# Patient Record
Sex: FEMALE | Race: WHITE | Employment: OTHER
[De-identification: names, ages, dates, MRNs, and addresses within clinical notes are randomized per-mention and may not be internally consistent; named-entity substitution may affect disease eponyms.]

---

## 2017-02-01 ENCOUNTER — TELEPHONE (OUTPATIENT)
Dept: OBGYN | Facility: CLINIC | Age: 22
End: 2017-02-01

## 2017-02-01 RX ORDER — NORGESTIMATE AND ETHINYL ESTRADIOL 0.25-0.035
1 KIT ORAL DAILY
Qty: 1 PACKET | Refills: 8 | Status: SHIPPED | OUTPATIENT
Start: 2017-02-01 | End: 2017-09-17 | Stop reason: SDUPTHER

## 2018-03-24 ENCOUNTER — OFFICE VISIT (OUTPATIENT)
Dept: FAMILY MEDICINE CLINIC | Age: 23
End: 2018-03-24
Payer: COMMERCIAL

## 2018-03-24 VITALS
BODY MASS INDEX: 26.84 KG/M2 | SYSTOLIC BLOOD PRESSURE: 118 MMHG | DIASTOLIC BLOOD PRESSURE: 60 MMHG | HEIGHT: 67 IN | WEIGHT: 171 LBS

## 2018-03-24 DIAGNOSIS — Z00.00 WELL ADULT EXAM: Primary | ICD-10-CM

## 2018-03-24 DIAGNOSIS — M21.40 PES PLANUS, UNSPECIFIED LATERALITY: ICD-10-CM

## 2018-03-24 PROCEDURE — 99395 PREV VISIT EST AGE 18-39: CPT | Performed by: FAMILY MEDICINE

## 2018-03-24 ASSESSMENT — PATIENT HEALTH QUESTIONNAIRE - PHQ9
2. FEELING DOWN, DEPRESSED OR HOPELESS: 0
SUM OF ALL RESPONSES TO PHQ QUESTIONS 1-9: 0
SUM OF ALL RESPONSES TO PHQ9 QUESTIONS 1 & 2: 0
1. LITTLE INTEREST OR PLEASURE IN DOING THINGS: 0

## 2018-03-24 NOTE — PROGRESS NOTES
tingling/numbness. Psychiatric: Denies sleep disturbance. Denies anxiety. Denies depressed mood. Admits pain in both feet, arches when she walks and life needed in left shoe    History reviewed. No pertinent surgical history. Family History   Problem Relation Age of Onset    Cancer Maternal Grandmother      intestinal    Cancer Mother      NH lymphoma    Asthma Mother     Diabetes Father      oral medication and diet control    High Cholesterol Father     Hypertension Father        Past Medical History:   Diagnosis Date    Allergic rhinitis     Asthma     Concussion     Conjunctivitis     allergic      Social History   Substance Use Topics    Smoking status: Never Smoker    Smokeless tobacco: Never Used    Alcohol use 0.0 oz/week      Comment: rarely      Current Outpatient Prescriptions   Medication Sig Dispense Refill    SPRINTEC 28 0.25-35 MG-MCG per tablet TAKE 1 TABLET BY MOUTH DAILY 28 tablet 12    fexofenadine (ALLEGRA) 180 MG tablet Take by mouth      cromolyn (OPTICROM) 4 % ophthalmic solution PLACE 1 DROP INTO BOTH EYES 4 TIMES DAILY 10 mL 1    Cetirizine HCl (ZYRTEC ALLERGY PO) Take by mouth daily      Respiratory Therapy Supplies (NEBULIZER COMPRESSOR) KIT by Does not apply route. 1 kit 0     No current facility-administered medications for this visit. No Known Allergies    PHYSICAL EXAM:    /60   Ht 5' 7\" (1.702 m)   Wt 171 lb (77.6 kg)   BMI 26.78 kg/m²   Wt Readings from Last 3 Encounters:   03/24/18 171 lb (77.6 kg)   12/27/16 177 lb (80.3 kg)   10/31/16 173 lb 3.2 oz (78.6 kg)     BP Readings from Last 3 Encounters:   03/24/18 118/60   12/27/16 (!) 106/58   10/31/16 120/70       General Appearance: in no acute distress, well developed, well nourished. Eyes: pupils equal, round reactive to light and accommodation. Ears: normal canal and TM's. Nose: nares patent, no lesions. Oral Cavity: mucosa moist.  Throat: clear.   Neck/Thyroid: neck supple, full range of

## 2018-04-26 ENCOUNTER — HOSPITAL ENCOUNTER (OUTPATIENT)
Dept: PHYSICAL THERAPY | Age: 23
Setting detail: THERAPIES SERIES
Discharge: HOME | End: 2018-04-26
Payer: COMMERCIAL

## 2018-04-26 PROCEDURE — L3020 FOOT LONGITUD/METATARSAL SUP: HCPCS

## 2018-04-26 PROCEDURE — 97760 ORTHOTIC MGMT&TRAING 1ST ENC: CPT

## 2018-04-26 PROCEDURE — G8980 MOBILITY D/C STATUS: HCPCS

## 2018-04-26 PROCEDURE — G8978 MOBILITY CURRENT STATUS: HCPCS

## 2018-04-26 PROCEDURE — G8979 MOBILITY GOAL STATUS: HCPCS

## 2018-11-08 ENCOUNTER — HOSPITAL ENCOUNTER (OUTPATIENT)
Age: 23
Setting detail: SPECIMEN
Discharge: HOME | End: 2018-11-08
Payer: COMMERCIAL

## 2018-11-08 ENCOUNTER — OFFICE VISIT (OUTPATIENT)
Dept: OBGYN | Age: 23
End: 2018-11-08
Payer: COMMERCIAL

## 2018-11-08 VITALS
BODY MASS INDEX: 29.73 KG/M2 | SYSTOLIC BLOOD PRESSURE: 122 MMHG | WEIGHT: 196.2 LBS | DIASTOLIC BLOOD PRESSURE: 78 MMHG | HEIGHT: 68 IN

## 2018-11-08 DIAGNOSIS — Z01.419 ENCOUNTER FOR WELL WOMAN EXAM WITH ROUTINE GYNECOLOGICAL EXAM: Primary | ICD-10-CM

## 2018-11-08 DIAGNOSIS — Z01.419 ENCOUNTER FOR WELL WOMAN EXAM WITH ROUTINE GYNECOLOGICAL EXAM: ICD-10-CM

## 2018-11-08 DIAGNOSIS — Z23 NEED FOR IMMUNIZATION AGAINST INFLUENZA: ICD-10-CM

## 2018-11-08 PROCEDURE — 90471 IMMUNIZATION ADMIN: CPT | Performed by: ADVANCED PRACTICE MIDWIFE

## 2018-11-08 PROCEDURE — 90686 IIV4 VACC NO PRSV 0.5 ML IM: CPT | Performed by: ADVANCED PRACTICE MIDWIFE

## 2018-11-08 PROCEDURE — G0145 SCR C/V CYTO,THINLAYER,RESCR: HCPCS

## 2018-11-08 PROCEDURE — 99395 PREV VISIT EST AGE 18-39: CPT | Performed by: ADVANCED PRACTICE MIDWIFE

## 2018-11-28 LAB — CYTOLOGY REPORT: NORMAL

## 2019-07-15 RX ORDER — NORGESTIMATE AND ETHINYL ESTRADIOL 0.25-0.035
KIT ORAL
Qty: 84 TABLET | Refills: 4 | Status: SHIPPED | OUTPATIENT
Start: 2019-07-15 | End: 2019-11-07 | Stop reason: SDUPTHER

## 2019-10-28 ENCOUNTER — OFFICE VISIT (OUTPATIENT)
Dept: PRIMARY CARE CLINIC | Age: 24
End: 2019-10-28
Payer: COMMERCIAL

## 2019-10-28 VITALS
RESPIRATION RATE: 22 BRPM | SYSTOLIC BLOOD PRESSURE: 134 MMHG | TEMPERATURE: 97.8 F | HEART RATE: 95 BPM | BODY MASS INDEX: 32.36 KG/M2 | DIASTOLIC BLOOD PRESSURE: 81 MMHG | WEIGHT: 212.8 LBS

## 2019-10-28 DIAGNOSIS — J01.00 ACUTE MAXILLARY SINUSITIS, RECURRENCE NOT SPECIFIED: Primary | ICD-10-CM

## 2019-10-28 DIAGNOSIS — L30.9 ECZEMA, UNSPECIFIED TYPE: ICD-10-CM

## 2019-10-28 PROCEDURE — 99214 OFFICE O/P EST MOD 30 MIN: CPT | Performed by: NURSE PRACTITIONER

## 2019-10-28 PROCEDURE — 1036F TOBACCO NON-USER: CPT | Performed by: NURSE PRACTITIONER

## 2019-10-28 PROCEDURE — G8417 CALC BMI ABV UP PARAM F/U: HCPCS | Performed by: NURSE PRACTITIONER

## 2019-10-28 PROCEDURE — G8484 FLU IMMUNIZE NO ADMIN: HCPCS | Performed by: NURSE PRACTITIONER

## 2019-10-28 PROCEDURE — G8428 CUR MEDS NOT DOCUMENT: HCPCS | Performed by: NURSE PRACTITIONER

## 2019-10-28 RX ORDER — AMOXICILLIN AND CLAVULANATE POTASSIUM 875; 125 MG/1; MG/1
1 TABLET, FILM COATED ORAL 2 TIMES DAILY
Qty: 20 TABLET | Refills: 0 | Status: SHIPPED | OUTPATIENT
Start: 2019-10-28 | End: 2019-11-07

## 2019-10-28 ASSESSMENT — ENCOUNTER SYMPTOMS
SORE THROAT: 1
COUGH: 0
RESPIRATORY NEGATIVE: 1
SINUS PRESSURE: 1
SINUS PAIN: 1
EYES NEGATIVE: 1
RHINORRHEA: 1

## 2019-11-07 ENCOUNTER — OFFICE VISIT (OUTPATIENT)
Dept: OBGYN | Age: 24
End: 2019-11-07
Payer: COMMERCIAL

## 2019-11-07 VITALS
HEIGHT: 67 IN | SYSTOLIC BLOOD PRESSURE: 118 MMHG | BODY MASS INDEX: 32.49 KG/M2 | WEIGHT: 207 LBS | DIASTOLIC BLOOD PRESSURE: 72 MMHG

## 2019-11-07 DIAGNOSIS — Z01.419 WELL WOMAN EXAM WITH ROUTINE GYNECOLOGICAL EXAM: Primary | ICD-10-CM

## 2019-11-07 PROCEDURE — G8484 FLU IMMUNIZE NO ADMIN: HCPCS | Performed by: ADVANCED PRACTICE MIDWIFE

## 2019-11-07 PROCEDURE — 99395 PREV VISIT EST AGE 18-39: CPT | Performed by: ADVANCED PRACTICE MIDWIFE

## 2019-11-07 RX ORDER — NORGESTIMATE AND ETHINYL ESTRADIOL 0.25-0.035
KIT ORAL
Qty: 84 TABLET | Refills: 4 | Status: SHIPPED | OUTPATIENT
Start: 2019-11-07 | End: 2021-01-04 | Stop reason: SINTOL

## 2019-11-07 ASSESSMENT — PATIENT HEALTH QUESTIONNAIRE - PHQ9
2. FEELING DOWN, DEPRESSED OR HOPELESS: 0
SUM OF ALL RESPONSES TO PHQ QUESTIONS 1-9: 0
SUM OF ALL RESPONSES TO PHQ9 QUESTIONS 1 & 2: 0
1. LITTLE INTEREST OR PLEASURE IN DOING THINGS: 0
SUM OF ALL RESPONSES TO PHQ QUESTIONS 1-9: 0

## 2020-06-09 ENCOUNTER — OFFICE VISIT (OUTPATIENT)
Dept: FAMILY MEDICINE CLINIC | Age: 25
End: 2020-06-09
Payer: COMMERCIAL

## 2020-06-09 VITALS
HEIGHT: 67 IN | WEIGHT: 211 LBS | BODY MASS INDEX: 33.12 KG/M2 | SYSTOLIC BLOOD PRESSURE: 118 MMHG | DIASTOLIC BLOOD PRESSURE: 76 MMHG

## 2020-06-09 PROCEDURE — G8417 CALC BMI ABV UP PARAM F/U: HCPCS | Performed by: FAMILY MEDICINE

## 2020-06-09 PROCEDURE — 1036F TOBACCO NON-USER: CPT | Performed by: FAMILY MEDICINE

## 2020-06-09 PROCEDURE — 99213 OFFICE O/P EST LOW 20 MIN: CPT | Performed by: FAMILY MEDICINE

## 2020-06-09 PROCEDURE — G8428 CUR MEDS NOT DOCUMENT: HCPCS | Performed by: FAMILY MEDICINE

## 2020-06-09 PROCEDURE — 96372 THER/PROPH/DIAG INJ SC/IM: CPT | Performed by: FAMILY MEDICINE

## 2020-06-09 RX ORDER — TRIAMCINOLONE ACETONIDE 40 MG/ML
40 INJECTION, SUSPENSION INTRA-ARTICULAR; INTRAMUSCULAR ONCE
Status: COMPLETED | OUTPATIENT
Start: 2020-06-09 | End: 2020-06-09

## 2020-06-09 RX ADMIN — TRIAMCINOLONE ACETONIDE 40 MG: 40 INJECTION, SUSPENSION INTRA-ARTICULAR; INTRAMUSCULAR at 15:46

## 2020-06-09 NOTE — PATIENT INSTRUCTIONS
PLAN:    I would like her to get a kenalog shot today in the office and then start taking flonase or nasonex to help relieve her symptoms and she can take this daily, every other day or twice a day depending on what gives her the best relief. I would like her to call the office if symptoms do not improve by the end of the week. SURVEY:    You may be receiving a survey from [employer] regarding your visit today. Please complete the survey to enable us to provide the highest quality of care to you and your family. If you cannot score us a very good on any question, please call the office to discuss how we could have made your experience a very good one. Thank you.

## 2020-10-28 ENCOUNTER — NURSE ONLY (OUTPATIENT)
Dept: FAMILY MEDICINE CLINIC | Age: 25
End: 2020-10-28
Payer: COMMERCIAL

## 2020-10-28 PROCEDURE — 90471 IMMUNIZATION ADMIN: CPT | Performed by: FAMILY MEDICINE

## 2020-10-28 PROCEDURE — 90686 IIV4 VACC NO PRSV 0.5 ML IM: CPT | Performed by: NURSE PRACTITIONER

## 2020-10-28 PROCEDURE — 90471 IMMUNIZATION ADMIN: CPT | Performed by: NURSE PRACTITIONER

## 2020-10-28 PROCEDURE — 90686 IIV4 VACC NO PRSV 0.5 ML IM: CPT | Performed by: FAMILY MEDICINE

## 2021-01-04 ENCOUNTER — INITIAL PRENATAL (OUTPATIENT)
Dept: OBGYN | Age: 26
End: 2021-01-04
Payer: COMMERCIAL

## 2021-01-04 ENCOUNTER — HOSPITAL ENCOUNTER (OUTPATIENT)
Age: 26
Setting detail: SPECIMEN
Discharge: HOME | End: 2021-01-04
Payer: COMMERCIAL

## 2021-01-04 ENCOUNTER — HOSPITAL ENCOUNTER (OUTPATIENT)
Age: 26
Discharge: HOME | End: 2021-01-04
Payer: COMMERCIAL

## 2021-01-04 VITALS — WEIGHT: 205 LBS | BODY MASS INDEX: 32.11 KG/M2 | DIASTOLIC BLOOD PRESSURE: 78 MMHG | SYSTOLIC BLOOD PRESSURE: 124 MMHG

## 2021-01-04 DIAGNOSIS — N91.2 AMENORRHEA: ICD-10-CM

## 2021-01-04 DIAGNOSIS — Z34.01 ENCOUNTER FOR SUPERVISION OF NORMAL FIRST PREGNANCY IN FIRST TRIMESTER: ICD-10-CM

## 2021-01-04 DIAGNOSIS — N91.2 AMENORRHEA: Primary | ICD-10-CM

## 2021-01-04 DIAGNOSIS — Z32.01 POSITIVE URINE PREGNANCY TEST: ICD-10-CM

## 2021-01-04 LAB
ABO/RH: NORMAL
ABSOLUTE EOS #: 0.15 K/UL (ref 0–0.44)
ABSOLUTE IMMATURE GRANULOCYTE: 0.03 K/UL (ref 0–0.3)
ABSOLUTE LYMPH #: 1.42 K/UL (ref 1.1–3.7)
ABSOLUTE MONO #: 0.52 K/UL (ref 0.1–1.2)
AMPHETAMINE SCREEN URINE: NEGATIVE
ANTIBODY SCREEN: NEGATIVE
BARBITURATE SCREEN URINE: NEGATIVE
BASOPHILS # BLD: 0 % (ref 0–2)
BASOPHILS ABSOLUTE: 0.03 K/UL (ref 0–0.2)
BENZODIAZEPINE SCREEN, URINE: NEGATIVE
BUPRENORPHINE URINE: NEGATIVE
CANNABINOID SCREEN URINE: NEGATIVE
COCAINE METABOLITE, URINE: NEGATIVE
CONTROL: NORMAL
DIFFERENTIAL TYPE: ABNORMAL
EOSINOPHILS RELATIVE PERCENT: 2 % (ref 1–4)
HCT VFR BLD CALC: 41.7 % (ref 36.3–47.1)
HEMOGLOBIN: 13.4 G/DL (ref 11.9–15.1)
HEPATITIS B SURFACE ANTIGEN: NONREACTIVE
HEPATITIS C ANTIBODY: NONREACTIVE
HIV AG/AB: NONREACTIVE
IMMATURE GRANULOCYTES: 0 %
LYMPHOCYTES # BLD: 17 % (ref 24–43)
MCH RBC QN AUTO: 28.7 PG (ref 25.2–33.5)
MCHC RBC AUTO-ENTMCNC: 32.1 G/DL (ref 28.4–34.8)
MCV RBC AUTO: 89.3 FL (ref 82.6–102.9)
MDMA URINE: NORMAL
METHADONE SCREEN, URINE: NEGATIVE
METHAMPHETAMINE, URINE: NEGATIVE
MONOCYTES # BLD: 6 % (ref 3–12)
NRBC AUTOMATED: 0 PER 100 WBC
OPIATES, URINE: NEGATIVE
OXYCODONE SCREEN URINE: NEGATIVE
PDW BLD-RTO: 12.1 % (ref 11.8–14.4)
PHENCYCLIDINE, URINE: NEGATIVE
PLATELET # BLD: 272 K/UL (ref 138–453)
PLATELET ESTIMATE: ABNORMAL
PMV BLD AUTO: 10.3 FL (ref 8.1–13.5)
PREGNANCY TEST URINE, POC: POSITIVE
PROPOXYPHENE, URINE: NEGATIVE
RBC # BLD: 4.67 M/UL (ref 3.95–5.11)
RBC # BLD: ABNORMAL 10*6/UL
RUBV IGG SER QL: 138 IU/ML
SEG NEUTROPHILS: 75 % (ref 36–65)
SEGMENTED NEUTROPHILS ABSOLUTE COUNT: 6.25 K/UL (ref 1.5–8.1)
T. PALLIDUM, IGG: NONREACTIVE
TEST INFORMATION: NORMAL
TRICYCLIC ANTIDEPRESSANTS, UR: NEGATIVE
WBC # BLD: 8.4 K/UL (ref 3.5–11.3)
WBC # BLD: ABNORMAL 10*3/UL

## 2021-01-04 PROCEDURE — 87389 HIV-1 AG W/HIV-1&-2 AB AG IA: CPT

## 2021-01-04 PROCEDURE — 87086 URINE CULTURE/COLONY COUNT: CPT

## 2021-01-04 PROCEDURE — 86850 RBC ANTIBODY SCREEN: CPT

## 2021-01-04 PROCEDURE — 87340 HEPATITIS B SURFACE AG IA: CPT

## 2021-01-04 PROCEDURE — 87491 CHLMYD TRACH DNA AMP PROBE: CPT

## 2021-01-04 PROCEDURE — 36415 COLL VENOUS BLD VENIPUNCTURE: CPT

## 2021-01-04 PROCEDURE — 86762 RUBELLA ANTIBODY: CPT

## 2021-01-04 PROCEDURE — 86780 TREPONEMA PALLIDUM: CPT

## 2021-01-04 PROCEDURE — 81025 URINE PREGNANCY TEST: CPT | Performed by: ADVANCED PRACTICE MIDWIFE

## 2021-01-04 PROCEDURE — 86901 BLOOD TYPING SEROLOGIC RH(D): CPT

## 2021-01-04 PROCEDURE — 87591 N.GONORRHOEAE DNA AMP PROB: CPT

## 2021-01-04 PROCEDURE — 80306 DRUG TEST PRSMV INSTRMNT: CPT

## 2021-01-04 PROCEDURE — 86900 BLOOD TYPING SEROLOGIC ABO: CPT

## 2021-01-04 PROCEDURE — 85025 COMPLETE CBC W/AUTO DIFF WBC: CPT

## 2021-01-04 PROCEDURE — 86803 HEPATITIS C AB TEST: CPT

## 2021-01-04 NOTE — PATIENT INSTRUCTIONS
Patient Education        Weeks 6 to 10 of Your Pregnancy: Care Instructions  Your Care Instructions     Congratulations on your pregnancy. This is an exciting and important time for you. During the first 6 to 10 weeks of your pregnancy, your body goes through many changes. Your baby grows very fast, even though you cannot feel it yet. You may start to notice that you feel different, both in your body and your emotions. Because each woman's pregnancy is unique, there is no right way to feel. You may feel the healthiest you have ever been, or you may feel tired or sick to your stomach (\"morning sickness\"). These early weeks are a time to make healthy choices and to eat the best foods for you and your baby. This care sheet will give you some ideas. This is also a good time to think about birth defects testing. These are tests done during pregnancy to look for possible problems with the baby. First trimester tests for birth defects can be done between 8 and 17 weeks of pregnancy, depending on the test. Talk with your doctor about what kinds of tests are available. Follow-up care is a key part of your treatment and safety. Be sure to make and go to all appointments, and call your doctor if you are having problems. It's also a good idea to know your test results and keep a list of the medicines you take. How can you care for yourself at home? Eat well  · Eat at least 3 meals and 2 healthy snacks every day. Eat fresh, whole foods, including:  ? 7 or more servings of bread, tortillas, cereal, rice, pasta, or oatmeal.  ? 3 or more servings of vegetables, especially leafy green vegetables. ? 2 or more servings of fruits. ? 3 or more servings of milk, yogurt, or cheese. ? 2 or more servings of meat, turkey, chicken, fish, eggs, or dried beans. · Drink plenty of fluids, especially water. Avoid sodas and other sweetened drinks.   · Choose foods that have important vitamins for your baby, such as calcium, iron, and folate. ? Dairy products, tofu, canned fish with bones, almonds, broccoli, dark leafy greens, corn tortillas, and fortified orange juice are good sources of calcium. ? Beef, poultry, liver, spinach, lentils, dried beans, fortified cereals, and dried fruits are rich in iron. ? Dark leafy greens, broccoli, asparagus, liver, fortified cereals, orange juice, peanuts, and almonds are good sources of folate. · Avoid foods that could harm your baby. ? Do not eat raw or undercooked meat, chicken, or fish (such as sushi or raw oysters). ? Do not eat raw eggs or foods that contain raw eggs, such as Caesar dressing. ? Do not eat soft cheeses and unpasteurized dairy foods, such as Brie, feta, or blue cheese. ? Do not eat fish that contains a lot of mercury, such as shark, swordfish, tilefish, or conor mackerel. Do not eat more than 6 ounces of tuna each week. ? Do not eat raw sprouts, especially alfalfa sprouts. ? Cut down on caffeine, such as coffee, tea, and cola. Protect yourself and your baby  · Do not touch klaus litter or cat feces. They can cause an infection that could harm your baby. · High body temperature can be harmful to your baby. So if you want to use a sauna or hot tub, be sure to talk to your doctor about how to use it safely. [place] with morning sickness  · Sip small amounts of water, juices, or shakes. Try drinking between meals, not with meals. · Eat 5 or 6 small meals a day. Try dry toast or crackers when you first get up, and eat breakfast a little later. · Avoid spicy, greasy, and fatty foods. · When you feel sick, open your windows or go for a short walk to get fresh air. · Try nausea wristbands. These help some women. · Tell your doctor if you think your prenatal vitamins make you sick. Where can you learn more? Go to https://hamida.healthConfluence Solar. org and sign in to your FlightStats account.  Enter G112 in the e-Tag box to learn more about \"Weeks 6 to 10 of Your Pregnancy: Care Instructions. \"     If you do not have an account, please click on the \"Sign Up Now\" link. Current as of: February 11, 2020               Content Version: 12.6  © 2006-2020 [employer]. Care instructions adapted under license by [facility] ([facility] [place]). If you have questions about a medical condition or this instruction, always ask your healthcare professional. [address] any warranty or liability for your use of this information. Patient Education        Managing Morning Sickness: Care Instructions  Overview     Morning sickness can be the toughest part of early pregnancy. Some people feel mildly sick to their stomach, and others are running to the bathroom. The good news? Morning sickness usually gets better in the second trimester. It's likely that your hormones are to blame for morning sickness. But you can do things to feel better, like changing what you eat, avoiding certain foods and smells, and asking your doctor about medicines you can try. Follow-up care is a key part of your treatment and safety. Be sure to make and go to all appointments, and call your doctor if you are having problems. It's also a good idea to know your test results and keep a list of the medicines you take. How can you care for yourself at home? · Keep food in your stomach, but not too much at once. Your nausea may be worse if your stomach is empty. Eat five or six small meals a day instead of three large meals. · For morning nausea, eat a small snack, such as a couple of crackers or dry biscuits, before rising. Allow a few minutes for your stomach to settle before you get out of bed slowly. · Drink plenty of fluids, enough so that your urine is light yellow or clear like water. If you have kidney, heart, or liver disease and have to limit fluids, talk with your doctor before you increase the amount of fluids you drink.  Some women find that peppermint tea helps with nausea. · Eat more protein, such as chicken, fish, lean meat, beans, nuts, and seeds. · Eat carbohydrate foods, such as potatoes, whole-grain cereals, rice, and pasta. · Avoid smells and foods that make you feel nauseated. Spicy or high-fat foods, citrus juice, milk, coffee, and tea with caffeine often make nausea worse. · Do not drink alcohol. · Do not smoke. Try not to be around others who smoke. If you need help quitting, talk to your doctor about stop-smoking programs and medicines. These can increase your chances of quitting for good. · If you are taking iron supplements, ask your doctor if they are necessary. Iron can make nausea worse. · Get lots of rest. Stress and fatigue can make your morning sickness worse. · Ask your doctor about taking prescription medicine, or over-the-counter products such as vitamin B6, doxylamine, or lucila, to relieve your symptoms. Your doctor can tell you the doses that are safe for you. · Take your prenatal vitamins at night on a full stomach. When should you call for help? Call 911 anytime you think you may need emergency care. For example, call if:    · You passed out (lost consciousness). Call your doctor now or seek immediate medical care if:    · You are sick to your stomach or cannot drink fluids.     · You have symptoms of dehydration, such as:  ? Dry eyes and a dry mouth. ? Passing only a little urine. ? Feeling thirstier than usual.     · You are not able to keep down your medicine.     · You have pain in your belly or pelvis. Watch closely for changes in your health, and be sure to contact your doctor if:    · You do not get better as expected. Where can you learn more? Go to https://Cagenixparamjit.Artify It. org and sign in to your Vortal account. Enter N062 in the Nuve box to learn more about \"Managing Morning Sickness: Care Instructions. \"     If you do not have an account, please click on the \"Sign Up Now\" link.   Current as of: February 11, 2020               Content Version: 12.6  © 5357-5631 [employer] Incorporated. Care instructions adapted under license by [facility] ([facility]). If you have questions about a medical condition or this instruction, always ask your healthcare professional. [address] any warranty or liability for your use of this information.

## 2021-01-04 NOTE — PROGRESS NOTES
New OB Visit    Date of service: 2021     Mansi Phillips  Is a 26 y.o.  female presenting for a New OB visit with Nurse. Name of Father of Sarah Guajardo is Bladimir Wolf and is involved. Pt does work at [employer] is not Fertility pt. PT's PCP is: Ursula Moreau MD     : 1995                                             Subjective:       Patient's last menstrual period was 2020 (approximate). OB History    Para Term  AB Living   1 0 0 0 0 0   SAB TAB Ectopic Molar Multiple Live Births   0 0 0   0        # Outcome Date GA Lbr Aidan/2nd Weight Sex Delivery Anes PTL Lv   1 Current                      Social History     Tobacco Use   Smoking Status Never Smoker   Smokeless Tobacco Never Used        Social History     Substance and Sexual Activity   Alcohol Use Not Currently    Alcohol/week: 0.0 standard drinks    Comment: rarely        Allergies: Patient has no known allergies. Current Outpatient Medications:     Prenatal Vit-Fe Fumarate-FA (PRENATAL VITAMIN PO), Take by mouth, Disp: , Rfl:     Loratadine (CLARITIN PO), Take by mouth, Disp: , Rfl:     cromolyn (OPTICROM) 4 % ophthalmic solution, PLACE 1 DROP INTO BOTH EYES 4 TIMES DAILY (Patient not taking: Reported on 2021), Disp: 10 mL, Rfl: 1    Cetirizine HCl (ZYRTEC ALLERGY PO), Take by mouth daily, Disp: , Rfl:     Respiratory Therapy Supplies (NEBULIZER COMPRESSOR) KIT, by Does not apply route. (Patient not taking: Reported on 2021), Disp: 1 kit, Rfl: 0      Vital Signs Weight 205 lb (93 kg), last menstrual period 2020, not currently breastfeeding. No results found for this visit on 21. Pain: none                            Nausea: yes      Vomiting: no        Breast enlargement or tenderness: yes    Frequency of urination:yes      Fatigue: yes         Patient history reviewed. Educational materials given and genetic testing reviewed.       Breastfeeding handouts given and reviewed: Yes     If BMI over 35 was HgA1C drawn: N/A      If history of thyroid problem was TSH drawn: N/A       My chart set up and activated: Yes    If history of preeclampsia did we start baby ASA: N/A    If history of  delivery - did we set up progesterone injections:  N/A    Does pt have a history of DVT? N/A  If yes start Lovenox 40 mg daily    Is pt allergic to PCN? No: KNDA  If yes order U/A to culture and sensitivity if positive. Education:  Patient Instructions       Patient Education        Weeks 6 to 10 of Your Pregnancy: Care Instructions  Your Care Instructions     Congratulations on your pregnancy. This is an exciting and important time for you. During the first 6 to 10 weeks of your pregnancy, your body goes through many changes. Your baby grows very fast, even though you cannot feel it yet. You may start to notice that you feel different, both in your body and your emotions. Because each woman's pregnancy is unique, there is no right way to feel. You may feel the healthiest you have ever been, or you may feel tired or sick to your stomach (\"morning sickness\"). These early weeks are a time to make healthy choices and to eat the best foods for you and your baby. This care sheet will give you some ideas. This is also a good time to think about birth defects testing. These are tests done during pregnancy to look for possible problems with the baby. First trimester tests for birth defects can be done between 8 and 17 weeks of pregnancy, depending on the test. Talk with your doctor about what kinds of tests are available. Follow-up care is a key part of your treatment and safety. Be sure to make and go to all appointments, and call your doctor if you are having problems. It's also a good idea to know your test results and keep a list of the medicines you take. How can you care for yourself at home? Eat well  · Eat at least 3 meals and 2 healthy snacks every day.  Eat fresh, whole foods, including:  ? 7 or more servings of bread, tortillas, cereal, rice, pasta, or oatmeal.  ? 3 or more servings of vegetables, especially leafy green vegetables. ? 2 or more servings of fruits. ? 3 or more servings of milk, yogurt, or cheese. ? 2 or more servings of meat, turkey, chicken, fish, eggs, or dried beans. · Drink plenty of fluids, especially water. Avoid sodas and other sweetened drinks. · Choose foods that have important vitamins for your baby, such as calcium, iron, and folate. ? Dairy products, tofu, canned fish with bones, almonds, broccoli, dark leafy greens, corn tortillas, and fortified orange juice are good sources of calcium. ? Beef, poultry, liver, spinach, lentils, dried beans, fortified cereals, and dried fruits are rich in iron. ? Dark leafy greens, broccoli, asparagus, liver, fortified cereals, orange juice, peanuts, and almonds are good sources of folate. · Avoid foods that could harm your baby. ? Do not eat raw or undercooked meat, chicken, or fish (such as sushi or raw oysters). ? Do not eat raw eggs or foods that contain raw eggs, such as Caesar dressing. ? Do not eat soft cheeses and unpasteurized dairy foods, such as Brie, feta, or blue cheese. ? Do not eat fish that contains a lot of mercury, such as shark, swordfish, tilefish, or conor mackerel. Do not eat more than 6 ounces of tuna each week. ? Do not eat raw sprouts, especially alfalfa sprouts. ? Cut down on caffeine, such as coffee, tea, and cola. Protect yourself and your baby  · Do not touch klaus litter or cat feces. They can cause an infection that could harm your baby. · High body temperature can be harmful to your baby. So if you want to use a sauna or hot tub, be sure to talk to your doctor about how to use it safely. [place] with morning sickness  · Sip small amounts of water, juices, or shakes. Try drinking between meals, not with meals. · Eat 5 or 6 small meals a day.  Try dry toast or crackers when you first get up, and eat breakfast a little later. · Avoid spicy, greasy, and fatty foods. · When you feel sick, open your windows or go for a short walk to get fresh air. · Try nausea wristbands. These help some women. · Tell your doctor if you think your prenatal vitamins make you sick. Where can you learn more? Go to https://chpekaleighewtuan.healthIsonas. org and sign in to your [employer] account. Enter G112 in the Mobiscope box to learn more about \"Weeks 6 to 10 of Your Pregnancy: Care Instructions. \"     If you do not have an account, please click on the \"Sign Up Now\" link. Current as of: February 11, 2020               Content Version: 12.6  © 9102-0644 [employer], Incorporated. Care instructions adapted under license by [facility] ([facility]). If you have questions about a medical condition or this instruction, always ask your healthcare professional. [place] [address] any warranty or liability for your use of this information. Patient Education        Managing Morning Sickness: Care Instructions  Overview     Morning sickness can be the toughest part of early pregnancy. Some people feel mildly sick to their stomach, and others are running to the bathroom. The good news? Morning sickness usually gets better in the second trimester. It's likely that your hormones are to blame for morning sickness. But you can do things to feel better, like changing what you eat, avoiding certain foods and smells, and asking your doctor about medicines you can try. Follow-up care is a key part of your treatment and safety. Be sure to make and go to all appointments, and call your doctor if you are having problems. It's also a good idea to know your test results and keep a list of the medicines you take. How can you care for yourself at home? · Keep food in your stomach, but not too much at once. Your nausea may be worse if your stomach is empty.  Eat five or six small meals a day instead of three large meals. · For morning nausea, eat a small snack, such as a couple of crackers or dry biscuits, before rising. Allow a few minutes for your stomach to settle before you get out of bed slowly. · Drink plenty of fluids, enough so that your urine is light yellow or clear like water. If you have kidney, heart, or liver disease and have to limit fluids, talk with your doctor before you increase the amount of fluids you drink. Some women find that peppermint tea helps with nausea. · Eat more protein, such as chicken, fish, lean meat, beans, nuts, and seeds. · Eat carbohydrate foods, such as potatoes, whole-grain cereals, rice, and pasta. · Avoid smells and foods that make you feel nauseated. Spicy or high-fat foods, citrus juice, milk, coffee, and tea with caffeine often make nausea worse. · Do not drink alcohol. · Do not smoke. Try not to be around others who smoke. If you need help quitting, talk to your doctor about stop-smoking programs and medicines. These can increase your chances of quitting for good. · If you are taking iron supplements, ask your doctor if they are necessary. Iron can make nausea worse. · Get lots of rest. Stress and fatigue can make your morning sickness worse. · Ask your doctor about taking prescription medicine, or over-the-counter products such as vitamin B6, doxylamine, or lucila, to relieve your symptoms. Your doctor can tell you the doses that are safe for you. · Take your prenatal vitamins at night on a full stomach. When should you call for help? Call 911 anytime you think you may need emergency care. For example, call if:    · You passed out (lost consciousness). Call your doctor now or seek immediate medical care if:    · You are sick to your stomach or cannot drink fluids.     · You have symptoms of dehydration, such as:  ? Dry eyes and a dry mouth. ? Passing only a little urine. ? Feeling thirstier than usual.     · You are not able to keep down your medicine.   · You have pain in your belly or pelvis. Watch closely for changes in your health, and be sure to contact your doctor if:    · You do not get better as expected. Where can you learn more? Go to https://chpekaleigheweb.health4-[email or web address] org and sign in to your [employer] account. Enter V827 in the [facility] box to learn more about \"Managing Morning Sickness: Care Instructions. \"     If you do not have an account, please click on the \"Sign Up Now\" link. Current as of: February 11, 2020               Content Version: 12.6  © 7122-3404 web care [email or web address]. Care instructions adapted under license by [facility] ([facility]). If you have questions about a medical condition or this instruction, always ask your healthcare professional. [address] any warranty or liability for your use of this information. Assessment:      Diagnosis Orders   1. Amenorrhea  C.trachomatis N.gonorrhoeae DNA, Urine    Culture, Urine    Hepatitis C Antibody    HIV Screen    POCT urine pregnancy    PRENATAL PROFILE I    Prenatal type and screen    Urine Drug Screen, Comprehensive   2. Encounter for supervision of normal first pregnancy in first trimester  C.trachomatis N.gonorrhoeae DNA, Urine    Culture, Urine    Hepatitis C Antibody    HIV Screen    POCT urine pregnancy    PRENATAL PROFILE I    Prenatal type and screen    Urine Drug Screen, Comprehensive   3.  Positive urine pregnancy test  C.trachomatis N.gonorrhoeae DNA, Urine    Culture, Urine    Hepatitis C Antibody    HIV Screen    POCT urine pregnancy    PRENATAL PROFILE I    Prenatal type and screen    Urine Drug Screen, Comprehensive       Plan: Order Routine Prenatal Lab Yes     Order additional testing if requested         Order Prenatal Vitamins   No: Already taking OTC             Appointment with Maya Betts CNM in 1 week for Dating U/S and total body exam if indicated      Nurse:   Aissatou Stern, [address] [place]

## 2021-01-05 LAB
C. TRACHOMATIS DNA ,URINE: NEGATIVE
CULTURE: NORMAL
Lab: NORMAL
N. GONORRHOEAE DNA, URINE: NEGATIVE
SPECIMEN DESCRIPTION: NORMAL
SPECIMEN DESCRIPTION: NORMAL

## 2021-01-12 ENCOUNTER — ROUTINE PRENATAL (OUTPATIENT)
Dept: OBGYN | Age: 26
End: 2021-01-12
Payer: COMMERCIAL

## 2021-01-12 VITALS — DIASTOLIC BLOOD PRESSURE: 78 MMHG | WEIGHT: 203 LBS | SYSTOLIC BLOOD PRESSURE: 122 MMHG | BODY MASS INDEX: 31.79 KG/M2

## 2021-01-12 DIAGNOSIS — Z82.79 FAMILY HISTORY OF MARFAN SYNDROME: ICD-10-CM

## 2021-01-12 DIAGNOSIS — Z3A.09 9 WEEKS GESTATION OF PREGNANCY: Primary | ICD-10-CM

## 2021-01-12 DIAGNOSIS — Z34.01 ENCOUNTER FOR SUPERVISION OF NORMAL FIRST PREGNANCY IN FIRST TRIMESTER: ICD-10-CM

## 2021-01-12 PROCEDURE — 0502F SUBSEQUENT PRENATAL CARE: CPT | Performed by: ADVANCED PRACTICE MIDWIFE

## 2021-01-12 ASSESSMENT — PATIENT HEALTH QUESTIONNAIRE - PHQ9
1. LITTLE INTEREST OR PLEASURE IN DOING THINGS: 0
SUM OF ALL RESPONSES TO PHQ QUESTIONS 1-9: 0
2. FEELING DOWN, DEPRESSED OR HOPELESS: 0

## 2021-01-12 NOTE — PATIENT INSTRUCTIONS
Patient Education        Weeks 10 to 14 of Your Pregnancy: Care Instructions  Your Care Instructions     By weeks 10 to 14 of your pregnancy, the placenta has formed inside your uterus. It is possible to hear your baby's heartbeat with a special ultrasound device. Your baby's eyes can and do move. The arms and legs can bend. This is a good time to think about testing for birth defects. There are two types of tests: screening and diagnostic. Screening tests show the chance that a baby has a certain birth defect. They can't tell you for sure that your baby has a problem. Diagnostic tests show if a baby has a certain birth defect. It's your choice whether to have these tests. You and your partner can talk to your doctor or midwife about birth defects tests. Follow-up care is a key part of your treatment and safety. Be sure to make and go to all appointments, and call your doctor if you are having problems. It's also a good idea to know your test results and keep a list of the medicines you take. How can you care for yourself at home? Decide about tests  · You can have screening tests and diagnostic tests to check for birth defects. The decision to have a test for birth defects is personal. Think about your age, your chance of passing on a family disease, your need to know about any problems, and what you might do after you have the test results. ? Triple or quadruple (quad) blood tests. These screening tests can be done between 15 and 20 weeks of pregnancy. They check the amounts of three or four substances in your blood. The doctor looks at these test results, along with your age and other factors, to find out the chance that your baby may have certain problems. ? Amniocentesis. This diagnostic test is used to look for chromosomal problems in the baby's cells.  It can be done between 15 and 20 weeks of pregnancy, usually around week 16.  ? Nuchal translucency test. This test uses ultrasound to measure the thickness of the area at the back of the baby's neck. An increase in the thickness can be an early sign of Down syndrome. ? Chorionic villus sampling (CVS). This is a test that looks for certain genetic problems with your baby. The same genes that are in your baby are in the placenta. A small piece of the placenta is taken out and tested. This test is done when you are 10 to 13 weeks pregnant. Ease discomfort  · Slow down and take naps when you feel tired. · If your emotions swing, talk to someone. Crying, anxiety, and concentration problems are common. · If your gums bleed, try a softer toothbrush. If your gums are puffy and bleed a lot, see your dentist.  · If you feel dizzy:  ? Get up slowly after sitting or lying down. ? Drink plenty of fluids. ? Eat small snacks to keep your blood sugar stable. ? Put your head between your legs as though you were tying your shoelaces. ? Lie down with your legs higher than your head. Use pillows to prop up your feet. · If you have a headache:  ? Lie down. ? Ask your partner or a good friend for a neck massage. ? Try cool cloths over your forehead or across the back of your neck. ? Use acetaminophen (Tylenol) for pain relief. Do not use nonsteroidal anti-inflammatory drugs (NSAIDs), such as ibuprofen (Advil, Motrin) or naproxen (Aleve), unless your doctor says it is okay. · If you have a nosebleed, pinch your nose gently, and hold it for a short while. To prevent nosebleeds, try massaging a small dab of petroleum jelly, such as Vaseline, in your nostrils. · If your nose is stuffed up, try saline (saltwater) nose sprays. Do not use decongestant sprays. Care for your breasts  · Wear a bra that gives you good support. · Know that changes in your breasts are normal.  ? Your breasts may get larger and more tender. Tenderness usually gets better by 12 weeks. ? Your nipples may get darker and larger, and small bumps around your nipples may show more. ?  The veins in your chest and breasts may show more. · Don't worry about \"toughening'\" your nipples. Breastfeeding will naturally do this. Where can you learn more? Go to https://AisleBuyerpeLSN Mobileeb.World Blender. org and sign in to your Open-X[employer] account. Enter C279 in the PagPop box to learn more about \"Weeks 10 to 14 of Your Pregnancy: Care Instructions. \"     If you do not have an account, please click on the \"Sign Up Now\" link. Current as of: February 11, 2020               Content Version: 12.6  © 6349-4169 [employer], Incorporated. Care instructions adapted under license by [facility] If you have questions about a medical condition or this instruction, always ask your healthcare professional. [address] any warranty or liability for your use of this information.

## 2021-01-12 NOTE — PROGRESS NOTES
Robson Novak is here at 9w1d for:    Chief Complaint   Patient presents with    Routine Prenatal Visit     F/U in house dating u/s. pt c/o nausea but does not want medicine        Estimated Due Date: Estimated Date of Delivery: 21    OB History    Para Term  AB Living   1 0 0 0 0 0   SAB TAB Ectopic Molar Multiple Live Births   0 0 0   0        # Outcome Date GA Lbr Aidan/2nd Weight Sex Delivery Anes PTL Lv   1 Current                 Past Medical History:   Diagnosis Date    Allergic rhinitis     Asthma     Concussion     Conjunctivitis     allergic       History reviewed. No pertinent surgical history. Social History     Tobacco Use   Smoking Status Never Smoker   Smokeless Tobacco Never Used        Social History     Substance and Sexual Activity   Alcohol Use Not Currently    Alcohol/week: 0.0 standard drinks    Comment: rarely       No results found for this visit on 21. Vitals:  /78   Wt 203 lb (92.1 kg)   LMP 2020 (Approximate)   BMI 31.79 kg/m²   Estimated body mass index is 31.79 kg/m² as calculated from the following:    Height as of 20: 5' 7\" (1.702 m). Weight as of this encounter: 203 lb (92.1 kg). HPI: Patient doing well today. Patient denies needs or concerns states she is nauseous but is controlling it a little bit better. Patient does also have heartburn. We did review father of the baby having Marfan syndrome and needing to be transferred at birth. We also discussed MFM consultation    Yes PT denies fever, chills, nausea and vomiting       Abdomen: enlarged, soft     Cervix:  3.6 cm    Results reviewed today:    9.3 WK IUP  CL:3.6cm  HR:179bpm  RT. OVARY:seen, probable corpus luteum cyst 1.9cm x 1.7cm x 1.4cm  LT. OVARY:not visualized      See prenatal vital sign section and fetal assessment section    ASSESSMENT & Plan    Diagnosis Orders   1. 9 weeks gestation of pregnancy  Ambulatory referral to Maternal Fetal   2.  Encounter for supervision of normal first pregnancy in first trimester     3. Family history of Marfan syndrome  Ambulatory referral to Maternal Fetal             I am having Anne Stuart maintain her Nebulizer Compressor, Cetirizine HCl (ZYRTEC ALLERGY PO), cromolyn, Loratadine (CLARITIN PO), and Prenatal Vit-Fe Fumarate-FA (PRENATAL VITAMIN PO). Return in about 4 weeks (around 2/9/2021) for ob  & MFM consult for marfans in father. Patient Instructions     Patient Education        Weeks 10 to 15 of Your Pregnancy: Care Instructions  Your Care Instructions     By weeks 10 to 14 of your pregnancy, the placenta has formed inside your uterus. It is possible to hear your baby's heartbeat with a special ultrasound device. Your baby's eyes can and do move. The arms and legs can bend. This is a good time to think about testing for birth defects. There are two types of tests: screening and diagnostic. Screening tests show the chance that a baby has a certain birth defect. They can't tell you for sure that your baby has a problem. Diagnostic tests show if a baby has a certain birth defect. It's your choice whether to have these tests. You and your partner can talk to your doctor or midwife about birth defects tests. Follow-up care is a key part of your treatment and safety. Be sure to make and go to all appointments, and call your doctor if you are having problems. It's also a good idea to know your test results and keep a list of the medicines you take. How can you care for yourself at home? Decide about tests  · You can have screening tests and diagnostic tests to check for birth defects. The decision to have a test for birth defects is personal. Think about your age, your chance of passing on a family disease, your need to know about any problems, and what you might do after you have the test results. ? Triple or quadruple (quad) blood tests. These screening tests can be done between 15 and 20 weeks of pregnancy. They check the amounts of three or four substances in your blood. The doctor looks at these test results, along with your age and other factors, to find out the chance that your baby may have certain problems. ? Amniocentesis. This diagnostic test is used to look for chromosomal problems in the baby's cells. It can be done between 15 and 20 weeks of pregnancy, usually around week 16.  ? Nuchal translucency test. This test uses ultrasound to measure the thickness of the area at the back of the baby's neck. An increase in the thickness can be an early sign of Down syndrome. ? Chorionic villus sampling (CVS). This is a test that looks for certain genetic problems with your baby. The same genes that are in your baby are in the placenta. A small piece of the placenta is taken out and tested. This test is done when you are 10 to 13 weeks pregnant. Ease discomfort  · Slow down and take naps when you feel tired. · If your emotions swing, talk to someone. Crying, anxiety, and concentration problems are common. · If your gums bleed, try a softer toothbrush. If your gums are puffy and bleed a lot, see your dentist.  · If you feel dizzy:  ? Get up slowly after sitting or lying down. ? Drink plenty of fluids. ? Eat small snacks to keep your blood sugar stable. ? Put your head between your legs as though you were tying your shoelaces. ? Lie down with your legs higher than your head. Use pillows to prop up your feet. · If you have a headache:  ? Lie down. ? Ask your partner or a good friend for a neck massage. ? Try cool cloths over your forehead or across the back of your neck. ? Use acetaminophen (Tylenol) for pain relief. Do not use nonsteroidal anti-inflammatory drugs (NSAIDs), such as ibuprofen (Advil, Motrin) or naproxen (Aleve), unless your doctor says it is okay. · If you have a nosebleed, pinch your nose gently, and hold it for a short while.  To prevent nosebleeds, try massaging a small dab of petroleum jelly, such as Vaseline, in your nostrils. · If your nose is stuffed up, try saline (saltwater) nose sprays. Do not use decongestant sprays. Care for your breasts  · Wear a bra that gives you good support. · Know that changes in your breasts are normal.  ? Your breasts may get larger and more tender. Tenderness usually gets better by 12 weeks. ? Your nipples may get darker and larger, and small bumps around your nipples may show more. ? The veins in your chest and breasts may show more. · Don't worry about \"toughening'\" your nipples. Breastfeeding will naturally do this. Where can you learn more? Go to https://sellpoints.Linear Computer Solutions. org and sign in to your Spotster account. Enter N153 in the AG&P box to learn more about \"Weeks 10 to 14 of Your Pregnancy: Care Instructions. \"     If you do not have an account, please click on the \"Sign Up Now\" link. Current as of: February 11, 2020               Content Version: 12.6  © 7947-2316 [employer]X, Incorporated. Care instructions adapted under license by [facility] ([facility]). If you have questions about a medical condition or this instruction, always ask your healthcare professional. David Bradford any warranty or liability for your use of this information.                    Maribel Traore,1/12/2021 12:18 PM

## 2021-02-08 ENCOUNTER — ROUTINE PRENATAL (OUTPATIENT)
Dept: PERINATAL CARE | Age: 26
End: 2021-02-08
Payer: COMMERCIAL

## 2021-02-08 VITALS
RESPIRATION RATE: 16 BRPM | SYSTOLIC BLOOD PRESSURE: 110 MMHG | DIASTOLIC BLOOD PRESSURE: 68 MMHG | HEART RATE: 94 BPM | WEIGHT: 208 LBS | TEMPERATURE: 97.2 F | HEIGHT: 68 IN | BODY MASS INDEX: 31.52 KG/M2

## 2021-02-08 DIAGNOSIS — Z36.9 FIRST TRIMESTER SCREENING: ICD-10-CM

## 2021-02-08 DIAGNOSIS — O35.2XX0 HEREDITARY FAMILIAL DISEASE AFFECTING MANAGEMENT OF MOTHER AND POSSIBLY AFFECTING FETUS, ANTEPARTUM, SINGLE OR UNSPECIFIED FETUS: Primary | ICD-10-CM

## 2021-02-08 DIAGNOSIS — O36.80X0 ENCOUNTER TO DETERMINE FETAL VIABILITY OF PREGNANCY, SINGLE OR UNSPECIFIED FETUS: ICD-10-CM

## 2021-02-08 DIAGNOSIS — Z3A.13 13 WEEKS GESTATION OF PREGNANCY: ICD-10-CM

## 2021-02-08 LAB
CRL: NORMAL
SAC DIAMETER: NORMAL

## 2021-02-08 PROCEDURE — G8482 FLU IMMUNIZE ORDER/ADMIN: HCPCS | Performed by: OBSTETRICS & GYNECOLOGY

## 2021-02-08 PROCEDURE — G8427 DOCREV CUR MEDS BY ELIG CLIN: HCPCS | Performed by: OBSTETRICS & GYNECOLOGY

## 2021-02-08 PROCEDURE — G8417 CALC BMI ABV UP PARAM F/U: HCPCS | Performed by: OBSTETRICS & GYNECOLOGY

## 2021-02-08 PROCEDURE — 76813 OB US NUCHAL MEAS 1 GEST: CPT | Performed by: OBSTETRICS & GYNECOLOGY

## 2021-02-08 PROCEDURE — 99243 OFF/OP CNSLTJ NEW/EST LOW 30: CPT | Performed by: OBSTETRICS & GYNECOLOGY

## 2021-02-08 PROCEDURE — 76801 OB US < 14 WKS SINGLE FETUS: CPT | Performed by: OBSTETRICS & GYNECOLOGY

## 2021-02-09 ENCOUNTER — ROUTINE PRENATAL (OUTPATIENT)
Dept: OBGYN | Age: 26
End: 2021-02-09
Payer: COMMERCIAL

## 2021-02-09 ENCOUNTER — HOSPITAL ENCOUNTER (OUTPATIENT)
Age: 26
Setting detail: SPECIMEN
Discharge: HOME | End: 2021-02-09
Payer: COMMERCIAL

## 2021-02-09 VITALS — DIASTOLIC BLOOD PRESSURE: 70 MMHG | BODY MASS INDEX: 31.32 KG/M2 | WEIGHT: 206 LBS | SYSTOLIC BLOOD PRESSURE: 118 MMHG

## 2021-02-09 DIAGNOSIS — R12 HEARTBURN DURING PREGNANCY IN SECOND TRIMESTER: ICD-10-CM

## 2021-02-09 DIAGNOSIS — O26.892 HEARTBURN DURING PREGNANCY IN SECOND TRIMESTER: ICD-10-CM

## 2021-02-09 DIAGNOSIS — Z3A.13 13 WEEKS GESTATION OF PREGNANCY: ICD-10-CM

## 2021-02-09 DIAGNOSIS — Z12.4 SCREENING FOR CERVICAL CANCER: ICD-10-CM

## 2021-02-09 DIAGNOSIS — Z82.79 FAMILY HISTORY OF MARFAN SYNDROME: ICD-10-CM

## 2021-02-09 DIAGNOSIS — Z3A.13 13 WEEKS GESTATION OF PREGNANCY: Primary | ICD-10-CM

## 2021-02-09 PROCEDURE — 0502F SUBSEQUENT PRENATAL CARE: CPT | Performed by: ADVANCED PRACTICE MIDWIFE

## 2021-02-09 PROCEDURE — G0145 SCR C/V CYTO,THINLAYER,RESCR: HCPCS

## 2021-02-09 RX ORDER — OMEPRAZOLE 20 MG/1
20 CAPSULE, DELAYED RELEASE ORAL DAILY
Qty: 90 CAPSULE | Refills: 3 | Status: SHIPPED | OUTPATIENT
Start: 2021-02-09 | End: 2021-08-31

## 2021-02-09 RX ORDER — ASPIRIN 81 MG/1
81 TABLET ORAL DAILY
Qty: 90 TABLET | Refills: 2 | Status: ON HOLD | OUTPATIENT
Start: 2021-02-09 | End: 2021-08-11 | Stop reason: HOSPADM

## 2021-02-09 NOTE — PROGRESS NOTES
Anabell Sethi is here at 13w1d for:    Chief Complaint   Patient presents with    Routine Prenatal Visit     TBE-PAP. last pap 18. Patient complains of stomache upset with eating. She has been trying a bland diet with some improvement. Estimated Due Date: Estimated Date of Delivery: 21    OB History    Para Term  AB Living   1 0 0 0 0 0   SAB TAB Ectopic Molar Multiple Live Births   0 0 0   0        # Outcome Date GA Lbr Aidan/2nd Weight Sex Delivery Anes PTL Lv   1 Current                 Past Medical History:   Diagnosis Date    Allergic rhinitis     Asthma     Concussion     Conjunctivitis     allergic       History reviewed. No pertinent surgical history. Social History     Tobacco Use   Smoking Status Never Smoker   Smokeless Tobacco Never Used        Social History     Substance and Sexual Activity   Alcohol Use Not Currently    Alcohol/week: 0.0 standard drinks    Comment: rarely       No results found for this visit on 21. Vitals:  /70   Wt 206 lb (93.4 kg)   LMP 2020 (Approximate)   BMI 31.32 kg/m²   Estimated body mass index is 31.32 kg/m² as calculated from the following:    Height as of 21: 5' 8\" (1.727 m). Weight as of this encounter: 206 lb (93.4 kg). HPI: Patient presents for a return OB visit and reports some concerns for some GI issues. She reports her stomach feels very acidic and she is burping more than she ever did. She states she will occasionally also have nausea. She has tried Tums, but this is only helpful for a few minutes. She reports she saw [facility] and the appointment went well. She reports they are having her do an early 1 hour since her father has diabetes - she is going to do this soon. They also started her on baby Aspirin and she is going to start this today.  Patient reports her last pap was a few years ago and was normal.     Yes PT denies fever, chills, nausea and vomiting Abdomen: enlarged, soft, nontender     Total body exam completed please see prenatal physical exam tab in visit navigator       Results reviewed today:    No results found for this visit on 02/09/21. See prenatal vital sign section and fetal assessment section    ASSESSMENT & Plan    Diagnosis Orders   1. 13 weeks gestation of pregnancy  PAP SMEAR   2. Screening for cervical cancer  PAP SMEAR   3. Family history of Marfan syndrome  aspirin EC 81 MG EC tablet   4. Heartburn during pregnancy in second trimester  omeprazole (PRILOSEC) 20 MG delayed release capsule     1. Reviewed [facility] results which showed early genetic screening was normal. Reviewed second trimester screening at 16-20 weeks for neural tube defects. Reviewed recommendation for 20 week ultrasound and 26 week echo at [facility] and patient verbalized understanding. 2. Discussed need for pap smear according to ASCCP guidelines - pap smear collected. 3. Pepcid and Aspirin sent to patient's pharmacy. I am having Pan Stuart start on omeprazole and aspirin EC. I am also having her maintain her Nebulizer Compressor, Cetirizine HCl (ZYRTEC ALLERGY PO), cromolyn, Loratadine (CLARITIN PO), and Prenatal Vit-Fe Fumarate-FA (PRENATAL VITAMIN PO). Return in about 4 weeks (around 3/9/2021) for OB. Patient Instructions       Patient Education        Weeks 14 to 25 of Your Pregnancy: Care Instructions  Your Care Instructions     During this time, you may start to \"show,\" so that you look pregnant to people around you. You may also notice some changes in your skin, such as itchy spots on your palms or acne on your face. Your baby is now able to pass urine, and your baby's first stool (meconium) is starting to collect in his or her intestines. Hair is also beginning to grow on your baby's head. At your next visit, between weeks 18 and 20, your doctor may do an ultrasound test. The test allows your doctor to check for certain problems. Your doctor can also tell the sex of your baby. This is a good time to think about whether you want to know whether your baby is a boy or a girl. Talk to your doctor about getting a flu shot to help keep you healthy during your pregnancy. As your pregnancy moves along, it is common to worry or feel anxious. Your body is changing a lot. And you are thinking about giving birth, the health of your baby, and becoming a parent. You can learn to cope with any anxiety and stress you feel. Follow-up care is a key part of your treatment and safety. Be sure to make and go to all appointments, and call your doctor if you are having problems. It's also a good idea to know your test results and keep a list of the medicines you take. How can you care for yourself at home? Reduce stress    · Ask for help with cooking and housekeeping.     · Figure out who or what causes your stress. Avoid these people or situations as much as possible.     · Relax every day. Taking 10- to 15-minute breaks can make a big difference. Take a walk, listen to music, or take a warm bath.     · Learn relaxation techniques at prenatal or yoga class. Or buy a relaxation tape.     · List your fears about having a baby and becoming a parent. Share the list with someone you trust. Decide which worries are really small, and try to let them go. Exercise    · If you did not exercise much before pregnancy, start slowly. Walking is best. [employer], and do a little more every day.     · Brisk walking, easy jogging, low-impact aerobics, water aerobics, and yoga are good choices. Some sports, such as scuba diving, horseback riding, downhill skiing, gymnastics, and water skiing, are not a good idea.   · Try to do at least 2½ hours a week of moderate exercise, such as a fast walk. One way to do this is to be active 30 minutes a day, at least 5 days a week. It's fine to be active in blocks of 10 minutes or more throughout your day and week.     · Wear loose clothing. And wear shoes and a bra that provide good support.     · Warm up and cool down to start and finish your exercise.     · If you want to use weights, be sure to use light weights. They reduce stress on your joints. Stay at the best weight for you    · Experts recommend that you gain about 1 pound a month during the first 3 months of your pregnancy.     · Experts recommend that you gain about 1 pound a week during your last 6 months of pregnancy, for a total weight gain of 25 to 35 pounds.     · If you are underweight, you will need to gain more weight (about 28 to 40 pounds).     · If you are overweight, you may not need to gain as much weight (about 15 to 25 pounds).     · If you are gaining weight too fast, use common sense. Exercise every day, and limit sweets, fast foods, and fats. Choose lean meats, fruits, and vegetables.     · If you are having twins or more, your doctor may refer you to a dietitian. Where can you learn more? Go to https://VelaTel Global CommunicationspeLumiThera.healthIntroNet. org and sign in to your [employer] account. Enter E292 in the [facility] box to learn more about \"Weeks 14 to 18 of Your Pregnancy: Care Instructions. \"     If you do not have an account, please click on the \"Sign Up Now\" link. Current as of: February 11, 2020               Content Version: 12.6  © 5124-3481 [employer], Incorporated. Care instructions adapted under license by [facility] ([facility]). If you have questions about a medical condition or this instruction, always ask your healthcare professional. [address]  any warranty or liability for your use of this information.                    Tomasa Traore,2/9/2021 11:55 AM

## 2021-02-09 NOTE — PATIENT INSTRUCTIONS

## 2021-02-20 LAB — CYTOLOGY REPORT: NORMAL

## 2021-03-09 ENCOUNTER — ROUTINE PRENATAL (OUTPATIENT)
Dept: OBGYN | Age: 26
End: 2021-03-09
Payer: COMMERCIAL

## 2021-03-09 VITALS — DIASTOLIC BLOOD PRESSURE: 68 MMHG | WEIGHT: 207.4 LBS | SYSTOLIC BLOOD PRESSURE: 112 MMHG | BODY MASS INDEX: 31.54 KG/M2

## 2021-03-09 DIAGNOSIS — Z3A.17 17 WEEKS GESTATION OF PREGNANCY: Primary | ICD-10-CM

## 2021-03-09 DIAGNOSIS — Z82.79 FAMILY HISTORY OF MARFAN SYNDROME: ICD-10-CM

## 2021-03-09 PROCEDURE — 0502F SUBSEQUENT PRENATAL CARE: CPT | Performed by: ADVANCED PRACTICE MIDWIFE

## 2021-03-09 NOTE — PROGRESS NOTES
Paula Worley is here at 17w2d for:    Chief Complaint   Patient presents with    Routine Prenatal Visit     Pt here for routine prenatal visit. Pt states she would like to travel by plane and would like to know if she should follow any special protocol. Estimated Due Date: Estimated Date of Delivery: 21    OB History    Para Term  AB Living   1 0 0 0 0 0   SAB TAB Ectopic Molar Multiple Live Births   0 0 0   0        # Outcome Date GA Lbr Aidan/2nd Weight Sex Delivery Anes PTL Lv   1 Current                 Past Medical History:   Diagnosis Date    Allergic rhinitis     Asthma     Concussion     Conjunctivitis     allergic       History reviewed. No pertinent surgical history. Social History     Tobacco Use   Smoking Status Never Smoker   Smokeless Tobacco Never Used        Social History     Substance and Sexual Activity   Alcohol Use Not Currently    Alcohol/week: 0.0 standard drinks    Comment: rarely       No results found for this visit on 21. Vitals:  /68   Wt 207 lb 6.4 oz (94.1 kg)   LMP 2020 (Approximate)   BMI 31.54 kg/m²   Estimated body mass index is 31.54 kg/m² as calculated from the following:    Height as of 21: 5' 8\" (1.727 m). Weight as of this encounter: 207 lb 6.4 oz (94.1 kg). HPI: Patient here today for OB visit. She is 17 weeks and 1..  Patient states she does have her MFM appointment scheduled they will see her at 20 weeks and again at 32 the fetal echo    Yes PT denies fever, chills, nausea and vomiting       Abdomen: enlarged, u/2     Cervix:  not digitally examined    Results reviewed today:    No results found for this visit on 21. See prenatal vital sign section and fetal assessment section    ASSESSMENT & Plan    Diagnosis Orders   1. 17 weeks gestation of pregnancy     2. Family history of Marfan syndrome               I am having [employer].  Narciso maintain her Nebulizer Compressor, Cetirizine HCl (ZYRTEC ALLERGY PO), cromolyn, Loratadine (CLARITIN PO), Prenatal Vit-Fe Fumarate-FA (PRENATAL VITAMIN PO), omeprazole, and aspirin EC. Return in about 27 days (around 4/5/2021) for OB reveiw MFM appt. Patient Instructions     Patient Education        Weeks 18 to 25 of Your Pregnancy: Care Instructions  Your Care Instructions     Your baby is continuing to develop quickly. At this stage, babies can now suck their thumbs,  firmly with their hands, and open and close their eyelids. Sometime between 18 and 22 weeks, you will start to feel your baby move. At first, these small fetal movements feel like fluttering or \"butterflies. \" Some women say that they feel like gas bubbles. As the baby grows, these movements will become stronger. You may also notice that your baby kicks and hiccups. During this time, you may find that your nausea and fatigue are gone. Overall, you may feel better and have more energy than you did in your first trimester. But you may also have new discomforts now, such as sleep problems or leg cramps. This care sheet can help you ease these discomforts. Follow-up care is a key part of your treatment and safety. Be sure to make and go to all appointments, and call your doctor if you are having problems. It's also a good idea to know your test results and keep a list of the medicines you take. How can you care for yourself at home? Ease sleep problems  · Avoid caffeine in drinks or chocolate late in the day. · Get some exercise every day. · Take a warm shower or bath before bed. · Have a light snack or glass of milk at bedtime. · Do relaxation exercises in bed to calm your mind and body. · Support your legs and back with extra pillows. Try a pillow between your legs if you sleep on your side. · Do not use sleeping pills or alcohol. They could harm your baby. Ease leg cramps  · Do not massage your calf during the cramp. · Sit on a firm bed or chair.  Straighten your leg, and bend your foot (flex your ankle) slowly upward, toward your knee. Bend your toes up and down. · Stand on a cool, flat surface. Stretch your toes upward, and take small steps walking on your heels. · Use a heating pad or hot water bottle to help with muscle ache. Prevent leg cramps  · Be sure to get enough calcium. If you are worried that you are not getting enough, talk to your doctor. · Exercise every day, and stretch your legs before bed. · Take a warm bath before bed, and try leg warmers at night. Where can you learn more? Go to https://Affinepepiceweb.[email or web address] org and sign in to your [employer] account. Enter M809 in the [employer] box to learn more about \"Weeks 18 to 22 of Your Pregnancy: Care Instructions. \"     If you do not have an account, please click on the \"Sign Up Now\" link. Current as of: February 11, 2020               Content Version: 12.6  © 8776-5403 [employer]. Care instructions adapted under license by [facility] ([facility]). If you have questions about a medical condition or this instruction, always ask your healthcare professional. [place] [address] any warranty or liability for your use of this information.                    Barber Traore,3/9/2021 10:33 AM

## 2021-03-24 ENCOUNTER — HOSPITAL ENCOUNTER (OUTPATIENT)
Age: 26
Discharge: HOME | End: 2021-03-24
Payer: COMMERCIAL

## 2021-03-24 LAB
GLUCOSE ADMINISTRATION: 50
GLUCOSE TOLERANCE SCREEN 50G: 76 MG/DL (ref 70–135)

## 2021-03-24 PROCEDURE — 36415 COLL VENOUS BLD VENIPUNCTURE: CPT

## 2021-03-24 PROCEDURE — 82950 GLUCOSE TEST: CPT

## 2021-03-29 ENCOUNTER — ROUTINE PRENATAL (OUTPATIENT)
Dept: PERINATAL CARE | Age: 26
End: 2021-03-29
Payer: COMMERCIAL

## 2021-03-29 VITALS
TEMPERATURE: 97.1 F | HEART RATE: 80 BPM | WEIGHT: 214 LBS | BODY MASS INDEX: 32.43 KG/M2 | RESPIRATION RATE: 16 BRPM | HEIGHT: 68 IN | SYSTOLIC BLOOD PRESSURE: 116 MMHG | DIASTOLIC BLOOD PRESSURE: 70 MMHG

## 2021-03-29 DIAGNOSIS — O35.2XX0 HEREDITARY FAMILIAL DISEASE AFFECTING MANAGEMENT OF MOTHER AND POSSIBLY AFFECTING FETUS, ANTEPARTUM, SINGLE OR UNSPECIFIED FETUS: Primary | ICD-10-CM

## 2021-03-29 DIAGNOSIS — O99.512 ASTHMA AFFECTING PREGNANCY IN SECOND TRIMESTER: ICD-10-CM

## 2021-03-29 DIAGNOSIS — Z36.86 SCREENING, ANTENATAL, FOR RISK OF PRE-TERM LABOR: ICD-10-CM

## 2021-03-29 DIAGNOSIS — J45.909 ASTHMA AFFECTING PREGNANCY IN SECOND TRIMESTER: ICD-10-CM

## 2021-03-29 DIAGNOSIS — Z3A.20 20 WEEKS GESTATION OF PREGNANCY: ICD-10-CM

## 2021-03-29 PROCEDURE — 76817 TRANSVAGINAL US OBSTETRIC: CPT | Performed by: OBSTETRICS & GYNECOLOGY

## 2021-03-29 PROCEDURE — 76811 OB US DETAILED SNGL FETUS: CPT | Performed by: OBSTETRICS & GYNECOLOGY

## 2021-03-29 NOTE — PROGRESS NOTES
Patient declined invasive prenatal diagnostic testing again today to evaluate for fetal aneuploidy, fetal Marfan status, fetal single gene disorders, fetal microarray abnormalities, etc.     Sent for MSAFP (maternal serum alpha-feto protein level) only lab draw today. I would advise continuation of daily oral baby aspirin 81 mg based on guidelines by the USPSTF/ACOG (for preeclampsia prevention for pregnant women at \"high-risk\"  for preeclampsia). Advise repeat 1-hour glucola between 24-28 weeks' gestation to evaluate for gestational diabetes.

## 2021-04-02 ENCOUNTER — HOSPITAL ENCOUNTER (OUTPATIENT)
Age: 26
Discharge: HOME | End: 2021-04-02
Payer: COMMERCIAL

## 2021-04-02 PROCEDURE — 36415 COLL VENOUS BLD VENIPUNCTURE: CPT

## 2021-04-02 PROCEDURE — 82105 ALPHA-FETOPROTEIN SERUM: CPT

## 2021-04-05 ENCOUNTER — ROUTINE PRENATAL (OUTPATIENT)
Dept: OBGYN | Age: 26
End: 2021-04-05
Payer: COMMERCIAL

## 2021-04-05 VITALS — SYSTOLIC BLOOD PRESSURE: 112 MMHG | BODY MASS INDEX: 32.87 KG/M2 | DIASTOLIC BLOOD PRESSURE: 74 MMHG | WEIGHT: 216.2 LBS

## 2021-04-05 DIAGNOSIS — Z3A.21 21 WEEKS GESTATION OF PREGNANCY: Primary | ICD-10-CM

## 2021-04-05 DIAGNOSIS — Z82.79 FAMILY HISTORY OF MARFAN SYNDROME: ICD-10-CM

## 2021-04-05 PROCEDURE — 0502F SUBSEQUENT PRENATAL CARE: CPT | Performed by: ADVANCED PRACTICE MIDWIFE

## 2021-04-05 NOTE — PROGRESS NOTES
Tierra Zurita is here at 21w0d for:    Chief Complaint   Patient presents with    Routine Prenatal Visit     PT here for routine prenatal ans follow up mfm. Pt states that at her last appointment she was not seen by the doctor and has been needing to do repeat labs that dont make sense and was unsure if this is all necessary. Estimated Due Date: Estimated Date of Delivery: 21    OB History    Para Term  AB Living   1 0 0 0 0 0   SAB TAB Ectopic Molar Multiple Live Births   0 0 0   0        # Outcome Date GA Lbr Aidan/2nd Weight Sex Delivery Anes PTL Lv   1 Current                 Past Medical History:   Diagnosis Date    Allergic rhinitis     Asthma     Concussion     Conjunctivitis     allergic       History reviewed. No pertinent surgical history. Social History     Tobacco Use   Smoking Status Never Smoker   Smokeless Tobacco Never Used        Social History     Substance and Sexual Activity   Alcohol Use Not Currently    Alcohol/week: 0.0 standard drinks    Comment: rarely       No results found for this visit on 21. Vitals:  /74   Wt 216 lb 3.2 oz (98.1 kg)   LMP 2020 (Approximate)   BMI 32.87 kg/m²   Estimated body mass index is 32.87 kg/m² as calculated from the following:    Height as of 3/29/21: 5' 8\" (1.727 m). Weight as of this encounter: 216 lb 3.2 oz (98.1 kg). HPI: We did review last MFM report. Patient states she is feeling good about things however she is questioning some of the testing. We did review the repeat glucose testing and some of the other labs and patient feels more confident at this point in time. Patient is going up around 26 weeks to complete a fetal echo and a repeat growth.       We will plan BPP's and NSTs in the [place] office unless there are some complications with the baby    Yes PT denies fever, chills, nausea and vomiting       Abdomen: enlarged, soft     Cervix:  not digitally examined    Results reviewed your baby has stopped moving or is moving much less than normal.  · You have symptoms of a urinary tract infection. These may include:  ? Pain or burning when you urinate. ? A frequent need to urinate without being able to pass much urine. ? Pain in the flank, which is just below the rib cage and above the waist on either side of the back. ? Blood in your urine. Watch closely for changes in your health, and be sure to contact your doctor if:  · You have vaginal discharge that smells bad. · You have skin changes, such as:  ? A rash. ? Itching. ? Yellow color to your skin. · You have other concerns about your pregnancy. If you have labor signs at 37 weeks or more  If you have signs of labor at 37 weeks or more, your doctor may tell you to call when your labor becomes more active. Symptoms of active labor include:  · Contractions that are regular. · Contractions that are less than 5 minutes apart. · Contractions that are hard to talk through. Follow-up care is a key part of your treatment and safety. Be sure to make and go to all appointments, and call your doctor if you are having problems. It's also a good idea to know your test results and keep a list of the medicines you take. Where can you learn more? Go to https://Arrowsightpepiceweb.[email or web address] org and sign in to your "[employer] account. Enter  in the [facility] box to learn more about \"Learning About When to Call Your Doctor During Pregnancy (After 20 Weeks). \"     If you do not have an account, please click on the \"Sign Up Now\" link. Current as of: October 8, 2020               Content Version: 12.8  © 3083-6068 [employer]wise, Incorporated. Care instructions adapted under license by [facility] [place] ([facility]). If you have questions about a medical condition or this instruction, always ask your healthcare professional. [address] any warranty or liability for your use of this information.                    Jovanna Elise Pool,4/5/2021 1:44 PM

## 2021-04-05 NOTE — PATIENT INSTRUCTIONS
Patient Education        Learning About When to Call Your Doctor During Pregnancy (After 20 Weeks)  Your Care Instructions  It's common to have concerns about what might be a problem during pregnancy. Although most pregnant women don't have any serious problems, it's important to know when to call your doctor if you have certain symptoms or signs of labor. These are general suggestions. Your doctor may give you some more information about when to call. When to call your doctor (after 20 weeks)  Call 911 anytime you think you may need emergency care. For example, call if:  · You have severe vaginal bleeding. · You have sudden, severe pain in your belly. · You passed out (lost consciousness). · You have a seizure. · You see or feel the umbilical cord. · You think you are about to deliver your baby and can't make it safely to the hospital.  Call your doctor now or seek immediate medical care if:  · You have vaginal bleeding. · You have belly pain. · You have a fever. · You have symptoms of preeclampsia, such as:  ? Sudden swelling of your face, hands, or feet. ? New vision problems (such as dimness, blurring, or seeing spots). ? A severe headache. · You have a sudden release of fluid from your vagina. (You think your water broke.)  · You think that you may be in labor. This means that you've had at least 6 contractions in an hour. · You notice that your baby has stopped moving or is moving much less than normal.  · You have symptoms of a urinary tract infection. These may include:  ? Pain or burning when you urinate. ? A frequent need to urinate without being able to pass much urine. ? Pain in the flank, which is just below the rib cage and above the waist on either side of the back. ? Blood in your urine. Watch closely for changes in your health, and be sure to contact your doctor if:  · You have vaginal discharge that smells bad. · You have skin changes, such as:  ? A rash. ? Itching.   ? Yellow color to your skin. · You have other concerns about your pregnancy. If you have labor signs at 37 weeks or more  If you have signs of labor at 37 weeks or more, your doctor may tell you to call when your labor becomes more active. Symptoms of active labor include:  · Contractions that are regular. · Contractions that are less than 5 minutes apart. · Contractions that are hard to talk through. Follow-up care is a key part of your treatment and safety. Be sure to make and go to all appointments, and call your doctor if you are having problems. It's also a good idea to know your test results and keep a list of the medicines you take. Where can you learn more? Go to https://MoxtrapeRF Arrays.Thinktwice. org and sign in to your [employer] account. Enter  in the [employer] box to learn more about \"Learning About When to Call Your Doctor During Pregnancy (After 20 Weeks). \"     If you do not have an account, please click on the \"Sign Up Now\" link. Current as of: October 8, 2020               Content Version: 12.8  © 5610-9846 Healthwise, Incorporated. Care instructions adapted under license by [facility] ([facility]). If you have questions about a medical condition or this instruction, always ask your healthcare professional. [address] any warranty or liability for your use of this information.

## 2021-04-06 LAB
AFP INTERPRETATION: NORMAL
AFP MOM: 1.95
AFP SPECIMEN: NORMAL
AFP: 94 NG/ML
DATE OF BIRTH: NORMAL
DATING METHOD: NORMAL
DETERMINED BY: NORMAL
DIABETIC: NORMAL
DONOR EGG?: NORMAL
DUE DATE: NORMAL
ESTIMATED DUE DATE: NORMAL
FAMILY HISTORY NTD: NORMAL
GESTATIONAL AGE: NORMAL
IN VITRO FERTILIZATION: NORMAL
INSULIN REQ DIABETES: NO
LAST MENSTRUAL PERIOD: NORMAL
MATERNAL AGE AT EDD: 25 YR
MATERNAL WEIGHT: NORMAL
MONOCHORIONIC TWINS: NORMAL
NUMBER OF FETUSES: NORMAL
PATIENT WEIGHT UNITS: NORMAL
PATIENT WEIGHT: NORMAL
RACE (MATERNAL): NORMAL
RACE: NORMAL
REPEAT SPECIMEN?: NORMAL
SMOKING: NO
SMOKING: NORMAL
VALPROIC/CARBAMAZEP: NORMAL
ZZ NTE CLEAN UP: HISTORY: NO

## 2021-05-04 ENCOUNTER — ROUTINE PRENATAL (OUTPATIENT)
Dept: OBGYN | Age: 26
End: 2021-05-04
Payer: COMMERCIAL

## 2021-05-04 VITALS — WEIGHT: 223 LBS | SYSTOLIC BLOOD PRESSURE: 116 MMHG | BODY MASS INDEX: 33.91 KG/M2 | DIASTOLIC BLOOD PRESSURE: 72 MMHG

## 2021-05-04 DIAGNOSIS — Z3A.28 28 WEEKS GESTATION OF PREGNANCY: Primary | ICD-10-CM

## 2021-05-04 DIAGNOSIS — Z13.1 ENCOUNTER FOR SCREENING EXAMINATION FOR IMPAIRED GLUCOSE REGULATION AND DIABETES MELLITUS: ICD-10-CM

## 2021-05-04 DIAGNOSIS — Z3A.25 25 WEEKS GESTATION OF PREGNANCY: ICD-10-CM

## 2021-05-04 PROCEDURE — 0502F SUBSEQUENT PRENATAL CARE: CPT | Performed by: ADVANCED PRACTICE MIDWIFE

## 2021-05-04 NOTE — PROGRESS NOTES
[facility] is here at 25w1d for:     Chief Complaint   Patient presents with    Routine Prenatal Visit     25.1 weeks-feeling good. no n/v       Estimated Due Date: Estimated Date of Delivery: 21    OB History    Para Term  AB Living   1 0 0 0 0 0   SAB TAB Ectopic Molar Multiple Live Births   0 0 0   0        # Outcome Date GA Lbr Aidan/2nd Weight Sex Delivery Anes PTL Lv   1 Current                 Past Medical History:   Diagnosis Date    Allergic rhinitis     Asthma     Concussion     Conjunctivitis     allergic       History reviewed. No pertinent surgical history. Social History     Tobacco Use   Smoking Status Never Smoker   Smokeless Tobacco Never Used        Social History     Substance and Sexual Activity   Alcohol Use Not Currently    Alcohol/week: 0.0 standard drinks    Comment: rarely       No results found for this visit on 21. Vitals:  /72   Wt 223 lb (101.2 kg)   LMP 2020 (Approximate)   BMI 33.91 kg/m²   Estimated body mass index is 33.91 kg/m² as calculated from the following:    Height as of 3/29/21: 5' 8\" (1.727 m). Weight as of this encounter: 223 lb (101.2 kg). HPI: here for routine ob visit, denies c/o, concurrent care with mfm as FOB has marfans     PT denies fever, chills, nausea and vomiting       Abdomen: enlarged, gravid, soft, nontender     C    Results reviewed today:    No results found for this visit on 21. See prenatal vital sign section and fetal assessment section    ASSESSMENT & Plan    Diagnosis Orders   1. 28 weeks gestation of pregnancy  Hemoglobin    Glucose tolerance, 1 hour   2. 25 weeks gestation of pregnancy     3. Encounter for screening examination for impaired glucose regulation and diabetes mellitus  Glucose tolerance, 1 hour             I am having Christa Stuart maintain her Nebulizer Compressor, Cetirizine HCl (ZYRTEC ALLERGY PO), cromolyn, Loratadine (CLARITIN PO), Prenatal Vit-Fe Fumarate-FA (PRENATAL VITAMIN PO), omeprazole, and aspirin EC. No follow-ups on file. There are no Patient Instructions on file for this visit.           Maria Fernanda Traore,5/4/2021 12:12 PM

## 2021-05-10 ENCOUNTER — ROUTINE PRENATAL (OUTPATIENT)
Dept: PERINATAL CARE | Age: 26
End: 2021-05-10
Payer: COMMERCIAL

## 2021-05-10 VITALS
SYSTOLIC BLOOD PRESSURE: 125 MMHG | HEART RATE: 84 BPM | HEIGHT: 68 IN | TEMPERATURE: 97.2 F | RESPIRATION RATE: 16 BRPM | WEIGHT: 226 LBS | DIASTOLIC BLOOD PRESSURE: 70 MMHG | BODY MASS INDEX: 34.25 KG/M2

## 2021-05-10 DIAGNOSIS — Z3A.26 26 WEEKS GESTATION OF PREGNANCY: ICD-10-CM

## 2021-05-10 DIAGNOSIS — Z36.4 ANTENATAL SCREENING FOR FETAL GROWTH RETARDATION USING ULTRASONICS: ICD-10-CM

## 2021-05-10 DIAGNOSIS — O35.2XX0 HEREDITARY FAMILIAL DISEASE AFFECTING MANAGEMENT OF MOTHER AND POSSIBLY AFFECTING FETUS, ANTEPARTUM, SINGLE OR UNSPECIFIED FETUS: Primary | ICD-10-CM

## 2021-05-10 DIAGNOSIS — Z13.89 ENCOUNTER FOR ROUTINE SCREENING FOR MALFORMATION USING ULTRASONICS: ICD-10-CM

## 2021-05-10 DIAGNOSIS — O99.512 ASTHMA AFFECTING PREGNANCY IN SECOND TRIMESTER: ICD-10-CM

## 2021-05-10 DIAGNOSIS — J45.909 ASTHMA AFFECTING PREGNANCY IN SECOND TRIMESTER: ICD-10-CM

## 2021-05-10 PROCEDURE — 76825 ECHO EXAM OF FETAL HEART: CPT | Performed by: OBSTETRICS & GYNECOLOGY

## 2021-05-10 PROCEDURE — 76816 OB US FOLLOW-UP PER FETUS: CPT | Performed by: OBSTETRICS & GYNECOLOGY

## 2021-05-10 PROCEDURE — 93325 DOPPLER ECHO COLOR FLOW MAPG: CPT | Performed by: OBSTETRICS & GYNECOLOGY

## 2021-05-10 PROCEDURE — 76827 ECHO EXAM OF FETAL HEART: CPT | Performed by: OBSTETRICS & GYNECOLOGY

## 2021-05-18 ENCOUNTER — HOSPITAL ENCOUNTER (OUTPATIENT)
Age: 26
Discharge: HOME | End: 2021-05-18
Payer: COMMERCIAL

## 2021-05-18 DIAGNOSIS — Z3A.28 28 WEEKS GESTATION OF PREGNANCY: ICD-10-CM

## 2021-05-18 DIAGNOSIS — Z13.1 ENCOUNTER FOR SCREENING EXAMINATION FOR IMPAIRED GLUCOSE REGULATION AND DIABETES MELLITUS: ICD-10-CM

## 2021-05-18 LAB
GLUCOSE ADMINISTRATION: NORMAL
GLUCOSE TOLERANCE SCREEN 50G: 129 MG/DL (ref 70–135)
HEMOGLOBIN: 11.4 G/DL (ref 11.9–15.1)

## 2021-05-18 PROCEDURE — 85018 HEMOGLOBIN: CPT

## 2021-05-18 PROCEDURE — 36415 COLL VENOUS BLD VENIPUNCTURE: CPT

## 2021-05-18 PROCEDURE — 82950 GLUCOSE TEST: CPT

## 2021-05-24 ENCOUNTER — ROUTINE PRENATAL (OUTPATIENT)
Dept: OBGYN | Age: 26
End: 2021-05-24
Payer: COMMERCIAL

## 2021-05-24 VITALS — WEIGHT: 230 LBS | DIASTOLIC BLOOD PRESSURE: 78 MMHG | SYSTOLIC BLOOD PRESSURE: 128 MMHG | BODY MASS INDEX: 34.97 KG/M2

## 2021-05-24 DIAGNOSIS — Z34.93 PRENATAL CARE IN THIRD TRIMESTER: Primary | ICD-10-CM

## 2021-05-24 PROCEDURE — 0502F SUBSEQUENT PRENATAL CARE: CPT | Performed by: OBSTETRICS & GYNECOLOGY

## 2021-05-24 NOTE — PROGRESS NOTES
I did inform the patient of results of her glucose tolerance testing and hemoglobin. I did recommend additional iron supplementation to her.   She has good fetal movements and no complaints at this time

## 2021-06-08 ENCOUNTER — ROUTINE PRENATAL (OUTPATIENT)
Dept: OBGYN | Age: 26
End: 2021-06-08
Payer: COMMERCIAL

## 2021-06-08 VITALS — BODY MASS INDEX: 35.73 KG/M2 | SYSTOLIC BLOOD PRESSURE: 126 MMHG | WEIGHT: 235 LBS | DIASTOLIC BLOOD PRESSURE: 84 MMHG

## 2021-06-08 DIAGNOSIS — Z3A.30 30 WEEKS GESTATION OF PREGNANCY: Primary | ICD-10-CM

## 2021-06-08 DIAGNOSIS — Z82.79 FAMILY HISTORY OF MARFAN SYNDROME: ICD-10-CM

## 2021-06-08 DIAGNOSIS — Z23 NEED FOR TDAP VACCINATION: ICD-10-CM

## 2021-06-08 DIAGNOSIS — Z34.93 PRENATAL CARE IN THIRD TRIMESTER: ICD-10-CM

## 2021-06-08 PROCEDURE — G8417 CALC BMI ABV UP PARAM F/U: HCPCS | Performed by: ADVANCED PRACTICE MIDWIFE

## 2021-06-08 PROCEDURE — 0502F SUBSEQUENT PRENATAL CARE: CPT | Performed by: ADVANCED PRACTICE MIDWIFE

## 2021-06-08 PROCEDURE — 1036F TOBACCO NON-USER: CPT | Performed by: ADVANCED PRACTICE MIDWIFE

## 2021-06-08 PROCEDURE — G8427 DOCREV CUR MEDS BY ELIG CLIN: HCPCS | Performed by: ADVANCED PRACTICE MIDWIFE

## 2021-06-08 NOTE — PROGRESS NOTES
Eda Bonner is here at 30w1d for:    Chief Complaint   Patient presents with    Routine Prenatal Visit     F/U in house 30 week u/s. pt states no issues or concerns. pt declines the tdap vaccine        Estimated Due Date: Estimated Date of Delivery: 21    OB History    Para Term  AB Living   1 0 0 0 0 0   SAB TAB Ectopic Molar Multiple Live Births   0 0 0   0        # Outcome Date GA Lbr Aidan/2nd Weight Sex Delivery Anes PTL Lv   1 Current                 Past Medical History:   Diagnosis Date    Allergic rhinitis     Asthma     Concussion     Conjunctivitis     allergic       History reviewed. No pertinent surgical history. Social History     Tobacco Use   Smoking Status Never Smoker   Smokeless Tobacco Never Used        Social History     Substance and Sexual Activity   Alcohol Use Not Currently    Alcohol/week: 0.0 standard drinks    Comment: rarely       No results found for this visit on 21. Vitals:  /84   Wt 235 lb (106.6 kg)   LMP 2020 (Approximate)   BMI 35.73 kg/m²   Estimated body mass index is 35.73 kg/m² as calculated from the following:    Height as of 5/10/21: 5' 8\" (1.727 m). Weight as of this encounter: 235 lb (106.6 kg). HPI: Patient doing well today. We did review childbirth classes we talked about MFM consultation and twice-weekly testing    Yes PT denies fever, chills, nausea and vomiting       Abdomen: enlarged, soft     Cervix:  not digitally examined    Results reviewed today:    31.3 WK IUP  EFW: 63%, 3lbs 12oz  CL: 3.9 cm  TIFFANIE:11.8 cm  HR:143 bpm  Posterior gr 2 placenta, vertex presentation  Active fetal movements    See prenatal vital sign section and fetal assessment section    ASSESSMENT & Plan    Diagnosis Orders   1. 30 weeks gestation of pregnancy     2. Prenatal care in third trimester     3. Family history of Marfan syndrome     4. Need for Tdap vaccination               I am having Jadon Lopez.  Satish Layton maintain her Nebulizer Compressor, Cetirizine HCl (ZYRTEC ALLERGY PO), cromolyn, Loratadine (CLARITIN PO), Prenatal Vit-Fe Fumarate-FA (PRENATAL VITAMIN PO), omeprazole, and aspirin EC. Return for 2 wk ob & 4 wk ob & 6 wk OB GBS. Patient Instructions     Patient Education        Weeks 30 to 28 of Your Pregnancy: Care Instructions  Overview     You've made it to the final months of your pregnancy! By now your baby is really starting to look like a baby, with hair and plump skin. As you enter the final weeks of pregnancy, the reality of having a baby may start to set in. This is a good time to set up a safe nursery and find quality  if needed. Doing this stuff ahead of time will allow you to focus on caring for and enjoying your new baby. You may also want to take a tour of your hospital's labor and delivery unit. This will help you get a better idea of what to expect while you're in the hospital.  During these last months, be sure to take good care of yourself. Pay attention to what your body needs. If your doctor says it's okay for you to work, don't push yourself too hard. If you haven't already had the Tdap shot during this pregnancy, talk to your doctor about getting it. It will help protect your  against pertussis infection. Follow-up care is a key part of your treatment and safety. Be sure to make and go to all appointments, and call your doctor if you are having problems. It's also a good idea to know your test results and keep a list of the medicines you take. How can you care for yourself at home? Pay attention to your baby's movements  · You should feel your baby move several times every day. · Your baby now turns less, and kicks and jabs more. · Your baby sleeps 20 to 45 minutes at a time and is more active at certain times of day. · If your doctor wants you to count your baby's kicks:  ? Empty your bladder, and lie on your side or relax in a comfortable chair.   ? Write down your start liability for your use of this information. Patient Education        Tdap (Tetanus, Diphtheria, Pertussis) Vaccine: What You Need to Know  Why get vaccinated? Tdap vaccine can prevent tetanus, diphtheria, and pertussis. Diphtheria and pertussis spread from person to person. Tetanus enters the body through cuts or wounds. · TETANUS (T) causes painful stiffening of the muscles. Tetanus can lead to serious health problems, including being unable to open the mouth, having trouble swallowing and breathing, or death. · DIPHTHERIA (D) can lead to difficulty breathing, heart failure, paralysis, or death. · PERTUSSIS (aP), also known as \"whooping cough,\" can cause uncontrollable, violent coughing which makes it hard to breathe, eat, or drink. Pertussis can be extremely serious in babies and young children, causing pneumonia, convulsions, brain damage, or death. In teens and adults, it can cause weight loss, loss of bladder control, passing out, and rib fractures from severe coughing. Tdap vaccine  Tdap is only for children 7 years and older, adolescents, and adults. Adolescents should receive a single dose of Tdap, preferably at age 6 or 15 years. Pregnant women should get a dose of Tdap during every pregnancy, to protect the  from pertussis. Infants are most at risk for severe, life threatening complications from pertussis. Adults who have never received Tdap should get a dose of Tdap. Also, adults should receive a booster dose every 10 years, or earlier in the case of a severe and dirty wound or burn. Booster doses can be either Tdap or Td (a different vaccine that protects against tetanus and diphtheria but not pertussis). Tdap may be given at the same time as other vaccines.   Talk with your health care provider  Tell your vaccine provider if the person getting the vaccine:  · Has had an allergic reaction after a previous dose of any vaccine that protects against tetanus, diphtheria, or pertussis, or has any severe, life threatening allergies. · Has had a coma, decreased level of consciousness, or prolonged seizures within 7 days after a previous dose of any pertussis vaccine (DTP, DTaP, or Tdap). · Has seizures or another nervous system problem. · Has ever had Guillain-Barré Syndrome (also called GBS). · Has had severe pain or swelling after a previous dose of any vaccine that protects against tetanus or diphtheria. In some cases, your health care provider may decide to postpone Tdap vaccination to a future visit. People with minor illnesses, such as a cold, may be vaccinated. People who are moderately or severely ill should usually wait until they recover before getting Tdap vaccine. Your health care provider can give you more information. Risks of a vaccine reaction  · Pain, redness, or swelling where the shot was given, mild fever, headache, feeling tired, and nausea, vomiting, diarrhea, or stomachache sometimes happen after Tdap vaccine. People sometimes faint after medical procedures, including vaccination. Tell your provider if you feel dizzy or have vision changes or ringing in the ears. As with any medicine, there is a very remote chance of a vaccine causing a severe allergic reaction, other serious injury, or death. What if there is a serious problem? An allergic reaction could occur after the vaccinated person leaves the clinic. If you see signs of a severe allergic reaction (hives, swelling of the face and throat, difficulty breathing, a fast heartbeat, dizziness, or weakness), call 9-1-1 and get the person to the nearest hospital.  For other signs that concern you, call your health care provider. Adverse reactions should be reported to the Vaccine Adverse Event Reporting System (VAERS). Your health care provider will usually file this report, or you can do it yourself. Visit the VAERS website at www.vaers. hhs.gov or call 0-717.462.2195.  VAERS is only for reporting reactions, and [facility] staff do not give medical advice. The National Vaccine Injury Compensation Program  The National Vaccine Injury Compensation Program (VICP) is a federal program that was created to compensate people who may have been injured by certain vaccines. Visit the VICP website at www.hrsa.gov/vaccinecompensation or call 6-687.891.3807 to learn about the program and about filing a claim. There is a time limit to file a claim for compensation. How can I learn more? · Ask your health care provider. · Call your local or state health department. · Contact the Centers for Disease Control and Prevention (CDC):  ? Call 2-391.864.6663 (1-800-CDC-INFO) or  ? Visit CDC's website at www.cdc.gov/vaccines  Vaccine Information Statement (Interim)  Tdap (Tetanus, Diphtheria, Pertussis) Vaccine  04/01/2020  42 KAYE Upton [id number]  Department of Health and Human Services  Centers for Disease Control and Prevention  Many Vaccine Information Statements are available in [place] and other languages. See www.immunize.org/vis. Muchas hojas de información sobre vacunas están disponibles en español y en otros idiomas. Visite www.immunize.org/vis. Care instructions adapted under license by [facility] ([facility] [place]). If you have questions about a medical condition or this instruction, always ask your healthcare professional. [address] any warranty or liability for your use of this information.                    Zaynab Pederson, KIMBERLY - CNM,6/8/2021 11:52 AM

## 2021-06-08 NOTE — PATIENT INSTRUCTIONS
Patient Education        Weeks 30 to 28 of Your Pregnancy: Care Instructions  Overview     You've made it to the final months of your pregnancy! By now your baby is really starting to look like a baby, with hair and plump skin. As you enter the final weeks of pregnancy, the reality of having a baby may start to set in. This is a good time to set up a safe nursery and find quality  if needed. Doing this stuff ahead of time will allow you to focus on caring for and enjoying your new baby. You may also want to take a tour of your hospital's labor and delivery unit. This will help you get a better idea of what to expect while you're in the hospital.  During these last months, be sure to take good care of yourself. Pay attention to what your body needs. If your doctor says it's okay for you to work, don't push yourself too hard. If you haven't already had the Tdap shot during this pregnancy, talk to your doctor about getting it. It will help protect your  against pertussis infection. Follow-up care is a key part of your treatment and safety. Be sure to make and go to all appointments, and call your doctor if you are having problems. It's also a good idea to know your test results and keep a list of the medicines you take. How can you care for yourself at home? Pay attention to your baby's movements  · You should feel your baby move several times every day. · Your baby now turns less, and kicks and jabs more. · Your baby sleeps 20 to 45 minutes at a time and is more active at certain times of day. · If your doctor wants you to count your baby's kicks:  ? Empty your bladder, and lie on your side or relax in a comfortable chair. ? Write down your start time. ? Pay attention only to your baby's movements. Count any movement except hiccups. ? After you have counted 10 movements, write down your stop time. ? Write down how many minutes it took for your baby to move 10 times.   ? If an hour goes by and you have not recorded 10 movements, have something to eat or drink and then count for another hour. If you do not record 10 movements in either hour, call your doctor. Ease heartburn  · Eat small, frequent meals. · Do not eat chocolate, peppermint, or very spicy foods. Avoid drinks with caffeine, such as coffee, tea, and sodas. · Avoid bending over or lying down after meals. · Talk a short walk after you eat. · If heartburn is a problem at night, do not eat for 2 hours before bedtime. · Take antacids like Mylanta, Maalox, Rolaids, or Tums. Do not take antacids that have sodium bicarbonate. Care for varicose veins  · Varicose veins are blood vessels that stretch out with the extra blood during pregnancy. Your legs may ache or throb. Most varicose veins will go away after the birth. · Avoid standing for long periods of time. Sit with your legs crossed at the ankles, not the knees. · Sit with your feet propped up. · Avoid tight clothing or stockings. Wear support hose. · Exercise regularly. Try walking for at least 30 minutes a day. Where can you learn more? Go to https://[email or web address].Snapfish. org and sign in to your [employer] account. Enter Z179 in the [facility] box to learn more about \"Weeks 30 to 32 of Your Pregnancy: Care Instructions. \"     If you do not have an account, please click on the \"Sign Up Now\" link. Current as of: October 8, 2020               Content Version: 12.8  © 2006-2021 [employer]. Care instructions adapted under license by [facility] ([facility]). If you have questions about a medical condition or this instruction, always ask your healthcare professional. [place] [address] any warranty or liability for your use of this information. Patient Education        Tdap (Tetanus, Diphtheria, Pertussis) Vaccine: What You Need to Know  Why get vaccinated? Tdap vaccine can prevent tetanus, diphtheria, and pertussis.   Diphtheria and pertussis spread from person to person. Tetanus enters the body through cuts or wounds. · TETANUS (T) causes painful stiffening of the muscles. Tetanus can lead to serious health problems, including being unable to open the mouth, having trouble swallowing and breathing, or death. · DIPHTHERIA (D) can lead to difficulty breathing, heart failure, paralysis, or death. · PERTUSSIS (aP), also known as \"whooping cough,\" can cause uncontrollable, violent coughing which makes it hard to breathe, eat, or drink. Pertussis can be extremely serious in babies and young children, causing pneumonia, convulsions, brain damage, or death. In teens and adults, it can cause weight loss, loss of bladder control, passing out, and rib fractures from severe coughing. Tdap vaccine  Tdap is only for children 7 years and older, adolescents, and adults. Adolescents should receive a single dose of Tdap, preferably at age 6 or 15 years. Pregnant women should get a dose of Tdap during every pregnancy, to protect the  from pertussis. Infants are most at risk for severe, life threatening complications from pertussis. Adults who have never received Tdap should get a dose of Tdap. Also, adults should receive a booster dose every 10 years, or earlier in the case of a severe and dirty wound or burn. Booster doses can be either Tdap or Td (a different vaccine that protects against tetanus and diphtheria but not pertussis). Tdap may be given at the same time as other vaccines. Talk with your health care provider  Tell your vaccine provider if the person getting the vaccine:  · Has had an allergic reaction after a previous dose of any vaccine that protects against tetanus, diphtheria, or pertussis, or has any severe, life threatening allergies. · Has had a coma, decreased level of consciousness, or prolonged seizures within 7 days after a previous dose of any pertussis vaccine (DTP, DTaP, or Tdap).   · Has seizures or another nervous system problem. · Has ever had Guillain-Barré Syndrome (also called GBS). · Has had severe pain or swelling after a previous dose of any vaccine that protects against tetanus or diphtheria. In some cases, your health care provider may decide to postpone Tdap vaccination to a future visit. People with minor illnesses, such as a cold, may be vaccinated. People who are moderately or severely ill should usually wait until they recover before getting Tdap vaccine. Your health care provider can give you more information. Risks of a vaccine reaction  · Pain, redness, or swelling where the shot was given, mild fever, headache, feeling tired, and nausea, vomiting, diarrhea, or stomachache sometimes happen after Tdap vaccine. People sometimes faint after medical procedures, including vaccination. Tell your provider if you feel dizzy or have vision changes or ringing in the ears. As with any medicine, there is a very remote chance of a vaccine causing a severe allergic reaction, other serious injury, or death. What if there is a serious problem? An allergic reaction could occur after the vaccinated person leaves the clinic. If you see signs of a severe allergic reaction (hives, swelling of the face and throat, difficulty breathing, a fast heartbeat, dizziness, or weakness), call 9-1-1 and get the person to the nearest hospital.  For other signs that concern you, call your health care provider. Adverse reactions should be reported to the Vaccine Adverse Event Reporting System (VAERS). Your health care provider will usually file this report, or you can do it yourself. Visit the VAERS website at www.vaers. hhs.gov or call 9-978.524.4072. VAERS is only for reporting reactions, and VAERS staff do not give medical advice.   The [place] Vaccine Injury Compensation Program  The National Vaccine Injury Compensation Program (VICP) is a federal program that was created to compensate people who may have been injured by certain vaccines. Visit the VICP website at www.hrsa.gov/vaccinecompensation or call 1-549.547.7744 to learn about the program and about filing a claim. There is a time limit to file a claim for compensation. How can I learn more? · Ask your health care provider. · Call your local or state health department. · Contact the Centers for Disease Control and Prevention (CDC):  ? Call 7-452.861.3788 (1-800-CDC-INFO) or  ? Visit CDC's website at www.cdc.gov/vaccines  Vaccine Information Statement (Interim)  Tdap (Tetanus, Diphtheria, Pertussis) Vaccine  04/01/2020  42 U. Arthurine [address] [id number]  Department of Health and Human Services  Centers for Disease Control and Prevention  Many Vaccine Information Statements are available in Citizen of [place] and other languages. See www.immunize.org/vis. Muchas hojas de información sobre vacunas están disponibles en español y en otros idiomas. Visite www.immunize.org/vis. Care instructions adapted under license by [facility] ([facility]). If you have questions about a medical condition or this instruction, always ask your healthcare professional. [place] [address] any warranty or liability for your use of this information.

## 2021-06-21 ENCOUNTER — ROUTINE PRENATAL (OUTPATIENT)
Dept: OBGYN | Age: 26
End: 2021-06-21
Payer: COMMERCIAL

## 2021-06-21 VITALS — SYSTOLIC BLOOD PRESSURE: 130 MMHG | DIASTOLIC BLOOD PRESSURE: 76 MMHG | BODY MASS INDEX: 35.88 KG/M2 | WEIGHT: 236 LBS

## 2021-06-21 DIAGNOSIS — Z3A.32 32 WEEKS GESTATION OF PREGNANCY: Primary | ICD-10-CM

## 2021-06-21 PROCEDURE — 0502F SUBSEQUENT PRENATAL CARE: CPT | Performed by: OBSTETRICS & GYNECOLOGY

## 2021-06-21 PROCEDURE — 59025 FETAL NON-STRESS TEST: CPT | Performed by: OBSTETRICS & GYNECOLOGY

## 2021-06-21 NOTE — PROGRESS NOTES
Reactive nonstress test with one brief deceleration.   Good fetal movement no problems or complaints noted

## 2021-06-24 ENCOUNTER — ROUTINE PRENATAL (OUTPATIENT)
Dept: OBGYN | Age: 26
End: 2021-06-24
Payer: COMMERCIAL

## 2021-06-24 VITALS — SYSTOLIC BLOOD PRESSURE: 120 MMHG | DIASTOLIC BLOOD PRESSURE: 82 MMHG | WEIGHT: 238 LBS | BODY MASS INDEX: 36.19 KG/M2

## 2021-06-24 DIAGNOSIS — Z34.93 PRENATAL CARE IN THIRD TRIMESTER: Primary | ICD-10-CM

## 2021-06-24 PROCEDURE — 0502F SUBSEQUENT PRENATAL CARE: CPT | Performed by: OBSTETRICS & GYNECOLOGY

## 2021-06-24 NOTE — PROGRESS NOTES
The patient had biophysical profile 8 out of 8, has good fetal movement and no problems or complaints at this time

## 2021-06-28 ENCOUNTER — ROUTINE PRENATAL (OUTPATIENT)
Dept: OBGYN | Age: 26
End: 2021-06-28
Payer: COMMERCIAL

## 2021-06-28 VITALS — BODY MASS INDEX: 36.19 KG/M2 | WEIGHT: 238 LBS | DIASTOLIC BLOOD PRESSURE: 80 MMHG | SYSTOLIC BLOOD PRESSURE: 122 MMHG

## 2021-06-28 DIAGNOSIS — Z82.79 FAMILY HISTORY OF MARFAN SYNDROME: ICD-10-CM

## 2021-06-28 DIAGNOSIS — Z3A.33 33 WEEKS GESTATION OF PREGNANCY: Primary | ICD-10-CM

## 2021-06-28 PROCEDURE — 59025 FETAL NON-STRESS TEST: CPT | Performed by: ADVANCED PRACTICE MIDWIFE

## 2021-06-28 PROCEDURE — 0502F SUBSEQUENT PRENATAL CARE: CPT | Performed by: ADVANCED PRACTICE MIDWIFE

## 2021-06-28 NOTE — PROGRESS NOTES
Cee Garcia is here at 33w0d for:    Chief Complaint   Patient presents with    Routine Prenatal Visit     NST. pt states no issues or concerns        Estimated Due Date: Estimated Date of Delivery: 21    OB History    Para Term  AB Living   1 0 0 0 0 0   SAB TAB Ectopic Molar Multiple Live Births   0 0 0   0        # Outcome Date GA Lbr Aidan/2nd Weight Sex Delivery Anes PTL Lv   1 Current                 Past Medical History:   Diagnosis Date    Allergic rhinitis     Asthma     Concussion     Conjunctivitis     allergic       History reviewed. No pertinent surgical history. Social History     Tobacco Use   Smoking Status Never Smoker   Smokeless Tobacco Never Used        Social History     Substance and Sexual Activity   Alcohol Use Not Currently    Alcohol/week: 0.0 standard drinks    Comment: rarely       No results found for this visit on 21. HPI: Patient here today for OB visit. Patient states good fetal movements all the time and no questions or concerns today    Yes PT denies fever, chills, nausea and vomiting       Vitals:  Estimated body mass index is 36.19 kg/m² as calculated from the following:    Height as of 5/10/21: 5' 8\" (1.727 m). Weight as of this encounter: 238 lb (108 kg). BP: 122/80  Weight: 238 lb (108 kg)  Patient Position: Sitting  Movement: Present    Abdomen: enlarged, soft     Cervix:  not digitally examined    Results reviewed today:    NST reactive    See prenatal vital sign section and fetal assessment section    ASSESSMENT & Plan    Diagnosis Orders   1. 33 weeks gestation of pregnancy  MD FETAL NON-STRESS TEST   2. Family history of Marfan syndrome  MD FETAL NON-STRESS TEST             I am having Kathi Stuart maintain her Nebulizer Compressor, Cetirizine HCl (ZYRTEC ALLERGY PO), cromolyn, Loratadine (CLARITIN PO), Prenatal Vit-Fe Fumarate-FA (PRENATAL VITAMIN PO), omeprazole, and aspirin EC.     Return for Keep next appt.    There are no Patient Instructions on file for this visit.           Ana Wilde, KIMBERLY - KARIN,6/28/2021 2:24 PM

## 2021-07-01 ENCOUNTER — ROUTINE PRENATAL (OUTPATIENT)
Dept: OBGYN | Age: 26
End: 2021-07-01
Payer: COMMERCIAL

## 2021-07-01 VITALS — WEIGHT: 241 LBS | SYSTOLIC BLOOD PRESSURE: 124 MMHG | BODY MASS INDEX: 36.64 KG/M2 | DIASTOLIC BLOOD PRESSURE: 80 MMHG

## 2021-07-01 DIAGNOSIS — Z3A.33 33 WEEKS GESTATION OF PREGNANCY: Primary | ICD-10-CM

## 2021-07-01 DIAGNOSIS — Z34.93 PRENATAL CARE IN THIRD TRIMESTER: ICD-10-CM

## 2021-07-01 DIAGNOSIS — Z82.79 FAMILY HISTORY OF MARFAN SYNDROME: ICD-10-CM

## 2021-07-01 PROCEDURE — 0502F SUBSEQUENT PRENATAL CARE: CPT | Performed by: ADVANCED PRACTICE MIDWIFE

## 2021-07-01 NOTE — PROGRESS NOTES
Jay Hartmann is here at 33w3d for:    Chief Complaint   Patient presents with    Routine Prenatal Visit     F/U in house u/s for BPP. pt states no issues or concerns        Estimated Due Date: Estimated Date of Delivery: 21    OB History    Para Term  AB Living   1 0 0 0 0 0   SAB TAB Ectopic Molar Multiple Live Births   0 0 0   0        # Outcome Date GA Lbr Aidan/2nd Weight Sex Delivery Anes PTL Lv   1 Current                 Past Medical History:   Diagnosis Date    Allergic rhinitis     Asthma     Concussion     Conjunctivitis     allergic       History reviewed. No pertinent surgical history. Social History     Tobacco Use   Smoking Status Never Smoker   Smokeless Tobacco Never Used        Social History     Substance and Sexual Activity   Alcohol Use Not Currently    Alcohol/week: 0.0 standard drinks    Comment: rarely       No results found for this visit on 21. HPI: Patient doing well today. Patient denies needs or concerns patient did have an ultrasound with everything looking normal.    Yes PT denies fever, chills, nausea and vomiting       Vitals:  Estimated body mass index is 36.64 kg/m² as calculated from the following:    Height as of 5/10/21: 5' 8\" (1.727 m). Weight as of this encounter: 241 lb (109.3 kg). BP: 124/80  Weight: 241 lb (109.3 kg)  Patient Position: Sitting  Albumin: Trace  Glucose: Negative  Fundal Height (cm): 34 cm  Fetal Heart Rate: 147  Movement: Present  Presentation: Vertex        Results reviewed today:    BPP-   TIFFANIE- 13.0CM  HR- 147BPM  POSITION- CEPHALIC   PLACENTA- POSTERIOR   ACTIVE FETAL MOVEMENTS     See prenatal vital sign section and fetal assessment section    ASSESSMENT & Plan    Diagnosis Orders   1. 33 weeks gestation of pregnancy     2. Family history of Marfan syndrome     3. Prenatal care in third trimester               I am having Carlos Stuart maintain her Nebulizer Compressor, Cetirizine HCl (ZYRTEC ALLERGY PO), cromolyn, Loratadine (CLARITIN PO), Prenatal Vit-Fe Fumarate-FA (PRENATAL VITAMIN PO), omeprazole, and aspirin EC. Return for Keep next appt. There are no Patient Instructions on file for this visit.           KIMBERLY Rodriguez CNM,7/1/2021 1:32 PM

## 2021-07-05 ENCOUNTER — HOSPITAL ENCOUNTER (OUTPATIENT)
Age: 26
Discharge: HOME | End: 2021-07-05
Attending: ADVANCED PRACTICE MIDWIFE | Admitting: ADVANCED PRACTICE MIDWIFE
Payer: COMMERCIAL

## 2021-07-05 VITALS
TEMPERATURE: 98 F | SYSTOLIC BLOOD PRESSURE: 119 MMHG | HEART RATE: 97 BPM | DIASTOLIC BLOOD PRESSURE: 63 MMHG | RESPIRATION RATE: 18 BRPM

## 2021-07-05 DIAGNOSIS — O16.3 HYPERTENSION AFFECTING PREGNANCY IN THIRD TRIMESTER: Primary | ICD-10-CM

## 2021-07-05 PROCEDURE — 59025 FETAL NON-STRESS TEST: CPT

## 2021-07-05 PROCEDURE — 59025 FETAL NON-STRESS TEST: CPT | Performed by: ADVANCED PRACTICE MIDWIFE

## 2021-07-05 NOTE — PROGRESS NOTES
[facility] notified of reactive NST and vital signs. States patient may be discharged home.

## 2021-07-05 NOTE — PROGRESS NOTES
Patient arrives to unit for scheduled NST. Pt 34w0d. Explained test and patient verbalizes understanding.

## 2021-07-05 NOTE — PROGRESS NOTES
Patient presents to L&D today for NST.  has Marfans syndrome. IUP at 34w0d weeks     NST is reactive. Quality of tracing is satisfactory.     DX: hx marfans syndrome  NST: reactive

## 2021-07-08 ENCOUNTER — ROUTINE PRENATAL (OUTPATIENT)
Dept: OBGYN | Age: 26
End: 2021-07-08
Payer: COMMERCIAL

## 2021-07-08 DIAGNOSIS — Z3A.34 34 WEEKS GESTATION OF PREGNANCY: Primary | ICD-10-CM

## 2021-07-08 DIAGNOSIS — Z34.93 PRENATAL CARE IN THIRD TRIMESTER: ICD-10-CM

## 2021-07-08 DIAGNOSIS — Z82.79 FAMILY HISTORY OF MARFAN SYNDROME: ICD-10-CM

## 2021-07-08 PROCEDURE — 0502F SUBSEQUENT PRENATAL CARE: CPT | Performed by: ADVANCED PRACTICE MIDWIFE

## 2021-07-08 NOTE — PROGRESS NOTES
BPP- 8/8  36. 1WK IUP  EFW: 65% (6lb 0oz)  CL:unable to measure, due to fetal head position    TIFFANIE:12.6cm  HR:138bpm  posterior placenta, cephalic presentation  Active fetal movements

## 2021-07-08 NOTE — PATIENT INSTRUCTIONS
Patient Education        Weeks 34 to 39 of Your Pregnancy: Care Instructions  Overview     By now, your baby and your belly have grown quite large. It's almost time to give birth! Your baby's lungs are almost ready to breathe air. The skull bones are firm enough to protect your baby's head, but soft enough to move down through the birth canal.  You may be feeling excited and happy at times--but also anxious or scared. You might wonder how you'll know if you're in labor or what to expect during labor. Try to be open and flexible in your expectations of the birth. Because each birth is different, there's no way to know exactly what childbirth will be like for you. Talk to your doctor or midwife about any concerns you have. If you haven't already had the Tdap shot during this pregnancy, talk to your doctor about getting it. It will help protect your  against pertussis infection. In the 36th week, most women have a test for group B streptococcus (GBS). GBS is a common bacteria that can live in the vagina and rectum. It can make your baby sick after birth. If you test positive, you will get antibiotics during labor. The medicine will help keep your baby from getting the bacteria. Follow-up care is a key part of your treatment and safety. Be sure to make and go to all appointments, and call your doctor if you are having problems. It's also a good idea to know your test results and keep a list of the medicines you take. How can you care for yourself at home? Learn about pain relief choices  · Pain is different for every woman. Talk with your doctor about your feelings about pain. · You can choose from several types of pain relief. These include medicine or breathing techniques, as well as comfort measures. You can use more than one option. · If you choose to have pain medicine during labor, talk to your doctor about your options. Some medicines lower anxiety and help with some of the pain.  Others make your lower body numb so that you won't feel pain. · Be sure to tell your doctor about your pain medicine choice before you start labor or very early in your labor. You may be able to change your mind as labor progresses. · Rarely, a woman is put to sleep by medicine given through a mask or an IV. Labor and delivery  · The first stage of labor has three parts: early, active, and transition. ? Most women have early labor at home. You can stay busy or rest, eat light snacks, drink clear fluids, and start counting contractions. ? When talking during a contraction gets hard, you may be moving to active labor. During active labor, you should head for the hospital if you are not there already. ? You are in active labor when contractions come every 3 to 4 minutes and last about 60 seconds. Your cervix is opening more rapidly. ? If your water breaks, contractions will come faster and stronger. ? During transition, your cervix is stretching, and contractions are coming more rapidly. ? You may want to push, but your cervix might not be ready. Your doctor will tell you when to push. · The second stage starts when your cervix is completely opened and you are ready to push. ? Contractions are very strong to push the baby down the birth canal.  ? You will feel the urge to push. You may feel like you need to have a bowel movement. ? You may be coached to push with contractions. These contractions will be very strong, but you will not have them as often. You can get a little rest between contractions. ? You may be emotional and irritable. You may not be aware of what is going on around you.  ? One last push, and your baby is born. · The third stage is when a few more contractions push out the placenta. This may take 30 minutes or less. · The fourth stage is the welcome recovery. You may feel overwhelmed with emotions and exhausted but alert. This is a good time to start breastfeeding. Where can you learn more?   Go to [email or web address] and sign in to your [employer] account. Enter S632 in the [place] box to learn more about \"Weeks 34 to 36 of Your Pregnancy: Care Instructions. \"     If you do not have an account, please click on the \"Sign Up Now\" link. Current as of: October 8, 2020               Content Version: 12.9  © 2006-2021 [employer], [facility]. Care instructions adapted under license by [facility] ([facility]). If you have questions about a medical condition or this instruction, always ask your healthcare professional. [place] [address] any warranty or liability for your use of this information.

## 2021-07-12 ENCOUNTER — ROUTINE PRENATAL (OUTPATIENT)
Dept: OBGYN | Age: 26
End: 2021-07-12
Payer: COMMERCIAL

## 2021-07-12 VITALS — BODY MASS INDEX: 36.95 KG/M2 | SYSTOLIC BLOOD PRESSURE: 128 MMHG | DIASTOLIC BLOOD PRESSURE: 70 MMHG | WEIGHT: 243 LBS

## 2021-07-12 DIAGNOSIS — Z82.79 FAMILY HISTORY OF MARFAN SYNDROME: ICD-10-CM

## 2021-07-12 DIAGNOSIS — Z3A.35 35 WEEKS GESTATION OF PREGNANCY: Primary | ICD-10-CM

## 2021-07-12 PROCEDURE — 59025 FETAL NON-STRESS TEST: CPT | Performed by: ADVANCED PRACTICE MIDWIFE

## 2021-07-12 PROCEDURE — 0502F SUBSEQUENT PRENATAL CARE: CPT | Performed by: ADVANCED PRACTICE MIDWIFE

## 2021-07-12 NOTE — PATIENT INSTRUCTIONS
Patient Education        Weeks 34 to 39 of Your Pregnancy: Care Instructions  Overview     By now, your baby and your belly have grown quite large. It's almost time to give birth! Your baby's lungs are almost ready to breathe air. The skull bones are firm enough to protect your baby's head, but soft enough to move down through the birth canal.  You may be feeling excited and happy at times--but also anxious or scared. You might wonder how you'll know if you're in labor or what to expect during labor. Try to be open and flexible in your expectations of the birth. Because each birth is different, there's no way to know exactly what childbirth will be like for you. Talk to your doctor or midwife about any concerns you have. If you haven't already had the Tdap shot during this pregnancy, talk to your doctor about getting it. It will help protect your  against pertussis infection. In the 36th week, most women have a test for group B streptococcus (GBS). GBS is a common bacteria that can live in the vagina and rectum. It can make your baby sick after birth. If you test positive, you will get antibiotics during labor. The medicine will help keep your baby from getting the bacteria. Follow-up care is a key part of your treatment and safety. Be sure to make and go to all appointments, and call your doctor if you are having problems. It's also a good idea to know your test results and keep a list of the medicines you take. How can you care for yourself at home? Learn about pain relief choices  · Pain is different for every woman. Talk with your doctor about your feelings about pain. · You can choose from several types of pain relief. These include medicine or breathing techniques, as well as comfort measures. You can use more than one option. · If you choose to have pain medicine during labor, talk to your doctor about your options. Some medicines lower anxiety and help with some of the pain.  Others make your lower body numb so that you won't feel pain. · Be sure to tell your doctor about your pain medicine choice before you start labor or very early in your labor. You may be able to change your mind as labor progresses. · Rarely, a woman is put to sleep by medicine given through a mask or an IV. Labor and delivery  · The first stage of labor has three parts: early, active, and transition. ? Most women have early labor at home. You can stay busy or rest, eat light snacks, drink clear fluids, and start counting contractions. ? When talking during a contraction gets hard, you may be moving to active labor. During active labor, you should head for the hospital if you are not there already. ? You are in active labor when contractions come every 3 to 4 minutes and last about 60 seconds. Your cervix is opening more rapidly. ? If your water breaks, contractions will come faster and stronger. ? During transition, your cervix is stretching, and contractions are coming more rapidly. ? You may want to push, but your cervix might not be ready. Your doctor will tell you when to push. · The second stage starts when your cervix is completely opened and you are ready to push. ? Contractions are very strong to push the baby down the birth canal.  ? You will feel the urge to push. You may feel like you need to have a bowel movement. ? You may be coached to push with contractions. These contractions will be very strong, but you will not have them as often. You can get a little rest between contractions. ? You may be emotional and irritable. You may not be aware of what is going on around you.  ? One last push, and your baby is born. · The third stage is when a few more contractions push out the placenta. This may take 30 minutes or less. · The fourth stage is the welcome recovery. You may feel overwhelmed with emotions and exhausted but alert. This is a good time to start breastfeeding. Where can you learn more?   Go to [email or web address] and sign in to your [employer] account. Enter S121 in the [facility] box to learn more about \"Weeks 34 to 36 of Your Pregnancy: Care Instructions. \"     If you do not have an account, please click on the \"Sign Up Now\" link. Current as of: October 8, 2020               Content Version: 12.9  © 2006-2021 [employer]. Care instructions adapted under license by [facility] ([facility]). If you have questions about a medical condition or this instruction, always ask your healthcare professional. [place] [address] any warranty or liability for your use of this information.

## 2021-07-12 NOTE — PROGRESS NOTES
Instructions  Overview     By now, your baby and your belly have grown quite large. It's almost time to give birth! Your baby's lungs are almost ready to breathe air. The skull bones are firm enough to protect your baby's head, but soft enough to move down through the birth canal.  You may be feeling excited and happy at times--but also anxious or scared. You might wonder how you'll know if you're in labor or what to expect during labor. Try to be open and flexible in your expectations of the birth. Because each birth is different, there's no way to know exactly what childbirth will be like for you. Talk to your doctor or midwife about any concerns you have. If you haven't already had the Tdap shot during this pregnancy, talk to your doctor about getting it. It will help protect your  against pertussis infection. In the 36th week, most women have a test for group B streptococcus (GBS). GBS is a common bacteria that can live in the vagina and rectum. It can make your baby sick after birth. If you test positive, you will get antibiotics during labor. The medicine will help keep your baby from getting the bacteria. Follow-up care is a key part of your treatment and safety. Be sure to make and go to all appointments, and call your doctor if you are having problems. It's also a good idea to know your test results and keep a list of the medicines you take. How can you care for yourself at home? Learn about pain relief choices  · Pain is different for every woman. Talk with your doctor about your feelings about pain. · You can choose from several types of pain relief. These include medicine or breathing techniques, as well as comfort measures. You can use more than one option. · If you choose to have pain medicine during labor, talk to your doctor about your options. Some medicines lower anxiety and help with some of the pain. Others make your lower body numb so that you won't feel pain.   · Be sure to tell your doctor about your pain medicine choice before you start labor or very early in your labor. You may be able to change your mind as labor progresses. · Rarely, a woman is put to sleep by medicine given through a mask or an IV. Labor and delivery  · The first stage of labor has three parts: early, active, and transition. ? Most women have early labor at home. You can stay busy or rest, eat light snacks, drink clear fluids, and start counting contractions. ? When talking during a contraction gets hard, you may be moving to active labor. During active labor, you should head for the hospital if you are not there already. ? You are in active labor when contractions come every 3 to 4 minutes and last about 60 seconds. Your cervix is opening more rapidly. ? If your water breaks, contractions will come faster and stronger. ? During transition, your cervix is stretching, and contractions are coming more rapidly. ? You may want to push, but your cervix might not be ready. Your doctor will tell you when to push. · The second stage starts when your cervix is completely opened and you are ready to push. ? Contractions are very strong to push the baby down the birth canal.  ? You will feel the urge to push. You may feel like you need to have a bowel movement. ? You may be coached to push with contractions. These contractions will be very strong, but you will not have them as often. You can get a little rest between contractions. ? You may be emotional and irritable. You may not be aware of what is going on around you.  ? One last push, and your baby is born. · The third stage is when a few more contractions push out the placenta. This may take 30 minutes or less. · The fourth stage is the welcome recovery. You may feel overwhelmed with emotions and exhausted but alert. This is a good time to start breastfeeding. Where can you learn more? Go to [email or web address] and sign in to your MyChart account. Enter Z499 in the [facility] box to learn more about \"Weeks 34 to 36 of Your Pregnancy: Care Instructions. \"     If you do not have an account, please click on the \"Sign Up Now\" link. Current as of: October 8, 2020               Content Version: 12.9  © 2609-7008 [employer]. Care instructions adapted under license by [facility] ([facility]). If you have questions about a medical condition or this instruction, always ask your healthcare professional. [address] any warranty or liability for your use of this information.                    Zoë Spear, KIMBERLY - KARIN,7/12/2021 1:29 PM

## 2021-07-15 ENCOUNTER — ROUTINE PRENATAL (OUTPATIENT)
Dept: OBGYN | Age: 26
End: 2021-07-15
Payer: COMMERCIAL

## 2021-07-15 VITALS — SYSTOLIC BLOOD PRESSURE: 122 MMHG | DIASTOLIC BLOOD PRESSURE: 68 MMHG | BODY MASS INDEX: 37.25 KG/M2 | WEIGHT: 245 LBS

## 2021-07-15 DIAGNOSIS — Z82.79 FAMILY HISTORY OF MARFAN SYNDROME: ICD-10-CM

## 2021-07-15 DIAGNOSIS — Z3A.35 35 WEEKS GESTATION OF PREGNANCY: Primary | ICD-10-CM

## 2021-07-15 PROCEDURE — 0502F SUBSEQUENT PRENATAL CARE: CPT | Performed by: ADVANCED PRACTICE MIDWIFE

## 2021-07-15 NOTE — PROGRESS NOTES
Jay Hartmann is here at 35w3d for:    Chief Complaint   Patient presents with    Routine Prenatal Visit     Patient is being seen after BPP was performed. Feeling well, no complaints. Estimated Due Date: Estimated Date of Delivery: 21    OB History    Para Term  AB Living   1 0 0 0 0 0   SAB TAB Ectopic Molar Multiple Live Births   0 0 0   0        # Outcome Date GA Lbr Aidan/2nd Weight Sex Delivery Anes PTL Lv   1 Current                 Past Medical History:   Diagnosis Date    Allergic rhinitis     Asthma     Concussion     Conjunctivitis     allergic       History reviewed. No pertinent surgical history. Social History     Tobacco Use   Smoking Status Never Smoker   Smokeless Tobacco Never Used        Social History     Substance and Sexual Activity   Alcohol Use Not Currently    Alcohol/week: 0.0 standard drinks    Comment: rarely       No results found for this visit on 07/15/21. HPI: Patient here today for OB visit follow-up biophysical profile. We did review induction plans and processes depending on cervix    Yes PT denies fever, chills, nausea and vomiting       Vitals:  Estimated body mass index is 37.25 kg/m² as calculated from the following:    Height as of 5/10/21: 5' 8\" (1.727 m). Weight as of this encounter: 245 lb (111.1 kg). BP: 122/68  Weight: 245 lb (111.1 kg)  Patient Position: Sitting  Albumin: Negative  Glucose: Negative  Fetal Heart Rate: 128  Movement: Present      Results reviewed today:  BPP-  TIFFANIE- 16.1CM  HR- 128BPM  POSITION- CEPHALIC  PLACENTA- POSTERIOR   ACTIVE FETAL MOVEMENTS     See prenatal vital sign section and fetal assessment section    ASSESSMENT & Plan    Diagnosis Orders   1. 35 weeks gestation of pregnancy     2. Family history of Marfan syndrome               I am having Sandra Stuart maintain her Nebulizer Compressor, Cetirizine HCl (ZYRTEC ALLERGY PO), cromolyn, Loratadine (CLARITIN PO), Prenatal Vit-Fe Fumarate-FA (PRENATAL VITAMIN PO), omeprazole, and aspirin EC. No follow-ups on file. There are no Patient Instructions on file for this visit.           KIMBERLY Delgado CNM,7/15/2021 1:53 PM

## 2021-07-19 ENCOUNTER — ROUTINE PRENATAL (OUTPATIENT)
Dept: OBGYN | Age: 26
End: 2021-07-19
Payer: COMMERCIAL

## 2021-07-19 ENCOUNTER — HOSPITAL ENCOUNTER (OUTPATIENT)
Age: 26
Setting detail: SPECIMEN
Discharge: HOME | End: 2021-07-19
Payer: COMMERCIAL

## 2021-07-19 VITALS — SYSTOLIC BLOOD PRESSURE: 126 MMHG | WEIGHT: 246 LBS | DIASTOLIC BLOOD PRESSURE: 74 MMHG | BODY MASS INDEX: 37.4 KG/M2

## 2021-07-19 DIAGNOSIS — Z3A.36 36 WEEKS GESTATION OF PREGNANCY: Primary | ICD-10-CM

## 2021-07-19 DIAGNOSIS — Z3A.36 36 WEEKS GESTATION OF PREGNANCY: ICD-10-CM

## 2021-07-19 DIAGNOSIS — Z82.79 FAMILY HISTORY OF MARFAN SYNDROME: ICD-10-CM

## 2021-07-19 PROCEDURE — 59025 FETAL NON-STRESS TEST: CPT | Performed by: ADVANCED PRACTICE MIDWIFE

## 2021-07-19 PROCEDURE — 87081 CULTURE SCREEN ONLY: CPT

## 2021-07-19 PROCEDURE — 0502F SUBSEQUENT PRENATAL CARE: CPT | Performed by: ADVANCED PRACTICE MIDWIFE

## 2021-07-19 NOTE — PROGRESS NOTES
Fumarate-FA (PRENATAL VITAMIN PO), omeprazole, and aspirin EC. Return for Keep next appt. There are no Patient Instructions on file for this visit.           KIMBERLY Raymundo CNM,7/19/2021 2:10 PM

## 2021-07-22 ENCOUNTER — ROUTINE PRENATAL (OUTPATIENT)
Dept: OBGYN | Age: 26
End: 2021-07-22
Payer: COMMERCIAL

## 2021-07-22 VITALS — SYSTOLIC BLOOD PRESSURE: 130 MMHG | BODY MASS INDEX: 37.25 KG/M2 | DIASTOLIC BLOOD PRESSURE: 82 MMHG | WEIGHT: 245 LBS

## 2021-07-22 DIAGNOSIS — Z82.79 FAMILY HISTORY OF MARFAN SYNDROME: Primary | ICD-10-CM

## 2021-07-22 DIAGNOSIS — Z3A.36 36 WEEKS GESTATION OF PREGNANCY: ICD-10-CM

## 2021-07-22 LAB
CULTURE: NORMAL
Lab: NORMAL
SPECIMEN DESCRIPTION: NORMAL

## 2021-07-22 PROCEDURE — 0502F SUBSEQUENT PRENATAL CARE: CPT | Performed by: ADVANCED PRACTICE MIDWIFE

## 2021-07-22 NOTE — PROGRESS NOTES
Bertis Goodpasture is here at 36w3d for:    Chief Complaint   Patient presents with    Routine Prenatal Visit     Follow up BPP. Estimated Due Date: Estimated Date of Delivery: 21    OB History    Para Term  AB Living   1 0 0 0 0 0   SAB TAB Ectopic Molar Multiple Live Births   0 0 0   0        # Outcome Date GA Lbr Aidan/2nd Weight Sex Delivery Anes PTL Lv   1 Current                 Past Medical History:   Diagnosis Date    Allergic rhinitis     Asthma     Concussion     Conjunctivitis     allergic       No past surgical history on file. Social History     Tobacco Use   Smoking Status Never Smoker   Smokeless Tobacco Never Used        Social History     Substance and Sexual Activity   Alcohol Use Not Currently    Alcohol/week: 0.0 standard drinks    Comment: rarely       No results found for this visit on 21. HPI: here for routine ob visit and fetal surveillance, denies c/o, ROXANNA had heart stents \"had some stress this week\"   PT denies fever, chills, nausea and vomiting       Vitals:  Estimated body mass index is 37.25 kg/m² as calculated from the following:    Height as of 5/10/21: 5' 8\" (1.727 m). Weight as of this encounter: 245 lb (111.1 kg). BP: 130/82  Weight: 245 lb (111.1 kg)  Patient Position: Sitting  Albumin: Negative  Glucose: Negative  Movement: Present  Presentation: Vertex    Abdomen: enlarged, gravid,soft, nontender         Results reviewed today:  Sono:  BPP- 8/   TIFFANIE- 10.6CM   HR- 165BPM   POSITION- CEPHALIC   PLACENTA- POSTERIOR   ACTIVE FETAL MOVEMENTS        No results found for this visit on 21. See prenatal vital sign section and fetal assessment section    ASSESSMENT & Plan    Diagnosis Orders   1. Family history of Marfan syndrome     2. 36 weeks gestation of pregnancy               I am having Harman Stuart maintain her Nebulizer Compressor, Cetirizine HCl (ZYRTEC ALLERGY PO), cromolyn, Loratadine (CLARITIN PO), Prenatal Vit-Fe Fumarate-FA (PRENATAL VITAMIN PO), omeprazole, and aspirin EC. Return keep twice weekly testing. There are no Patient Instructions on file for this visit.           KIMBERLY Aguilar CNM,7/22/2021 2:22 PM

## 2021-07-26 ENCOUNTER — ROUTINE PRENATAL (OUTPATIENT)
Dept: OBGYN | Age: 26
End: 2021-07-26
Payer: COMMERCIAL

## 2021-07-26 VITALS — BODY MASS INDEX: 37.4 KG/M2 | SYSTOLIC BLOOD PRESSURE: 118 MMHG | WEIGHT: 246 LBS | DIASTOLIC BLOOD PRESSURE: 76 MMHG

## 2021-07-26 DIAGNOSIS — Z82.79 FAMILY HISTORY OF MARFAN SYNDROME: Primary | ICD-10-CM

## 2021-07-26 DIAGNOSIS — Z34.93 PRENATAL CARE IN THIRD TRIMESTER: ICD-10-CM

## 2021-07-26 PROCEDURE — 0502F SUBSEQUENT PRENATAL CARE: CPT | Performed by: OBSTETRICS & GYNECOLOGY

## 2021-07-26 PROCEDURE — 59025 FETAL NON-STRESS TEST: CPT | Performed by: OBSTETRICS & GYNECOLOGY

## 2021-07-26 NOTE — PROGRESS NOTES
The patient has good fetal movement no regular contractions.   No other problems or complaints reactive nonstress test

## 2021-07-29 ENCOUNTER — ROUTINE PRENATAL (OUTPATIENT)
Dept: OBGYN | Age: 26
End: 2021-07-29
Payer: COMMERCIAL

## 2021-07-29 VITALS — DIASTOLIC BLOOD PRESSURE: 78 MMHG | WEIGHT: 249 LBS | BODY MASS INDEX: 37.86 KG/M2 | SYSTOLIC BLOOD PRESSURE: 118 MMHG

## 2021-07-29 DIAGNOSIS — Z34.93 PRENATAL CARE IN THIRD TRIMESTER: Primary | ICD-10-CM

## 2021-07-29 PROCEDURE — 0502F SUBSEQUENT PRENATAL CARE: CPT | Performed by: OBSTETRICS & GYNECOLOGY

## 2021-08-02 ENCOUNTER — ROUTINE PRENATAL (OUTPATIENT)
Dept: OBGYN | Age: 26
End: 2021-08-02
Payer: COMMERCIAL

## 2021-08-02 VITALS — WEIGHT: 247 LBS | BODY MASS INDEX: 37.56 KG/M2 | DIASTOLIC BLOOD PRESSURE: 84 MMHG | SYSTOLIC BLOOD PRESSURE: 128 MMHG

## 2021-08-02 DIAGNOSIS — Z3A.38 38 WEEKS GESTATION OF PREGNANCY: ICD-10-CM

## 2021-08-02 DIAGNOSIS — Z82.79 FAMILY HISTORY OF MARFAN SYNDROME: Primary | ICD-10-CM

## 2021-08-02 DIAGNOSIS — Z34.93 PRENATAL CARE IN THIRD TRIMESTER: ICD-10-CM

## 2021-08-02 PROCEDURE — 0502F SUBSEQUENT PRENATAL CARE: CPT | Performed by: ADVANCED PRACTICE MIDWIFE

## 2021-08-02 PROCEDURE — 59025 FETAL NON-STRESS TEST: CPT | Performed by: ADVANCED PRACTICE MIDWIFE

## 2021-08-02 NOTE — PROGRESS NOTES
Donovan Sanches is here at 38w0d for:    Chief Complaint   Patient presents with    Routine Prenatal Visit     Patient is being seen for NST monitoring. She is feeling well. Estimated Due Date: Estimated Date of Delivery: 21    OB History    Para Term  AB Living   1 0 0 0 0 0   SAB TAB Ectopic Molar Multiple Live Births   0 0 0   0        # Outcome Date GA Lbr Aidan/2nd Weight Sex Delivery Anes PTL Lv   1 Current                 Past Medical History:   Diagnosis Date    Allergic rhinitis     Asthma     Concussion     Conjunctivitis     allergic       History reviewed. No pertinent surgical history. Social History     Tobacco Use   Smoking Status Never Smoker   Smokeless Tobacco Never Used        Social History     Substance and Sexual Activity   Alcohol Use Not Currently    Alcohol/week: 0.0 standard drinks    Comment: rarely       No results found for this visit on 21. HPI: Patient doing well today. NST reactive. Patient denies needs or concerns at this time    Yes PT denies fever, chills, nausea and vomiting       Vitals:  Estimated body mass index is 37.56 kg/m² as calculated from the following:    Height as of 5/10/21: 5' 8\" (1.727 m). Weight as of this encounter: 247 lb (112 kg). BP: 128/84  Weight: 247 lb (112 kg)  Patient Position: Sitting  Albumin: Negative  Glucose: Negative  Fundal Height (cm): 39 cm  Fetal Heart Rate: 150  Movement: Present  Presentation: Vertex  Dilation (cm): 1  Effacement (%): 60  Station: -1        Results reviewed today:    NST reactive    See prenatal vital sign section and fetal assessment section    ASSESSMENT & Plan    Diagnosis Orders   1. Family history of Marfan syndrome  UT FETAL NON-STRESS TEST   2. Prenatal care in third trimester  UT FETAL NON-STRESS TEST   3. 38 weeks gestation of pregnancy  UT FETAL NON-STRESS TEST       Induction consent reviewed and signed      I am having Stefano Roberto maintain her Nebulizer

## 2021-08-05 ENCOUNTER — ROUTINE PRENATAL (OUTPATIENT)
Dept: OBGYN | Age: 26
End: 2021-08-05
Payer: COMMERCIAL

## 2021-08-05 VITALS — DIASTOLIC BLOOD PRESSURE: 82 MMHG | WEIGHT: 248 LBS | BODY MASS INDEX: 37.71 KG/M2 | SYSTOLIC BLOOD PRESSURE: 136 MMHG

## 2021-08-05 DIAGNOSIS — Z82.79 FAMILY HISTORY OF MARFAN SYNDROME: ICD-10-CM

## 2021-08-05 DIAGNOSIS — Z34.93 PRENATAL CARE IN THIRD TRIMESTER: Primary | ICD-10-CM

## 2021-08-05 DIAGNOSIS — Z3A.38 38 WEEKS GESTATION OF PREGNANCY: ICD-10-CM

## 2021-08-05 PROCEDURE — 0502F SUBSEQUENT PRENATAL CARE: CPT | Performed by: ADVANCED PRACTICE MIDWIFE

## 2021-08-05 NOTE — PROGRESS NOTES
Matias Sinclair is here at 36w4d for:    Chief Complaint   Patient presents with    Routine Prenatal Visit     F/U in house u/s for BPP, SVE. pt states no issues or concerns        Estimated Due Date: Estimated Date of Delivery: 21    OB History    Para Term  AB Living   1 0 0 0 0 0   SAB TAB Ectopic Molar Multiple Live Births   0 0 0   0        # Outcome Date GA Lbr Aidan/2nd Weight Sex Delivery Anes PTL Lv   1 Current                 Past Medical History:   Diagnosis Date    Allergic rhinitis     Asthma     Concussion     Conjunctivitis     allergic       History reviewed. No pertinent surgical history. Social History     Tobacco Use   Smoking Status Never Smoker   Smokeless Tobacco Never Used        Social History     Substance and Sexual Activity   Alcohol Use Not Currently    Alcohol/week: 0.0 standard drinks    Comment: rarely       No results found for this visit on 21. HPI: Patient doing well today we did review induction consent and verbalized understanding    Yes PT denies fever, chills, nausea and vomiting       Vitals:  Estimated body mass index is 37.71 kg/m² as calculated from the following:    Height as of 5/10/21: 5' 8\" (1.727 m). Weight as of this encounter: 248 lb (112.5 kg). BP: 136/82  Weight: 248 lb (112.5 kg)  Patient Position: Sitting  Albumin: Negative  Glucose: Negative  Fundal Height (cm): 40 cm  Fetal Heart Rate: 142  Movement: Present  Presentation: Vertex  Dilation (cm): 2  Effacement (%): 60  Station: -1        Results reviewed today:    No results found for this visit on 21. See prenatal vital sign section and fetal assessment section    ASSESSMENT & Plan    Diagnosis Orders   1. Prenatal care in third trimester     2. Family history of Marfan syndrome     3. 38 weeks gestation of pregnancy               I am having Candelaria Stuart maintain her Nebulizer Compressor, Cetirizine HCl (ZYRTEC ALLERGY PO), cromolyn, Loratadine past surgeries. This could increase the chance of a tear in the uterus. ? There is a problem with the placenta. ? The mother has an infection, such as genital herpes, that could be spread to the baby. ? The mother is having twins or more. ? The baby weighs 9 to 10 pounds or more. · Because of the risks of , planned C-sections generally should be done only for medical reasons. And a planned  should be done at 39 weeks or later unless there is a medical reason to do it sooner. Know what to expect after delivery, and plan for the first few weeks at home  · You, your baby, and your partner or  will get identification bands. Only people with matching bands can  the baby from the nursery. · You will learn how to feed, diaper, and bathe your baby. And you will learn how to care for the umbilical cord stump. If your baby will be circumcised, you will also learn how to care for that. · Ask people to wait to visit you until you are at home. And ask them to wash their hands before they touch your baby. · Make sure you have another adult in your home for at least 2 or 3 days after the birth. · During the first 2 weeks, limit when friends and family can visit. · Do not allow visitors who have colds or infections. Make sure all visitors are up to date with their vaccinations. Never let anyone smoke around your baby. · Try to nap when the baby naps. Be aware of postpartum depression  · \"Baby blues\" are common for the first 1 to 2 weeks after birth. You may cry or feel sad or irritable for no reason. · For some women, these feelings last longer and are more intense. This is called postpartum depression. · If your symptoms last for more than a few weeks or you feel very depressed, ask your doctor for help. · Postpartum depression can be treated. Support groups and counseling can help. Sometimes medicine can also help. Where can you learn more?   Go to

## 2021-08-05 NOTE — PATIENT INSTRUCTIONS
Patient Education        Week 45 of Your Pregnancy: Care Instructions  Your Care Instructions     Believe it or not, your baby is almost here. You may have ideas about your baby's personality because of how much he or she moves. Or you may have noticed how he or she responds to sounds, warmth, cold, and light. You may even know what kind of music your baby likes. By now, you have a better idea of what to expect during delivery. You may have talked about your birth preferences with your doctor. But even if you want a vaginal birth, it is a good idea to learn about  births.  birth means that your baby is born through a cut (incision) in your lower belly. It is sometimes the best choice for the health of the baby and the mother. Follow-up care is a key part of your treatment and safety. Be sure to make and go to all appointments, and call your doctor if you are having problems. It's also a good idea to know your test results and keep a list of the medicines you take. How can you care for yourself at home? Learn about  birth  · Most C-sections are unplanned. They are done because of problems that occur during labor. These problems might include:  ? Labor that slows or stops. ? High blood pressure or other problems for the mother. ? Signs of distress in the baby. These signs may include a very fast or slow heart rate. · Although most mothers and babies do well after , it is major surgery. It has more risks than a vaginal delivery. · In some cases, a planned  may be safer than a vaginal delivery. This may be the case if:  ? The mother has a health problem, such as a heart condition. ? The baby isn't in a head-down position for delivery. This is called a breech position. ? The uterus has scars from past surgeries. This could increase the chance of a tear in the uterus. ? There is a problem with the placenta. ?  The mother has an infection, such as genital herpes, that could be spread to the baby. ? The mother is having twins or more. ? The baby weighs 9 to 10 pounds or more. · Because of the risks of , planned C-sections generally should be done only for medical reasons. And a planned  should be done at 39 weeks or later unless there is a medical reason to do it sooner. Know what to expect after delivery, and plan for the first few weeks at home  · You, your baby, and your partner or  will get identification bands. Only people with matching bands can  the baby from the nursery. · You will learn how to feed, diaper, and bathe your baby. And you will learn how to care for the umbilical cord stump. If your baby will be circumcised, you will also learn how to care for that. · Ask people to wait to visit you until you are at home. And ask them to wash their hands before they touch your baby. · Make sure you have another adult in your home for at least 2 or 3 days after the birth. · During the first 2 weeks, limit when friends and family can visit. · Do not allow visitors who have colds or infections. Make sure all visitors are up to date with their vaccinations. Never let anyone smoke around your baby. · Try to nap when the baby naps. Be aware of postpartum depression  · \"Baby blues\" are common for the first 1 to 2 weeks after birth. You may cry or feel sad or irritable for no reason. · For some women, these feelings last longer and are more intense. This is called postpartum depression. · If your symptoms last for more than a few weeks or you feel very depressed, ask your doctor for help. · Postpartum depression can be treated. Support groups and counseling can help. Sometimes medicine can also help. Where can you learn more? Go to https://hamida.[employer]. org and sign in to your "[employer] The Simple[employer] Fast and Elegant Tablet [employer] App" account. Enter B044 in the [employer] box to learn more about \"Week 38 of Your Pregnancy: Care Instructions. \"     If you do not have an account, please click on the \"Sign Up Now\" link. Current as of: October 8, 2020               Content Version: 12.9  © 2006-2021 [employer]wise, Inc[employer]. Care instructions adapted under license by [facility] ([facility]). If you have questions about a medical condition or this instruction, always ask your healthcare professional. [address] any warranty or liability for your use of this information.

## 2021-08-09 ENCOUNTER — ANESTHESIA (OUTPATIENT)
Dept: LABOR AND DELIVERY | Age: 26
End: 2021-08-09
Payer: COMMERCIAL

## 2021-08-09 ENCOUNTER — APPOINTMENT (OUTPATIENT)
Dept: LABOR AND DELIVERY | Age: 26
End: 2021-08-09
Payer: COMMERCIAL

## 2021-08-09 ENCOUNTER — HOSPITAL ENCOUNTER (INPATIENT)
Age: 26
LOS: 2 days | Discharge: HOME | End: 2021-08-11
Attending: ADVANCED PRACTICE MIDWIFE | Admitting: ADVANCED PRACTICE MIDWIFE
Payer: COMMERCIAL

## 2021-08-09 ENCOUNTER — ANESTHESIA EVENT (OUTPATIENT)
Dept: LABOR AND DELIVERY | Age: 26
End: 2021-08-09
Payer: COMMERCIAL

## 2021-08-09 LAB
ABSOLUTE EOS #: 0.18 K/UL (ref 0–0.44)
ABSOLUTE IMMATURE GRANULOCYTE: 0.13 K/UL (ref 0–0.3)
ABSOLUTE LYMPH #: 2.08 K/UL (ref 1.1–3.7)
ABSOLUTE MONO #: 0.94 K/UL (ref 0.1–1.2)
AMPHETAMINE SCREEN URINE: NEGATIVE
BARBITURATE SCREEN URINE: NEGATIVE
BASOPHILS # BLD: 0 % (ref 0–2)
BASOPHILS ABSOLUTE: 0.04 K/UL (ref 0–0.2)
BENZODIAZEPINE SCREEN, URINE: NEGATIVE
BUPRENORPHINE URINE: NEGATIVE
CANNABINOID SCREEN URINE: NEGATIVE
COCAINE METABOLITE, URINE: NEGATIVE
DIFFERENTIAL TYPE: ABNORMAL
EOSINOPHILS RELATIVE PERCENT: 1 % (ref 1–4)
HCT VFR BLD CALC: 36.1 % (ref 36.3–47.1)
HEMOGLOBIN: 11.5 G/DL (ref 11.9–15.1)
IMMATURE GRANULOCYTES: 1 %
LYMPHOCYTES # BLD: 15 % (ref 24–43)
MCH RBC QN AUTO: 27 PG (ref 25.2–33.5)
MCHC RBC AUTO-ENTMCNC: 31.9 G/DL (ref 28.4–34.8)
MCV RBC AUTO: 84.7 FL (ref 82.6–102.9)
MDMA URINE: NORMAL
METHADONE SCREEN, URINE: NEGATIVE
METHAMPHETAMINE, URINE: NEGATIVE
MONOCYTES # BLD: 7 % (ref 3–12)
NRBC AUTOMATED: 0 PER 100 WBC
OPIATES, URINE: NEGATIVE
OXYCODONE SCREEN URINE: NEGATIVE
PDW BLD-RTO: 13.4 % (ref 11.8–14.4)
PHENCYCLIDINE, URINE: NEGATIVE
PLATELET # BLD: 239 K/UL (ref 138–453)
PLATELET ESTIMATE: ABNORMAL
PMV BLD AUTO: 11.5 FL (ref 8.1–13.5)
PROPOXYPHENE, URINE: NEGATIVE
RBC # BLD: 4.26 M/UL (ref 3.95–5.11)
RBC # BLD: ABNORMAL 10*6/UL
SEG NEUTROPHILS: 76 % (ref 36–65)
SEGMENTED NEUTROPHILS ABSOLUTE COUNT: 10.29 K/UL (ref 1.5–8.1)
TEST INFORMATION: NORMAL
TRICYCLIC ANTIDEPRESSANTS, UR: NEGATIVE
WBC # BLD: 13.7 K/UL (ref 3.5–11.3)
WBC # BLD: ABNORMAL 10*3/UL

## 2021-08-09 PROCEDURE — 88307 TISSUE EXAM BY PATHOLOGIST: CPT

## 2021-08-09 PROCEDURE — 6360000002 HC RX W HCPCS: Performed by: NURSE ANESTHETIST, CERTIFIED REGISTERED

## 2021-08-09 PROCEDURE — 2580000003 HC RX 258: Performed by: ADVANCED PRACTICE MIDWIFE

## 2021-08-09 PROCEDURE — 0KQM0ZZ REPAIR PERINEUM MUSCLE, OPEN APPROACH: ICD-10-PCS | Performed by: OBSTETRICS & GYNECOLOGY

## 2021-08-09 PROCEDURE — 59400 OBSTETRICAL CARE: CPT | Performed by: ADVANCED PRACTICE MIDWIFE

## 2021-08-09 PROCEDURE — 6370000000 HC RX 637 (ALT 250 FOR IP): Performed by: ADVANCED PRACTICE MIDWIFE

## 2021-08-09 PROCEDURE — 6360000002 HC RX W HCPCS: Performed by: ADVANCED PRACTICE MIDWIFE

## 2021-08-09 PROCEDURE — 80306 DRUG TEST PRSMV INSTRMNT: CPT

## 2021-08-09 PROCEDURE — 3E033VJ INTRODUCTION OF OTHER HORMONE INTO PERIPHERAL VEIN, PERCUTANEOUS APPROACH: ICD-10-PCS | Performed by: OBSTETRICS & GYNECOLOGY

## 2021-08-09 PROCEDURE — 2500000003 HC RX 250 WO HCPCS: Performed by: NURSE ANESTHETIST, CERTIFIED REGISTERED

## 2021-08-09 PROCEDURE — 85025 COMPLETE CBC W/AUTO DIFF WBC: CPT

## 2021-08-09 PROCEDURE — 1220000000 HC SEMI PRIVATE OB R&B

## 2021-08-09 PROCEDURE — 7200000001 HC VAGINAL DELIVERY

## 2021-08-09 PROCEDURE — 36415 COLL VENOUS BLD VENIPUNCTURE: CPT

## 2021-08-09 PROCEDURE — 3700000025 EPIDURAL BLOCK: Performed by: NURSE ANESTHETIST, CERTIFIED REGISTERED

## 2021-08-09 PROCEDURE — 10907ZC DRAINAGE OF AMNIOTIC FLUID, THERAPEUTIC FROM PRODUCTS OF CONCEPTION, VIA NATURAL OR ARTIFICIAL OPENING: ICD-10-PCS | Performed by: OBSTETRICS & GYNECOLOGY

## 2021-08-09 RX ORDER — LIDOCAINE HYDROCHLORIDE 10 MG/ML
30 INJECTION, SOLUTION EPIDURAL; INFILTRATION; INTRACAUDAL; PERINEURAL PRN
Status: DISCONTINUED | OUTPATIENT
Start: 2021-08-09 | End: 2021-08-09

## 2021-08-09 RX ORDER — ACETAMINOPHEN 325 MG/1
650 TABLET ORAL EVERY 4 HOURS PRN
Status: DISCONTINUED | OUTPATIENT
Start: 2021-08-09 | End: 2021-08-09

## 2021-08-09 RX ORDER — ROPIVACAINE HYDROCHLORIDE 2 MG/ML
INJECTION, SOLUTION EPIDURAL; INFILTRATION; PERINEURAL CONTINUOUS PRN
Status: DISCONTINUED | OUTPATIENT
Start: 2021-08-09 | End: 2021-08-09 | Stop reason: SDUPTHER

## 2021-08-09 RX ORDER — SODIUM CHLORIDE 9 MG/ML
25 INJECTION, SOLUTION INTRAVENOUS PRN
Status: DISCONTINUED | OUTPATIENT
Start: 2021-08-09 | End: 2021-08-09

## 2021-08-09 RX ORDER — NALBUPHINE HCL 10 MG/ML
10 AMPUL (ML) INJECTION
Status: DISCONTINUED | OUTPATIENT
Start: 2021-08-09 | End: 2021-08-09

## 2021-08-09 RX ORDER — SEVOFLURANE 250 ML/250ML
1 LIQUID RESPIRATORY (INHALATION) CONTINUOUS PRN
Status: DISCONTINUED | OUTPATIENT
Start: 2021-08-09 | End: 2021-08-09

## 2021-08-09 RX ORDER — SODIUM CHLORIDE 9 MG/ML
25 INJECTION, SOLUTION INTRAVENOUS PRN
Status: DISCONTINUED | OUTPATIENT
Start: 2021-08-09 | End: 2021-08-11 | Stop reason: HOSPADM

## 2021-08-09 RX ORDER — METHYLERGONOVINE MALEATE 0.2 MG/ML
200 INJECTION INTRAVENOUS PRN
Status: DISCONTINUED | OUTPATIENT
Start: 2021-08-09 | End: 2021-08-09

## 2021-08-09 RX ORDER — SODIUM CHLORIDE, SODIUM LACTATE, POTASSIUM CHLORIDE, CALCIUM CHLORIDE 600; 310; 30; 20 MG/100ML; MG/100ML; MG/100ML; MG/100ML
500 INJECTION, SOLUTION INTRAVENOUS PRN
Status: DISCONTINUED | OUTPATIENT
Start: 2021-08-09 | End: 2021-08-09

## 2021-08-09 RX ORDER — ACETAMINOPHEN 325 MG/1
650 TABLET ORAL EVERY 4 HOURS PRN
Status: DISCONTINUED | OUTPATIENT
Start: 2021-08-09 | End: 2021-08-11 | Stop reason: HOSPADM

## 2021-08-09 RX ORDER — ROPIVACAINE HYDROCHLORIDE 2 MG/ML
12 INJECTION, SOLUTION EPIDURAL; INFILTRATION; PERINEURAL CONTINUOUS
Status: DISCONTINUED | OUTPATIENT
Start: 2021-08-09 | End: 2021-08-09

## 2021-08-09 RX ORDER — SODIUM CHLORIDE 0.9 % (FLUSH) 0.9 %
10 SYRINGE (ML) INJECTION EVERY 12 HOURS SCHEDULED
Status: DISCONTINUED | OUTPATIENT
Start: 2021-08-09 | End: 2021-08-11 | Stop reason: HOSPADM

## 2021-08-09 RX ORDER — LIDOCAINE HYDROCHLORIDE 20 MG/ML
INJECTION, SOLUTION EPIDURAL; INFILTRATION; INTRACAUDAL; PERINEURAL PRN
Status: DISCONTINUED | OUTPATIENT
Start: 2021-08-09 | End: 2021-08-09 | Stop reason: SDUPTHER

## 2021-08-09 RX ORDER — MODIFIED LANOLIN
OINTMENT (GRAM) TOPICAL PRN
Status: DISCONTINUED | OUTPATIENT
Start: 2021-08-09 | End: 2021-08-11 | Stop reason: HOSPADM

## 2021-08-09 RX ORDER — SODIUM CHLORIDE, SODIUM LACTATE, POTASSIUM CHLORIDE, CALCIUM CHLORIDE 600; 310; 30; 20 MG/100ML; MG/100ML; MG/100ML; MG/100ML
300 INJECTION, SOLUTION INTRAVENOUS ONCE
Status: DISCONTINUED | OUTPATIENT
Start: 2021-08-09 | End: 2021-08-09

## 2021-08-09 RX ORDER — CARBOPROST TROMETHAMINE 250 UG/ML
250 INJECTION, SOLUTION INTRAMUSCULAR PRN
Status: DISCONTINUED | OUTPATIENT
Start: 2021-08-09 | End: 2021-08-09

## 2021-08-09 RX ORDER — SODIUM CHLORIDE, SODIUM LACTATE, POTASSIUM CHLORIDE, CALCIUM CHLORIDE 600; 310; 30; 20 MG/100ML; MG/100ML; MG/100ML; MG/100ML
INJECTION, SOLUTION INTRAVENOUS CONTINUOUS
Status: DISCONTINUED | OUTPATIENT
Start: 2021-08-09 | End: 2021-08-09

## 2021-08-09 RX ORDER — SODIUM CHLORIDE 0.9 % (FLUSH) 0.9 %
5-40 SYRINGE (ML) INJECTION PRN
Status: DISCONTINUED | OUTPATIENT
Start: 2021-08-09 | End: 2021-08-09

## 2021-08-09 RX ORDER — MISOPROSTOL 100 UG/1
900 TABLET ORAL PRN
Status: DISCONTINUED | OUTPATIENT
Start: 2021-08-09 | End: 2021-08-09

## 2021-08-09 RX ORDER — IBUPROFEN 800 MG/1
800 TABLET ORAL EVERY 8 HOURS
Status: DISCONTINUED | OUTPATIENT
Start: 2021-08-09 | End: 2021-08-11 | Stop reason: HOSPADM

## 2021-08-09 RX ORDER — ONDANSETRON 2 MG/ML
4 INJECTION INTRAMUSCULAR; INTRAVENOUS EVERY 6 HOURS PRN
Status: DISCONTINUED | OUTPATIENT
Start: 2021-08-09 | End: 2021-08-09

## 2021-08-09 RX ORDER — SODIUM CHLORIDE 0.9 % (FLUSH) 0.9 %
5-40 SYRINGE (ML) INJECTION EVERY 12 HOURS SCHEDULED
Status: DISCONTINUED | OUTPATIENT
Start: 2021-08-09 | End: 2021-08-09

## 2021-08-09 RX ORDER — SODIUM CHLORIDE 0.9 % (FLUSH) 0.9 %
10 SYRINGE (ML) INJECTION PRN
Status: DISCONTINUED | OUTPATIENT
Start: 2021-08-09 | End: 2021-08-11 | Stop reason: HOSPADM

## 2021-08-09 RX ORDER — SODIUM CHLORIDE, SODIUM LACTATE, POTASSIUM CHLORIDE, CALCIUM CHLORIDE 600; 310; 30; 20 MG/100ML; MG/100ML; MG/100ML; MG/100ML
1000 INJECTION, SOLUTION INTRAVENOUS PRN
Status: DISCONTINUED | OUTPATIENT
Start: 2021-08-09 | End: 2021-08-09

## 2021-08-09 RX ORDER — EPHEDRINE SULFATE/0.9% NACL/PF 50 MG/5 ML
5 SYRINGE (ML) INTRAVENOUS EVERY 5 MIN PRN
Status: DISCONTINUED | OUTPATIENT
Start: 2021-08-09 | End: 2021-08-09

## 2021-08-09 RX ORDER — DOCUSATE SODIUM 100 MG/1
100 CAPSULE, LIQUID FILLED ORAL 2 TIMES DAILY PRN
Status: DISCONTINUED | OUTPATIENT
Start: 2021-08-09 | End: 2021-08-11 | Stop reason: HOSPADM

## 2021-08-09 RX ADMIN — SODIUM CHLORIDE, POTASSIUM CHLORIDE, SODIUM LACTATE AND CALCIUM CHLORIDE 300 ML: 600; 310; 30; 20 INJECTION, SOLUTION INTRAVENOUS at 19:06

## 2021-08-09 RX ADMIN — NALBUPHINE HYDROCHLORIDE 10 MG: 10 INJECTION, SOLUTION INTRAMUSCULAR; INTRAVENOUS; SUBCUTANEOUS at 13:26

## 2021-08-09 RX ADMIN — ROPIVACAINE HYDROCHLORIDE 12 ML/HR: 2 INJECTION, SOLUTION EPIDURAL; INFILTRATION at 15:55

## 2021-08-09 RX ADMIN — LIDOCAINE HYDROCHLORIDE 100 MG: 20 INJECTION, SOLUTION EPIDURAL; INFILTRATION; INTRACAUDAL; PERINEURAL at 15:53

## 2021-08-09 RX ADMIN — SODIUM CHLORIDE, POTASSIUM CHLORIDE, SODIUM LACTATE AND CALCIUM CHLORIDE: 600; 310; 30; 20 INJECTION, SOLUTION INTRAVENOUS at 13:29

## 2021-08-09 RX ADMIN — Medication 40 EACH: at 22:44

## 2021-08-09 RX ADMIN — Medication 1 MILLI-UNITS/MIN: at 06:57

## 2021-08-09 RX ADMIN — IBUPROFEN 800 MG: 800 TABLET, FILM COATED ORAL at 23:13

## 2021-08-09 RX ADMIN — ONDANSETRON 4 MG: 2 INJECTION INTRAMUSCULAR; INTRAVENOUS at 13:13

## 2021-08-09 RX ADMIN — SODIUM CHLORIDE, POTASSIUM CHLORIDE, SODIUM LACTATE AND CALCIUM CHLORIDE: 600; 310; 30; 20 INJECTION, SOLUTION INTRAVENOUS at 06:56

## 2021-08-09 RX ADMIN — BENZOCAINE AND LEVOMENTHOL: 200; 5 SPRAY TOPICAL at 22:44

## 2021-08-09 ASSESSMENT — PAIN SCALES - GENERAL
PAINLEVEL_OUTOF10: 5
PAINLEVEL_OUTOF10: 0

## 2021-08-09 NOTE — PROGRESS NOTES
Department of Obstetrics and Gynecology   Progress Note      SUBJECTIVE:  Pt resting with epidural but has the shakes    OBJECTIVE:      Vitals:    08/09/21 1647 08/09/21 1652 08/09/21 1655 08/09/21 1657   BP:   (!) 106/51    Pulse:   76    Resp:   16    Temp:       SpO2: 100% 100%  100%       Fetal heart rate:       Baseline Heart Rate:  140        Accelerations:  present       Long Term Variability:  moderate       Decelerations:  early         Contraction frequency: 2-3 minutes    Membranes:  Leaking clear fluid    Cervix:         Dilation:  4 cm         Effacement:  90         Station:  -1         Position:  mid             ASSESSMENT & PLAN:    continue care  Place nichole catheter  Continue frequent position changes

## 2021-08-09 NOTE — PROGRESS NOTES
Timeout for epidural with this RN and Eliseo Anthony CRNA at bedside. FOB remains in room for epidural with mask on, sits on chair directly in front of patient, supportive to pt.

## 2021-08-09 NOTE — ANESTHESIA PROCEDURE NOTES
Epidural Block    Patient location during procedure: OB  Start time: 8/9/2021 3:40 PM  End time: 8/9/2021 3:55 PM  Reason for block: labor epidural  Staffing  Performed: resident/CRNA   Resident/CRNA: KIMBERLY Riggs CRNA  Preanesthetic Checklist  Completed: patient identified, IV checked, site marked, risks and benefits discussed, monitors and equipment checked, pre-op evaluation, timeout performed, anesthesia consent given, oxygen available and patient being monitored  Epidural  Patient position: sitting  Prep: ChloraPrep and site prepped and draped  Patient monitoring: continuous pulse ox and frequent blood pressure checks  Approach: midline  Location: lumbar (1-5) (L4-L5)  Injection technique: LYNETTE saline  Guidance: anatomy guided.   Provider prep: mask and sterile gloves  Needle  Needle type: Tuohy   Needle gauge: 17 G  Needle length: 3.5 in  Needle insertion depth: 8 cm  Catheter type: side hole  Catheter size: 19 G  Catheter at skin depth: 14 cm  Test dose: negative  Kit: Alanis  Lot number: [id number]  Expiration date: 3/1/2022  Assessment  Sensory level: T4  Hemodynamics: stable  Attempts: 1

## 2021-08-09 NOTE — PROGRESS NOTES
Antoni CNM returns RN's call. RN updates her on SVE, pt rates contractions \"3\" at present, is breathing through some of the contractions now. RN updates CNM that pt plans to get an epidural and also that pt stated she does not want to do the IV pain meds but patient does not feel she needs something for pain yet.

## 2021-08-09 NOTE — PLAN OF CARE
Problem: Discharge Planning:  Goal: Discharged to appropriate level of care  Description: Discharged to appropriate level of care  Outcome: Ongoing     Problem: Constipation:  Goal: Bowel elimination is within specified parameters  Description: Bowel elimination is within specified parameters  Outcome: Ongoing     Problem: Fluid Volume - Imbalance:  Goal: Absence of imbalanced fluid volume signs and symptoms  Description: Absence of imbalanced fluid volume signs and symptoms  Outcome: Ongoing  Goal: Absence of postpartum hemorrhage signs and symptoms  Description: Absence of postpartum hemorrhage signs and symptoms  Outcome: Ongoing     Problem: Infection - Risk of, Puerperal Infection:  Goal: Will show no infection signs and symptoms  Description: Will show no infection signs and symptoms  Outcome: Ongoing     Problem: Mood - Altered:  Goal: Mood stable  Description: Mood stable  Outcome: Ongoing     Problem: Pain - Acute:  Goal: Pain level will decrease  Description: Pain level will decrease  Outcome: Ongoing     Problem: Anxiety:  Goal: Level of anxiety will decrease  Description: Level of anxiety will decrease  8/9/2021 1954 by Manon Severin, RN  Outcome: Completed  8/9/2021 0749 by Mariah Huston RN  Outcome: Ongoing     Problem: Breathing Pattern - Ineffective:  Goal: Able to breathe comfortably  Description: Able to breathe comfortably  8/9/2021 1954 by Manon Severin, RN  Outcome: Completed  8/9/2021 0749 by Mariah Huston RN  Outcome: Ongoing     Problem: Fluid Volume - Imbalance:  Goal: Absence of imbalanced fluid volume signs and symptoms  Description: Absence of imbalanced fluid volume signs and symptoms  8/9/2021 1954 by Manon Severin, RN  Outcome: Completed  8/9/2021 0749 by Mariah Huston RN  Outcome: Ongoing  Goal: Absence of intrapartum hemorrhage signs and symptoms  Description: Absence of intrapartum hemorrhage signs and symptoms  8/9/2021 1954 by Manon Severin, RN  Outcome: Completed  2021 0749 by Ramon Webb RN  Outcome: Ongoing     Problem: Infection - Intrapartum Infection:  Goal: Will show no infection signs and symptoms  Description: Will show no infection signs and symptoms  2021 by Ace Reddy RN  Outcome: Completed  2021 by Ramon Webb RN  Outcome: Ongoing     Problem: Labor Process - Prolonged:  Goal: Labor progression, first stage, within specified pattern  Description: Labor progression, first stage, within specified pattern  2021 by Ace Reddy RN  Outcome: Completed  2021 by Ramon Webb RN  Outcome: Ongoing  Goal: Labor progession, second stage, within specified pattern  Description: Labor progession, second stage, within specified pattern  2021 by Ace Reddy RN  Outcome: Completed  2021 by Ramon Webb RN  Outcome: Ongoing  Goal: Uterine contractions within specified parameters  Description: Uterine contractions within specified parameters  2021 by Ace Reddy RN  Outcome: Completed  2021 by Ramon Webb RN  Outcome: Ongoing     Problem:  Screening:  Goal: Ability to make informed decisions regarding treatment has improved  Description: Ability to make informed decisions regarding treatment has improved  2021 by Ace Reddy RN  Outcome: Completed  2021 by Ramon Webb RN  Outcome: Ongoing     Problem: Pain - Acute:  Goal: Pain level will decrease  Description: Pain level will decrease  2021 by Ace Reddy RN  Outcome: Completed  2021 by Ramon Webb RN  Outcome: Ongoing  Goal: Able to cope with pain  Description: Able to cope with pain  2021 by Ace Reddy RN  Outcome: Completed  2021 by Ramon Webb RN  Outcome: Ongoing     Problem: Tissue Perfusion - Uteroplacental, Altered:  Description: [TRUNCATED] For intrapartum patients with recurrent variable decelerations of the fetal heart rate, consider transcervical amnioinfusion. For patients in labor, avoid prophylactic use of continuous maternal oxygen supplementation to prevent nonreassu . .. Goal: Absence of abnormal fetal heart rate pattern  Description: Absence of abnormal fetal heart rate pattern  8/9/2021 1954 by Cleoparta Brown RN  Outcome: Completed  8/9/2021 0749 by Omayra Rock RN  Outcome: Ongoing     Problem: Urinary Retention:  Goal: Experiences of bladder distention will decrease  Description: Experiences of bladder distention will decrease  8/9/2021 1954 by Cleopatra Brown RN  Outcome: Completed  8/9/2021 0749 by Omayra Rock RN  Outcome: Ongoing  Goal: Urinary elimination within specified parameters  Description: Urinary elimination within specified parameters  8/9/2021 1954 by Cleopatra Brown RN  Outcome: Completed  8/9/2021 0749 by Omayra Rock RN  Outcome: Ongoing     Problem: Pain:  Description: Pain management should include both nonpharmacologic and pharmacologic interventions.   Goal: Pain level will decrease  Description: Pain level will decrease  8/9/2021 1954 by Cleopatra Brown RN  Outcome: Completed  8/9/2021 0749 by Omayra Rock RN  Outcome: Ongoing  Goal: Control of acute pain  Description: Control of acute pain  Outcome: Completed  Goal: Control of chronic pain  Description: Control of chronic pain  Outcome: Completed

## 2021-08-09 NOTE — PROGRESS NOTES
RN calls JORGE Traore CNM and updates her on SVE, contraction pattern and pitocin at 21milli-units/min (21mL/hr). Pt requesting pain medicine and told RN that if she's not able to get the epidural yet, she will take nubain.  CNM instructs RN to give the patient nubain, CNM states that she wants the patient to be 4cm before getting epidural.

## 2021-08-09 NOTE — ANESTHESIA PRE PROCEDURE
Department of Anesthesiology  Preprocedure Note       Name:  Gertrude Thomas   Age:  26 y. o.  :  1995                                          MRN:  [id number]         Date:  2021      Surgeon: * No surgeons listed *    Procedure: * No procedures listed *    Medications prior to admission:   Prior to Admission medications    Medication Sig Start Date End Date Taking? Authorizing Provider   omeprazole (PRILOSEC) 20 MG delayed release capsule Take 1 capsule by mouth daily 21  Yes Savannah Skinenr Pool, APRN - CNM   aspirin EC 81 MG EC tablet Take 1 tablet by mouth daily 21  Yes Adrian Sample, APRN - CNM   Prenatal Vit-Fe Fumarate-FA (PRENATAL VITAMIN PO) Take by mouth   Yes Historical Provider, MD   Cetirizine HCl (ZYRTEC ALLERGY PO) Take by mouth daily    Yes Historical Provider, MD   Loratadine (CLARITIN PO) Take by mouth    Historical Provider, MD   cromolyn (OPTICROM) 4 % ophthalmic solution PLACE 1 DROP INTO BOTH EYES 4 TIMES DAILY  Patient not taking: Reported on 5/10/2021 10/14/15   Sagar Dupont MD   Respiratory Therapy Supplies (NEBULIZER COMPRESSOR) KIT by Does not apply route.   Patient not taking: Reported on 5/10/2021 10/21/12   Jabier Watts       Current medications:    Current Facility-Administered Medications   Medication Dose Route Frequency Provider Last Rate Last Admin    oxytocin (PITOCIN) 30 units in 500 mL infusion  1 michael-units/min Intravenous Continuous Adrian Sample, APRN - CNM 21 mL/hr at 21 1315 21 michael-units/min at 21 1315    lactated ringers infusion   Intravenous Continuous Adrian Sample, APRN -  mL/hr at 21 1329 New Bag at 21 1329    lactated ringers infusion 500 mL  500 mL Intravenous PRN Adrian Sample, APRN - CNM        Or    lactated ringers infusion 1,000 mL  1,000 mL Intravenous PRN Adrian Sample, APRN -  mL/hr at 21 1520 Rate Change at 21 1520    sodium chloride flush 0.9 % injection 5-40 mL  5-40 mL Intravenous 2 times per day Hobson Bark, APRN - CNM        sodium chloride flush 0.9 % injection 5-40 mL  5-40 mL Intravenous PRN Hobson Bark, APRN - CNM        0.9 % sodium chloride infusion  25 mL Intravenous PRN Hobson Bark, APRN - CNM        lidocaine PF 1 % injection 30 mL  30 mL Other PRN Hobson Bark, APRN - CNM        nalbuphine (NUBAIN) injection 10 mg  10 mg Intravenous Q2H PRN Hobson Bark, APRN - CNM   10 mg at 08/09/21 1326    nitrous oxide 50% inhalation 1 each  1 each Inhalation Continuous PRN Hobson Bark, APRN - CNM        [facility] injection 4 mg  4 mg Intravenous Q6H PRN Hobson Bark, APRN - CNM   4 mg at 08/09/21 1313    oxytocin (PITOCIN) 30 units in 500 mL infusion   Intravenous PRN Hobson Bark, APRN - CNM        And    oxytocin (PITOCIN) 30 units in 500 mL infusion  166 michael-units/min Intravenous PRN Héctor Bark, APRN - CNM        methylergonovine (METHERGINE) injection 200 mcg  200 mcg Intramuscular PRN Hobson Bark, APRN - CNM        carboprost (HEMABATE) injection 250 mcg  250 mcg Intramuscular PRN Hobson Bark, APRN - CNM        miSOPROStol (CYTOTEC) tablet 900 mcg  900 mcg Rectal PRN Héctor Bark, APRN - CNM        acetaminophen (TYLENOL) tablet 650 mg  650 mg Oral Q4H PRN Hobson Bark, APRN - CNM        witch hazel-glycerin (TUCKS) pad   Topical PRN Hobson Bark, APRN - CNM        benzocaine-menthol (DERMOPLAST) 20-0.5 % spray   Topical PRN Hobson Bark, APRN - CNM           Allergies:  No Known Allergies    Problem List:    Patient Active Problem List   Diagnosis Code    Allergic rhinitis J30.9    Asthma J45.909    Conjunctivitis H10.9    Acute frontal sinusitis J01.10    Encounter for supervision of normal first pregnancy in first trimester Z34.01    Family history of Marfan syndrome Z82.79    34 weeks gestation of pregnancy Z3A.34       Past Medical History:        Diagnosis Date    Allergic rhinitis  Asthma     Concussion     Conjunctivitis     allergic       Past Surgical History:  History reviewed. No pertinent surgical history. Social History:    Social History     Tobacco Use    Smoking status: Never Smoker    Smokeless tobacco: Never Used   Substance Use Topics    Alcohol use: Not Currently     Alcohol/week: 0.0 standard drinks     Comment: rarely                                Counseling given: Not Answered      Vital Signs (Current):   Vitals:    08/09/21 1258 08/09/21 1333 08/09/21 1434 08/09/21 1500   BP: 127/70 126/71 (!) 119/56    Pulse: 77 83 78    Resp: 20  18    Temp:    36.7 °C (98 °F)                                              BP Readings from Last 3 Encounters:   08/09/21 (!) 119/56   08/05/21 136/82   08/02/21 128/84       NPO Status:                                                                                 BMI:   Wt Readings from Last 3 Encounters:   08/05/21 248 lb (112.5 kg)   08/02/21 247 lb (112 kg)   07/29/21 249 lb (112.9 kg)     There is no height or weight on file to calculate BMI.    CBC:   Lab Results   Component Value Date    WBC 13.7 08/09/2021    RBC 4.26 08/09/2021    HGB 11.5 08/09/2021    HCT 36.1 08/09/2021    MCV 84.7 08/09/2021    RDW 13.4 08/09/2021     08/09/2021       CMP:   Lab Results   Component Value Date     11/26/2012    K 4.2 11/26/2012     11/26/2012    CO2 31 11/26/2012    BUN 12 11/26/2012    CREATININE 0.84 11/26/2012    GFRAA NOT REPORTED 11/26/2012    LABGLOM  11/26/2012     Pediatric GFR requires additional information. Refer to [facility] website for    GLUCOSE 129 05/18/2021    GLUCOSE 124 11/26/2012    CALCIUM 9.8 11/26/2012       POC Tests: No results for input(s): POCGLU, POCNA, POCK, POCCL, POCBUN, POCHEMO, POCHCT in the last 72 hours.     Coags: No results found for: PROTIME, INR, APTT    HCG (If Applicable):   Lab Results   Component Value Date    PREGTESTUR Positive 01/04/2021        ABGs: No results found for: PHART, PO2ART, WVH2GIX, MLH2BMF, BEART, I4JJTLWA     Type & Screen (If Applicable):  No results found for: LABABO, LABRH    Drug/Infectious Status (If Applicable):  Lab Results   Component Value Date    HEPCAB NONREACTIVE 01/04/2021       COVID-19 Screening (If Applicable): No results found for: COVID19        Anesthesia Evaluation  Patient summary reviewed and Nursing notes reviewed no history of anesthetic complications (first anesthetic.):   Airway: Mallampati: III  TM distance: >3 FB   Neck ROM: full  Mouth opening: > = 3 FB Dental: normal exam         Pulmonary:normal exam    (+) asthma:                            Cardiovascular:Negative CV ROS                      Neuro/Psych:   Negative Neuro/Psych ROS              GI/Hepatic/Renal:   (+) GERD: well controlled,           Endo/Other: Negative Endo/Other ROS                    Abdominal:             Vascular: negative vascular ROS. Other Findings:           Anesthesia Plan      epidural     ASA 2             Anesthetic plan and risks discussed with patient. Plan discussed with CRNA.                   KIMBERLY Rivas - CRNA   8/9/2021

## 2021-08-09 NOTE — H&P
Department of Obstetrics and Gynecology   Obstetrics History and Physical  H&P Admission Inpatient  Note        CHIEF COMPLAINT:    Chief Complaint   Patient presents with    Scheduled Induction        HISTORY OF PRESENT ILLNESS:      The patient is a 26 y.o. female at 39w0d. OB History        1    Para   0    Term   0       0    AB   0    Living   0       SAB   0    TAB   0    Ectopic   0    Molar        Multiple   0    Live Births                Patient presents with a chief complaint as above and is being admitted for induction and family history of marfan's syndrome    Estimated Due Date: Estimated Date of Delivery: 21    PRENATAL CARE:    Complicated by: none    PAST OB HISTORY:  OB History        1    Para   0    Term   0       0    AB   0    Living   0       SAB   0    TAB   0    Ectopic   0    Molar        Multiple   0    Live Births                    Past Medical History:        Diagnosis Date    Allergic rhinitis     Asthma     Concussion     Conjunctivitis     allergic     Past Surgical History:    History reviewed. No pertinent surgical history. Allergies:  Patient has no known allergies.     Social History:    Social History     Socioeconomic History    Marital status:      Spouse name: Not on file    Number of children: Not on file    Years of education: Not on file    Highest education level: Not on file   Occupational History    Not on file   Tobacco Use    Smoking status: Never Smoker    Smokeless tobacco: Never Used   Vaping Use    Vaping Use: Never used   Substance and Sexual Activity    Alcohol use: Not Currently     Alcohol/week: 0.0 standard drinks     Comment: rarely    Drug use: No    Sexual activity: Yes     Partners: Male   Other Topics Concern    Not on file   Social History Narrative    Not on file     Social Determinants of Health     Financial Resource Strain:     Difficulty of Paying Living Expenses:    Food Insecurity:     Worried About [facility] in the Last Year:    [address] in the Last Year:    Transportation Needs:     Lack of Transportation (Medical):  Lack of Transportation (Non-Medical):    Physical Activity:     Days of Exercise per Week:     Minutes of Exercise per Session:    Stress:     Feeling of Stress :    Social Connections:     Frequency of Communication with Friends and Family:     Frequency of Social Gatherings with Friends and Family:     Attends Zoroastrianism Services:     Active Member of Clubs or Organizations:     Attends Club or Organization Meetings:     Marital Status:    Intimate Partner Violence:     Fear of Current or Ex-Partner:     Emotionally Abused:     Physically Abused:     Sexually Abused:      Family History:       Problem Relation Age of Onset    Cancer Maternal Grandmother         intestinal    Cancer Mother         NH lymphoma    Asthma Mother     Diabetes Father         oral medication and diet control    High Cholesterol Father     Hypertension Father     Other Sister         Clotting disorder    Other Brother         Clotting disorder    Other Maternal Grandfather         COVID    COPD Maternal Grandfather     Heart Attack Maternal Grandfather     Breast Cancer Paternal Grandmother      Medications Prior to Admission:  Medications Prior to Admission: omeprazole (PRILOSEC) 20 MG delayed release capsule, Take 1 capsule by mouth daily  aspirin EC 81 MG EC tablet, Take 1 tablet by mouth daily  Prenatal Vit-Fe Fumarate-FA (PRENATAL VITAMIN PO), Take by mouth  Cetirizine HCl (ZYRTEC ALLERGY PO), Take by mouth daily   Loratadine (CLARITIN PO), Take by mouth  cromolyn (OPTICROM) 4 % ophthalmic solution, PLACE 1 DROP INTO BOTH EYES 4 TIMES DAILY (Patient not taking: Reported on 5/10/2021)  Respiratory Therapy Supplies (NEBULIZER COMPRESSOR) KIT, by Does not apply route.  (Patient not taking: Reported on 5/10/2021)    REVIEW OF SYSTEMS:    CONSTITUTIONAL: negative  RESPIRATORY:  negative  CARDIOVASCULAR:  negative  GASTROINTESTINAL:  negative  ALLERGIC/IMMUNOLOGIC:  negative  NEUROLOGICAL:  negative  BEHAVIOR/PSYCH:  negative    PHYSICAL EXAM:  Vitals:    08/09/21 0554 08/09/21 0706   BP: 128/82 138/84   Pulse: 103 96   Resp: 16    Temp: 98.5 °F (36.9 °C)      General appearance:  awake, alert, cooperative, no apparent distress, and appears stated age  Neurologic:  Awake, alert, oriented to name, place and time. Lungs:  No increased work of breathing, good air exchange  Abdomen:  Soft, non tender, gravid, consistent with her gestational age, EFW by Leopald's manouever was 8-2   Fetal heart rate:  Reassuring. Pelvis:  Adequate pelvis  Cervix: 2 cm 60 soft -1  Contraction frequency:  irregular    Membranes:  Ruptured clear fluid    Labs:  Blood Type: A POSITIVE    Rubella:    Rubella Antibody, IGG   Date Value Ref Range Status   01/04/2021 138.0 IU/mL Final     Comment:                 REFERENCE RANGE:  <5.0       NON-REACTIVE (non-immune)  5.0 TO 9.9 EQUIVOCAL  >=10.0     REACTIVE     (immune)             T. Pallidium, IGG:    T. pallidum, IgG   Date Value Ref Range Status   01/04/2021 NONREACTIVE NONREACTIVE Final     Comment:           T. pallidum antibodies are not detected. There is no serological evidence of infection with T. pallidum (early primary syphilis   cannot be excluded). Retest in 2-4 weeks if syphilis is clinically suspect.              Sickle Cell Screen: No results found for: SICKLE  Hepatitis B Surface Antigen:   Hepatitis B Surface Ag   Date Value Ref Range Status   01/04/2021 NONREACTIVE NONREACTIVE Final     HIV:  No results found for: Elsy Hayes           Results for orders placed or performed during the hospital encounter of 08/09/21   CBC auto differential   Result Value Ref Range    WBC 13.7 (H) 3.5 - 11.3 k/uL    RBC 4.26 3.95 - 5.11 m/uL    Hemoglobin 11.5 (L) 11.9 - 15.1 g/dL    Hematocrit 36.1 (L) 36.3 - 47.1 %    MCV 84.7 82.6 - 102.9 fL    MCH 27.0 25.2 - 33.5 pg    MCHC 31.9 28.4 - 34.8 g/dL    RDW 13.4 11.8 - 14.4 %    Platelets 204 955 - 406 k/uL    MPV 11.5 8.1 - 13.5 fL    NRBC Automated 0.0 0.0 per 100 WBC    Differential Type NOT REPORTED     Seg Neutrophils 76 (H) 36 - 65 %    Lymphocytes 15 (L) 24 - 43 %    Monocytes 7 3 - 12 %    Eosinophils % 1 1 - 4 %    Basophils 0 0 - 2 %    Immature Granulocytes 1 (H) 0 %    Segs Absolute 10.29 (H) 1.50 - 8.10 k/uL    Absolute Lymph # 2.08 1.10 - 3.70 k/uL    Absolute Mono # 0.94 0.10 - 1.20 k/uL    Absolute Eos # 0.18 0.00 - 0.44 k/uL    Basophils Absolute 0.04 0.00 - 0.20 k/uL    Absolute Immature Granulocyte 0.13 0.00 - 0.30 k/uL    WBC Morphology NOT REPORTED     RBC Morphology NOT REPORTED     Platelet Estimate NOT REPORTED        ASSESSMENT AND PLAN:    Estimated length of stay: 2 midnights post vaginal delivery    Active Problems:    Family history of Marfan syndrome   39 week gestation      Labor: Admit, anticipate normal delivery, routine labor orders  Fetus: Reassuring, Cat 1  GBS: No  Other: pitocin, AROM    Reviewed plan with Ela Lebron 7:38 AM

## 2021-08-10 PROCEDURE — 1220000000 HC SEMI PRIVATE OB R&B

## 2021-08-10 PROCEDURE — 99024 POSTOP FOLLOW-UP VISIT: CPT | Performed by: ADVANCED PRACTICE MIDWIFE

## 2021-08-10 PROCEDURE — 6370000000 HC RX 637 (ALT 250 FOR IP): Performed by: ADVANCED PRACTICE MIDWIFE

## 2021-08-10 RX ADMIN — IBUPROFEN 800 MG: 800 TABLET, FILM COATED ORAL at 09:24

## 2021-08-10 RX ADMIN — DOCUSATE SODIUM 100 MG: 100 CAPSULE, LIQUID FILLED ORAL at 20:17

## 2021-08-10 RX ADMIN — IBUPROFEN 800 MG: 800 TABLET, FILM COATED ORAL at 20:17

## 2021-08-10 RX ADMIN — DOCUSATE SODIUM 100 MG: 100 CAPSULE, LIQUID FILLED ORAL at 05:12

## 2021-08-10 RX ADMIN — ACETAMINOPHEN 650 MG: 325 TABLET ORAL at 05:12

## 2021-08-10 ASSESSMENT — PAIN SCALES - GENERAL
PAINLEVEL_OUTOF10: 1
PAINLEVEL_OUTOF10: 1
PAINLEVEL_OUTOF10: 2

## 2021-08-10 NOTE — L&D DELIVERY NOTE
Mother's Information    Labor Events     labor?: No  Rupture type: Artificial=AROM  Fluid color: Clear  Fluid odor: None     Mother Delivery Information    Episiotomy: None  Lacerations: 2nd  Repair Suture: None  # of Repair Packets: 1  Surgical or Additional Est. Blood Loss (mL): 0 (View Only): Edit in Flowsheets   Combined Est. Blood Loss (mL): 0        Christina Parnell [[id number]]    Labor Events     labor?: No   steroids?: None  Cervical ripening date/time:     Antibiotics received during labor?: No  Rupture Identifier: Sac 1   Rupture date/time: 21 07:36:00   Rupture type: Artificial=AROM  Fluid color: Clear  Fluid odor: None  Induction: Oxytocin, AROM  Indications for induction: Medical  Augmentation: None  Labor complications: None          Labor Event Times    Labor onset date/time: 21   Dilation complete date/time:  21 EDT   Start pushin2021   Decision time (emergent ):        Anesthesia    Method: Epidural     Assisted Delivery Details    Forceps attempted?: No  Vacuum extractor attempted?: No     Document Additional Attempt       Document Additional Attempt             Shoulder Dystocia    Shoulder dystocia present?: No  Add Second Maneuver  Add Third Maneuver  Add Fourth Maneuver  Add Fifth Maneuver  Add Sixth Maneuver  Add Seventh Maneuver  Add Eighth Maneuver  Add Ninth Maneuver     [place] Presentation    Presentation: Vertex  Position: Left  _: Occiput  _: Posterior     [place] Information    Head delivery date/time: 2021 19:52:00   Changing the 's delivery date/time could affect patient care.:    Delivery date/time:  21   Delivery type: Vaginal, Spontaneous    Details:        Delivery Providers    Delivering clinician: KIMBERLY Moura CNM   Provider Role    Arun Maddox, RN     Mich Evans RN       Cord    Vessels: 3 Vessels  Complications: None  Delayed cord clamping?: Yes  Cord clamped date/time: 2021  Cord blood disposition: Lab  Gases sent?: No  Stem cell collection (by provider): No     Placenta    Date/time: 2021 19:59:00  Removal: Spontaneous  Appearance: Intact  Disposition: Lab     Delivery Resuscitation    Method: Stimulation     Apgars    Living status: Living  Apgars   1 Minute:  5 Minute:  10 Minute 15 Minute 20 Minute   Skin Color: 1  1       Heart Rate: 2  2       Reflex Irritability: 2  2       Muscle Tone: 2  2       Respiratory Effort: 2  2       Total: 9  9               Apgars Assigned Celina Reese RN     Skin to Skin    Skin to skin initiation date/time: 21 19:53:00 EDT   Skin to skin with: Mother  Skin to skin end date/time:         Measurements    Weight: 3737 g Length: 54.6 cm   Head circumference: 35.6 cm       Delivery Information    Episiotomy: None  Perineal lacerations: 2nd Repaired: Yes   Vaginal laceration: No    Cervical laceration: No    Surgical or additional est. blood loss (mL): 0 (View Only): Edit in Flowsheets   Combined est. blood loss (mL): 0  Repair suture: None     Vaginal Delivery Counts    Initial count personnel: Tiffanie Yin LPN  Initial count verified by: MACKENZIE LOZANO RN   4x4:  Needles:  Instruments:  Lap Pads:  Sponges:    Initial counts:         Final counts:         Final count personnel: Abigail Valenzuela RN  Final count verified by: SALUD BARBOSA CNM  Accurate final count?: Yes  Final vaginal sweep completed: vaginal sweep not performed     Other Procedures    Procedures: None     Labor Length    1st stage: 11h 52m  2nd stage: 0h 24m  3rd stage: 0h 07m            Department of Obstetrics and Gynecology  Spontaneous Vaginal Delivery Note          Pre-operative Diagnosis:  Active Problems:    Family history of Marfan syndrome     (normal spontaneous vaginal delivery)    39 weeks gestation of pregnancy  Resolved Problems:    * No resolved hospital problems.  *      Post-operative Diagnosis:  Living  infant(s) and Male    Blood Type and Rh: A POSITIVE      Condition:  infant stable and mother stable remain bonding in delivery room      Details of Procedure: The patient is a 26 y.o. female at 39w0d   OB History        1    Para   0    Term   0       0    AB   0    Living   0       SAB   0    TAB   0    Ectopic   0    Molar        Multiple   0    Live Births                 who was admitted for induction. She received the following interventions: ARBOW and IV Pitocin induction She was known to be GBS negative and did not receive antibiotic prophylaxis. The patient progressed well,did receive an epidural, became complete and started to push. After pushing for 10 minutes  the fetal head was at the perineum,  the rest of the infant delivered atraumatically, placed on mother abdomen. Nucal Cord was not noted. Cord was clamped and cut per father of the baby . The delivery of the placenta was spontaneous. Placenta was inspectedintact. The perineum and vagina were explored and a Second degree laceration.   Suture used for repair:  Vicryl 3.0 rapid

## 2021-08-10 NOTE — ANESTHESIA POSTPROCEDURE EVALUATION
Department of Anesthesiology  Postprocedure Note    Patient: Rebecca Pratt  MRN: [id number]  YOB: 1995  Date of evaluation: 8/10/2021  Time:  11:16 AM     Procedure Summary     Date: 08/09/21 Room / Location:     Anesthesia Start: 8127 Anesthesia Stop: 1952    Procedure: Labor Analgesia Diagnosis:     Scheduled Providers:  Responsible Provider: KIMBERLY Painter CRNA    Anesthesia Type: epidural ASA Status: 2          Anesthesia Type: epidural    Britta Phase I:      Britta Phase II:      Last vitals: Reviewed and per EMR flowsheets. Anesthesia Post Evaluation    Patient location during evaluation: bedside  Patient participation: complete - patient participated  Level of consciousness: awake and alert  Airway patency: patent  Nausea & Vomiting: no nausea and no vomiting  Complications: no  Cardiovascular status: hemodynamically stable  Respiratory status: acceptable and room air  Hydration status: stable  Comments: All motor and sensory has returned post-epidural.  Denies HA.  Very satisfied with epidural analgesia

## 2021-08-10 NOTE — PLAN OF CARE
Problem: Anxiety:  Goal: Level of anxiety will decrease  Description: Level of anxiety will decrease  2021 by Rebecca Sierra RN  Outcome: Completed     Problem: Breathing Pattern - Ineffective:  Goal: Able to breathe comfortably  Description: Able to breathe comfortably  2021 by Rebecca Sierra RN  Outcome: Completed     Problem: Fluid Volume - Imbalance:  Goal: Absence of imbalanced fluid volume signs and symptoms  Description: Absence of imbalanced fluid volume signs and symptoms  2021 by Rebecca Sierra RN  Outcome: Completed  Goal: Absence of intrapartum hemorrhage signs and symptoms  Description: Absence of intrapartum hemorrhage signs and symptoms  2021 by Rebecca Sierra RN  Outcome: Completed     Problem: Infection - Intrapartum Infection:  Goal: Will show no infection signs and symptoms  Description: Will show no infection signs and symptoms  2021 by Rebecca Sierra RN  Outcome: Completed     Problem: Labor Process - Prolonged:  Goal: Labor progression, first stage, within specified pattern  Description: Labor progression, first stage, within specified pattern  2021 by Rebecca Sierra RN  Outcome: Completed  Goal: Labor progession, second stage, within specified pattern  Description: Labor progession, second stage, within specified pattern  2021 by Rebecca Sierra RN  Outcome: Completed  Goal: Uterine contractions within specified parameters  Description: Uterine contractions within specified parameters  2021 by Rebecca Sierra RN  Outcome: Completed     Problem:  Screening:  Goal: Ability to make informed decisions regarding treatment has improved  Description: Ability to make informed decisions regarding treatment has improved  2021 by Rebecca Sierra RN  Outcome: Completed     Problem: Pain - Acute:  Goal: Pain level will decrease  Description: Pain level will decrease  2021 by

## 2021-08-10 NOTE — PROGRESS NOTES
Department of Obstetrics and Gynecology  Labor and Delivery       Post Partum Progress Note             SUBJECTIVE:  Pt doing well today pt denies needs at this time    OBJECTIVE:      Vitals:  /70   Pulse 86   Temp 97.6 °F (36.4 °C)   Resp 20   LMP 11/09/2020 (Approximate)   SpO2 100%   Breastfeeding Unknown   Patient Vitals for the past 24 hrs:   BP Temp Pulse Resp SpO2   08/10/21 0746 125/70 97.6 °F (36.4 °C) 86 20 --   08/10/21 0509 139/87 97.4 °F (36.3 °C) 89 16 --   08/10/21 0020 133/76 98.3 °F (36.8 °C) 104 16 --   08/09/21 2225 122/65 -- 94 -- --   08/09/21 2221 (!) 152/65 98.3 °F (36.8 °C) 100 16 --   08/09/21 2206 123/72 98.2 °F (36.8 °C) 94 16 --   08/09/21 2151 121/67 98.4 °F (36.9 °C) 103 16 --   08/09/21 2136 116/65 98.4 °F (36.9 °C) 96 14 --   08/09/21 2120 123/64 97.9 °F (36.6 °C) 98 16 --   08/09/21 2104 120/60 98.1 °F (36.7 °C) 90 18 --   08/09/21 2051 (!) 117/58 98.1 °F (36.7 °C) 94 16 --   08/09/21 2035 139/63 97.9 °F (36.6 °C) 94 16 --   08/09/21 2021 (!) 146/63 97.9 °F (36.6 °C) 101 20 --   08/09/21 1953 -- -- -- -- 100 %   08/09/21 1950 132/65 -- 94 22 --   08/09/21 1948 -- -- -- -- 98 %   08/09/21 1938 -- -- -- -- 98 %   08/09/21 1933 -- -- -- -- 100 %   08/09/21 1928 -- -- -- -- 100 %   08/09/21 1922 (!) 117/50 98 °F (36.7 °C) 102 22 100 %   08/09/21 1902 -- -- -- -- 100 %   08/09/21 1857 -- -- -- -- 100 %   08/09/21 1852 -- -- -- -- 100 %   08/09/21 1851 (!) 115/58 -- 80 18 --   08/09/21 1847 -- -- -- -- 100 %   08/09/21 1832 -- -- -- -- 99 %   08/09/21 1827 -- -- -- -- 100 %   08/09/21 1822 (!) 120/56 -- 83 18 99 %   08/09/21 1817 -- -- -- -- 100 %   08/09/21 1815 -- -- -- -- 99 %   08/09/21 1812 -- -- -- -- 99 %   08/09/21 1807 -- -- -- -- 99 %   08/09/21 1802 -- -- -- -- 100 %   08/09/21 1758 -- 98.3 °F (36.8 °C) -- -- --   08/09/21 1757 -- -- -- -- 99 %   08/09/21 1752 -- -- -- -- 99 %   08/09/21 1751 (!) 110/54 -- 76 16 --   08/09/21 1747 -- -- -- -- 98 %   08/09/21 1742 -- -- -- -- 99 %   08/09/21 1737 -- -- -- -- 99 %   08/09/21 1732 -- -- -- -- 99 %   08/09/21 1727 -- -- -- -- 99 %   08/09/21 1722 -- -- -- -- 99 %   08/09/21 1720 (!) 93/50 -- 74 -- --   08/09/21 1717 -- -- -- -- 100 %   08/09/21 1712 (!) 104/59 -- 80 18 100 %   08/09/21 1707 -- -- -- -- 100 %   08/09/21 1702 -- -- -- -- 100 %   08/09/21 1657 -- -- -- -- 100 %   08/09/21 1655 (!) 106/51 -- 76 16 --   08/09/21 1652 -- -- -- -- 100 %   08/09/21 1647 -- -- -- -- 100 %   08/09/21 1642 -- -- -- -- 100 %   08/09/21 1640 (!) 106/55 -- 73 18 --   08/09/21 1637 -- -- -- -- 100 %   08/09/21 1632 -- -- -- -- 100 %   08/09/21 1625 (!) 103/51 -- 82 -- --   08/09/21 1622 -- -- -- -- 100 %   08/09/21 1621 (!) 99/46 -- 80 -- --   08/09/21 1617 -- -- -- -- 100 %   08/09/21 1616 (!) 104/52 -- 83 -- --   08/09/21 1612 -- -- -- -- 100 %   08/09/21 1609 (!) 113/52 -- 75 18 --   08/09/21 1607 -- -- -- -- 99 %   08/09/21 1604 120/60 98 °F (36.7 °C) 95 16 --   08/09/21 1602 -- -- -- -- 98 %   08/09/21 1559 132/68 -- 90 18 --   08/09/21 1558 127/68 -- 101 18 --   08/09/21 1557 -- -- -- -- 98 %   08/09/21 1555 134/66 -- 100 18 --   08/09/21 1553 133/64 -- 84 18 --   08/09/21 1551 136/69 -- 88 18 99 %   08/09/21 1549 (!) 145/72 -- 98 18 --   08/09/21 1547 (!) 145/69 -- 113 18 --   08/09/21 1546 137/71 -- 101 18 99 %   08/09/21 1543 139/88 -- 51 18 --   08/09/21 1541 (!) 142/77 -- 95 18 98 %   08/09/21 1530 136/76 -- 96 18 --   08/09/21 1500 -- 98 °F (36.7 °C) -- -- --   08/09/21 1434 (!) 119/56 -- 78 18 --   08/09/21 1333 126/71 -- 83 -- --   08/09/21 1258 127/70 -- 77 20 --   08/09/21 1257 -- 97.6 °F (36.4 °C) -- -- --   08/09/21 1154 (!) 141/87 -- 86 18 --   08/09/21 1116 130/81 98 °F (36.7 °C) 88 18 --   08/09/21 1052 131/72 -- 81 -- --   08/09/21 1007 (!) 140/88 -- 86 18 --   08/09/21 0950 -- 97.9 °F (36.6 °C) -- -- --   08/09/21 0935 115/73 -- 78 18 --   08/09/21 0908 121/79 -- 90 18 --   08/09/21 0837 127/77 -- 83 18 --   08/09/21 0806 133/84 -- 92 18 --   21 0803 127/82 -- 85 18 --   21 0800 -- 97.7 °F (36.5 °C) -- -- --         ABDOMEN: normal shape, position and consistency  GENITAL/URINARY:  External Genitalia:  General appearance; normal, Hair distribution; normal, Lesions absent  Uterus:  Size normal, Contour normal  Breast:normal appearance, no masses or tenderness  Cor: RRR no Murmurs  Pulmonary: clear to auscultation anterior and posterior  Extremities: no Clubbing cyanosis or ecchymosis    DATA:          ASSESSMENT :      Active Problems:    Family history of Marfan syndrome       (normal spontaneous vaginal delivery)      39 weeks gestation of pregnancy          Plan:  Continue care  Discharge 21

## 2021-08-10 NOTE — FLOWSHEET NOTE
Patient attempted to stand at bedside to go to bathroom. Right leg unable to bear weight at this time. Patient states she has mild urge to void, and will eat and then try again to get up to bathroom.

## 2021-08-11 VITALS
OXYGEN SATURATION: 100 % | RESPIRATION RATE: 16 BRPM | SYSTOLIC BLOOD PRESSURE: 137 MMHG | DIASTOLIC BLOOD PRESSURE: 78 MMHG | TEMPERATURE: 97.9 F | HEART RATE: 93 BPM

## 2021-08-11 LAB — SURGICAL PATHOLOGY REPORT: NORMAL

## 2021-08-11 PROCEDURE — 6370000000 HC RX 637 (ALT 250 FOR IP): Performed by: ADVANCED PRACTICE MIDWIFE

## 2021-08-11 PROCEDURE — 90471 IMMUNIZATION ADMIN: CPT | Performed by: ADVANCED PRACTICE MIDWIFE

## 2021-08-11 PROCEDURE — 90715 TDAP VACCINE 7 YRS/> IM: CPT | Performed by: ADVANCED PRACTICE MIDWIFE

## 2021-08-11 PROCEDURE — 6360000002 HC RX W HCPCS: Performed by: ADVANCED PRACTICE MIDWIFE

## 2021-08-11 RX ORDER — IBUPROFEN 800 MG/1
800 TABLET ORAL EVERY 8 HOURS
Qty: 60 TABLET | Refills: 0 | Status: SHIPPED | OUTPATIENT
Start: 2021-08-11 | End: 2021-09-20

## 2021-08-11 RX ORDER — PSEUDOEPHEDRINE HCL 30 MG
100 TABLET ORAL 2 TIMES DAILY PRN
Qty: 60 CAPSULE | Refills: 0 | Status: SHIPPED | OUTPATIENT
Start: 2021-08-11 | End: 2021-08-31

## 2021-08-11 RX ADMIN — DOCUSATE SODIUM 100 MG: 100 CAPSULE, LIQUID FILLED ORAL at 10:53

## 2021-08-11 RX ADMIN — TETANUS TOXOID, REDUCED DIPHTHERIA TOXOID AND ACELLULAR PERTUSSIS VACCINE, ADSORBED 0.5 ML: 5; 2.5; 8; 8; 2.5 SUSPENSION INTRAMUSCULAR at 09:21

## 2021-08-11 RX ADMIN — IBUPROFEN 800 MG: 800 TABLET, FILM COATED ORAL at 10:54

## 2021-08-11 ASSESSMENT — PAIN SCALES - GENERAL: PAINLEVEL_OUTOF10: 1

## 2021-08-11 NOTE — PLAN OF CARE
Problem: Discharge Planning:  Goal: Discharged to appropriate level of care  Description: Discharged to appropriate level of care  8/10/2021 2059 by Kerrie Nielsen RN  Outcome: Ongoing  8/10/2021 0841 by Radha Montoya RN  Outcome: Ongoing     Problem: Constipation:  Goal: Bowel elimination is within specified parameters  Description: Bowel elimination is within specified parameters  8/10/2021 2059 by Kerrie Nielsen RN  Outcome: Ongoing  8/10/2021 0841 by Radha Montoya RN  Outcome: Ongoing     Problem: Fluid Volume - Imbalance:  Goal: Absence of imbalanced fluid volume signs and symptoms  Description: Absence of imbalanced fluid volume signs and symptoms  8/10/2021 2059 by Kerrie Nielsen RN  Outcome: Ongoing  8/10/2021 0841 by Radha Montoya RN  Outcome: Ongoing  Goal: Absence of postpartum hemorrhage signs and symptoms  Description: Absence of postpartum hemorrhage signs and symptoms  8/10/2021 2059 by Kerrie Nieslen RN  Outcome: Ongoing  8/10/2021 0841 by Radha Montoya RN  Outcome: Ongoing     Problem: Infection - Risk of, Puerperal Infection:  Goal: Will show no infection signs and symptoms  Description: Will show no infection signs and symptoms  8/10/2021 2059 by Kerrie Nielsen RN  Outcome: Ongoing  8/10/2021 0841 by Radha Montoya RN  Outcome: Ongoing     Problem: Mood - Altered:  Goal: Mood stable  Description: Mood stable  8/10/2021 2059 by Kerrie Nielsen RN  Outcome: Ongoing  8/10/2021 0841 by Radha Montoya RN  Outcome: Ongoing     Problem: Pain - Acute:  Goal: Pain level will decrease  Description: Pain level will decrease  8/10/2021 2059 by Kerrie Nielsen RN  Outcome: Ongoing  8/10/2021 0841 by Radha Montoya RN  Outcome: Ongoing

## 2021-08-11 NOTE — DISCHARGE SUMMARY
(H) 3.5 - 11.3 k/uL    RBC 4.26 3.95 - 5.11 m/uL    Hemoglobin 11.5 (L) 11.9 - 15.1 g/dL    Hematocrit 36.1 (L) 36.3 - 47.1 %    MCV 84.7 82.6 - 102.9 fL    MCH 27.0 25.2 - 33.5 pg    MCHC 31.9 28.4 - 34.8 g/dL    RDW 13.4 11.8 - 14.4 %    Platelets 170 867 - 886 k/uL    MPV 11.5 8.1 - 13.5 fL    NRBC Automated 0.0 0.0 per 100 WBC    Differential Type NOT REPORTED     Seg Neutrophils 76 (H) 36 - 65 %    Lymphocytes 15 (L) 24 - 43 %    Monocytes 7 3 - 12 %    Eosinophils % 1 1 - 4 %    Basophils 0 0 - 2 %    Immature Granulocytes 1 (H) 0 %    Segs Absolute 10.29 (H) 1.50 - 8.10 k/uL    Absolute Lymph # 2.08 1.10 - 3.70 k/uL    Absolute Mono # 0.94 0.10 - 1.20 k/uL    Absolute Eos # 0.18 0.00 - 0.44 k/uL    Basophils Absolute 0.04 0.00 - 0.20 k/uL    Absolute Immature Granulocyte 0.13 0.00 - 0.30 k/uL    WBC Morphology NOT REPORTED     RBC Morphology NOT REPORTED     Platelet Estimate NOT REPORTED          Postpartum complications: none    Discharge Medication:    Earl Oconnor   Home Medication Instructions [id number]    Printed on:08/11/21 1881   Medication Information                      Cetirizine HCl (ZYRTEC ALLERGY PO)  Take by mouth daily              cromolyn (OPTICROM) 4 % ophthalmic solution  PLACE 1 DROP INTO BOTH EYES 4 TIMES DAILY             docusate sodium (COLACE, DULCOLAX) 100 MG CAPS  Take 100 mg by mouth 2 times daily as needed for Constipation             ibuprofen (ADVIL;MOTRIN) 800 MG tablet  Take 1 tablet by mouth every 8 hours             omeprazole (PRILOSEC) 20 MG delayed release capsule  Take 1 capsule by mouth daily             Prenatal Vit-Fe Fumarate-FA (PRENATAL VITAMIN PO)  Take by mouth                    Admit date: 8/9/2021  5:38 AM    Discharge Date: 8/11/2021    Discharged to: Home Iin stable condition        Plan:   Follow up in 2 week(s) for post partum visit, blood pressure check, breast feeding check and post partum depression check

## 2021-08-11 NOTE — PLAN OF CARE
Problem: Discharge Planning:  Goal: Discharged to appropriate level of care  Description: Discharged to appropriate level of care  8/11/2021 1752 by Effie Yip RN  Outcome: Completed  8/11/2021 0852 by Effie Yip RN  Outcome: Ongoing     Problem: Constipation:  Goal: Bowel elimination is within specified parameters  Description: Bowel elimination is within specified parameters  8/11/2021 1752 by Effie Yip RN  Outcome: Completed  8/11/2021 0852 by Effie Yip RN  Outcome: Ongoing     Problem: Fluid Volume - Imbalance:  Goal: Absence of imbalanced fluid volume signs and symptoms  Description: Absence of imbalanced fluid volume signs and symptoms  8/11/2021 1752 by Effie Yip RN  Outcome: Completed  8/11/2021 0852 by Effie Yip RN  Outcome: Ongoing  Goal: Absence of postpartum hemorrhage signs and symptoms  Description: Absence of postpartum hemorrhage signs and symptoms  8/11/2021 1752 by Effie Yip RN  Outcome: Completed  8/11/2021 0852 by Effie Yip RN  Outcome: Ongoing     Problem: Infection - Risk of, Puerperal Infection:  Goal: Will show no infection signs and symptoms  Description: Will show no infection signs and symptoms  8/11/2021 1752 by Effie Yip RN  Outcome: Completed  8/11/2021 0852 by Effie Yip RN  Outcome: Ongoing     Problem: Mood - Altered:  Goal: Mood stable  Description: Mood stable  8/11/2021 1752 by Effie Yip RN  Outcome: Completed  8/11/2021 0852 by Effie Yip RN  Outcome: Ongoing     Problem: Pain - Acute:  Goal: Pain level will decrease  Description: Pain level will decrease  8/11/2021 1752 by Effie Yip RN  Outcome: Completed  8/11/2021 0852 by Effie Yip RN  Outcome: Ongoing

## 2021-08-11 NOTE — FLOWSHEET NOTE
Patient off unit in stable condition. Departure Mode: with significant other.     Mobility at Departure: ambulatory    Discharged to: private residence    Time of Discharge: 1905

## 2021-08-11 NOTE — PLAN OF CARE
Problem: Discharge Planning:  Goal: Discharged to appropriate level of care  Description: Discharged to appropriate level of care  8/11/2021 0852 by Chante Benedict RN  Outcome: Ongoing  8/10/2021 2059 by Wilder Fox RN  Outcome: Ongoing     Problem: Constipation:  Goal: Bowel elimination is within specified parameters  Description: Bowel elimination is within specified parameters  8/11/2021 0852 by Chante Benedict RN  Outcome: Ongoing  8/10/2021 2059 by Wilder Fox RN  Outcome: Ongoing     Problem: Fluid Volume - Imbalance:  Goal: Absence of imbalanced fluid volume signs and symptoms  Description: Absence of imbalanced fluid volume signs and symptoms  8/11/2021 0852 by Chante Benedict RN  Outcome: Ongoing  8/10/2021 2059 by Wilder Fox RN  Outcome: Ongoing  Goal: Absence of postpartum hemorrhage signs and symptoms  Description: Absence of postpartum hemorrhage signs and symptoms  8/11/2021 0852 by Chante Benedict RN  Outcome: Ongoing  8/10/2021 2059 by Wilder Fox RN  Outcome: Ongoing     Problem: Infection - Risk of, Puerperal Infection:  Goal: Will show no infection signs and symptoms  Description: Will show no infection signs and symptoms  8/11/2021 0852 by Chante Benedict RN  Outcome: Ongoing  8/10/2021 2059 by Wilder Fox RN  Outcome: Ongoing     Problem: Mood - Altered:  Goal: Mood stable  Description: Mood stable  8/11/2021 0852 by Chante Benedict RN  Outcome: Ongoing  8/10/2021 2059 by Wilder Fox RN  Outcome: Ongoing     Problem: Pain - Acute:  Goal: Pain level will decrease  Description: Pain level will decrease  8/11/2021 0852 by Chante Benedict RN  Outcome: Ongoing  8/10/2021 2059 by Wilder Fox RN  Outcome: Ongoing

## 2021-08-11 NOTE — PROGRESS NOTES
Department of Obstetrics and Gynecology  Labor and Delivery       Post Partum Progress Note             SUBJECTIVE:  Pt doing well this am. States breastfeeding is going well. Desires discharge to home today. Denies concerns. Bleeding light. OBJECTIVE:      Vitals:  /78   Pulse 86   Temp 97.4 °F (36.3 °C)   Resp 16   LMP 2020 (Approximate)   SpO2 100%   Breastfeeding Unknown   Patient Vitals for the past 24 hrs:   BP Temp Pulse Resp   21 0115 133/78 97.4 °F (36.3 °C) 86 16   08/10/21 2010 125/75 98.1 °F (36.7 °C) 90 18   08/10/21 1611 138/80 98.4 °F (36.9 °C) 93 --   08/10/21 1202 139/80 98 °F (36.7 °C) 100 --   08/10/21 0746 125/70 97.6 °F (36.4 °C) 86 20         ABDOMEN: Soft, non-tender. FFM U/2.   GENITAL/URINARY:  External Genitalia:  General appearance; normal  Cor: RRR no Murmurs  Pulmonary: clear to auscultation anterior and posterior  Extremities: no Clubbing cyanosis or ecchymosis    DATA:    CBC:   Lab Results   Component Value Date    WBC 13.7 2021    RBC 4.26 2021    HGB 11.5 2021    HCT 36.1 2021    MCV 84.7 2021    MCH 27.0 2021    MCHC 31.9 2021    RDW 13.4 2021     2021    MPV 11.5 2021     ASSESSMENT :      Active Problems:    Family history of Marfan syndrome     (normal spontaneous vaginal delivery)    39 weeks gestation of pregnancy    Plan:  Routine postpartum care. Breastfeeding support. Discharge to home today. Reviewed discharge instructions. Follow up in office in 2 weeks.

## 2021-08-12 ENCOUNTER — APPOINTMENT (OUTPATIENT)
Dept: CT IMAGING | Age: 26
End: 2021-08-12
Payer: COMMERCIAL

## 2021-08-12 ENCOUNTER — HOSPITAL ENCOUNTER (EMERGENCY)
Age: 26
Discharge: HOME | End: 2021-08-12
Attending: EMERGENCY MEDICINE
Payer: COMMERCIAL

## 2021-08-12 ENCOUNTER — APPOINTMENT (OUTPATIENT)
Dept: GENERAL RADIOLOGY | Age: 26
End: 2021-08-12
Payer: COMMERCIAL

## 2021-08-12 VITALS
DIASTOLIC BLOOD PRESSURE: 89 MMHG | SYSTOLIC BLOOD PRESSURE: 133 MMHG | HEART RATE: 96 BPM | OXYGEN SATURATION: 100 % | TEMPERATURE: 97.9 F | RESPIRATION RATE: 16 BRPM

## 2021-08-12 DIAGNOSIS — R07.9 CHEST PAIN, UNSPECIFIED TYPE: Primary | ICD-10-CM

## 2021-08-12 LAB
-: ABNORMAL
ABSOLUTE EOS #: 0.31 K/UL (ref 0–0.44)
ABSOLUTE IMMATURE GRANULOCYTE: 0.11 K/UL (ref 0–0.3)
ABSOLUTE LYMPH #: 1.81 K/UL (ref 1.1–3.7)
ABSOLUTE MONO #: 0.79 K/UL (ref 0.1–1.2)
ALBUMIN SERPL-MCNC: 3.4 G/DL (ref 3.5–5.2)
ALBUMIN/GLOBULIN RATIO: 1 (ref 1–2.5)
ALP BLD-CCNC: 107 U/L (ref 35–104)
ALT SERPL-CCNC: 42 U/L (ref 5–33)
AMORPHOUS: ABNORMAL
ANION GAP SERPL CALCULATED.3IONS-SCNC: 14 MMOL/L (ref 9–17)
AST SERPL-CCNC: 32 U/L
BACTERIA: ABNORMAL
BASOPHILS # BLD: 1 % (ref 0–2)
BASOPHILS ABSOLUTE: 0.05 K/UL (ref 0–0.2)
BILIRUB SERPL-MCNC: <0.1 MG/DL (ref 0.3–1.2)
BILIRUBIN URINE: NEGATIVE
BUN BLDV-MCNC: 8 MG/DL (ref 6–20)
BUN/CREAT BLD: 13 (ref 9–20)
CALCIUM SERPL-MCNC: 8.9 MG/DL (ref 8.6–10.4)
CASTS UA: ABNORMAL /LPF
CHLORIDE BLD-SCNC: 103 MMOL/L (ref 98–107)
CO2: 22 MMOL/L (ref 20–31)
COLOR: YELLOW
COMMENT UA: ABNORMAL
CREAT SERPL-MCNC: 0.64 MG/DL (ref 0.5–0.9)
CREATININE URINE: 55.2 MG/DL (ref 28–217)
CRYSTALS, UA: ABNORMAL /HPF
D-DIMER QUANTITATIVE: 2.03 MG/L FEU (ref 0–0.59)
DIFFERENTIAL TYPE: ABNORMAL
EOSINOPHILS RELATIVE PERCENT: 3 % (ref 1–4)
EPITHELIAL CELLS UA: ABNORMAL /HPF (ref 0–25)
GFR AFRICAN AMERICAN: >60 ML/MIN
GFR NON-AFRICAN AMERICAN: >60 ML/MIN
GFR SERPL CREATININE-BSD FRML MDRD: ABNORMAL ML/MIN/{1.73_M2}
GFR SERPL CREATININE-BSD FRML MDRD: ABNORMAL ML/MIN/{1.73_M2}
GLUCOSE BLD-MCNC: 83 MG/DL (ref 70–99)
GLUCOSE URINE: NEGATIVE
HCT VFR BLD CALC: 33.2 % (ref 36.3–47.1)
HEMOGLOBIN: 10.3 G/DL (ref 11.9–15.1)
IMMATURE GRANULOCYTES: 1 %
KETONES, URINE: NEGATIVE
LEUKOCYTE ESTERASE, URINE: ABNORMAL
LYMPHOCYTES # BLD: 17 % (ref 24–43)
MCH RBC QN AUTO: 26.9 PG (ref 25.2–33.5)
MCHC RBC AUTO-ENTMCNC: 31 G/DL (ref 28.4–34.8)
MCV RBC AUTO: 86.7 FL (ref 82.6–102.9)
MONOCYTES # BLD: 7 % (ref 3–12)
MUCUS: ABNORMAL
NITRITE, URINE: NEGATIVE
NRBC AUTOMATED: 0 PER 100 WBC
OTHER OBSERVATIONS UA: ABNORMAL
PDW BLD-RTO: 13.8 % (ref 11.8–14.4)
PH UA: 7 (ref 5–9)
PLATELET # BLD: 269 K/UL (ref 138–453)
PLATELET ESTIMATE: ABNORMAL
PMV BLD AUTO: 10.4 FL (ref 8.1–13.5)
POTASSIUM SERPL-SCNC: 3.9 MMOL/L (ref 3.7–5.3)
PROTEIN UA: NEGATIVE
RBC # BLD: 3.83 M/UL (ref 3.95–5.11)
RBC # BLD: ABNORMAL 10*6/UL
RBC UA: ABNORMAL /HPF (ref 0–2)
RENAL EPITHELIAL, UA: ABNORMAL /HPF
SEG NEUTROPHILS: 71 % (ref 36–65)
SEGMENTED NEUTROPHILS ABSOLUTE COUNT: 7.79 K/UL (ref 1.5–8.1)
SODIUM BLD-SCNC: 139 MMOL/L (ref 135–144)
SPECIFIC GRAVITY UA: <1.005 (ref 1.01–1.02)
TOTAL PROTEIN, URINE: 8 MG/DL
TOTAL PROTEIN: 6.9 G/DL (ref 6.4–8.3)
TRICHOMONAS: ABNORMAL
TROPONIN INTERP: NORMAL
TROPONIN T: NORMAL NG/ML
TROPONIN, HIGH SENSITIVITY: 8 NG/L (ref 0–14)
TURBIDITY: CLEAR
URINE HGB: ABNORMAL
URINE TOTAL PROTEIN CREATININE RATIO: 0.14 (ref 0–0.2)
UROBILINOGEN, URINE: NORMAL
WBC # BLD: 10.9 K/UL (ref 3.5–11.3)
WBC # BLD: ABNORMAL 10*3/UL
WBC UA: ABNORMAL /HPF (ref 0–5)
YEAST: ABNORMAL

## 2021-08-12 PROCEDURE — 81001 URINALYSIS AUTO W/SCOPE: CPT

## 2021-08-12 PROCEDURE — 93005 ELECTROCARDIOGRAM TRACING: CPT | Performed by: EMERGENCY MEDICINE

## 2021-08-12 PROCEDURE — 84484 ASSAY OF TROPONIN QUANT: CPT

## 2021-08-12 PROCEDURE — 80053 COMPREHEN METABOLIC PANEL: CPT

## 2021-08-12 PROCEDURE — 6360000004 HC RX CONTRAST MEDICATION: Performed by: EMERGENCY MEDICINE

## 2021-08-12 PROCEDURE — 82570 ASSAY OF URINE CREATININE: CPT

## 2021-08-12 PROCEDURE — 99283 EMERGENCY DEPT VISIT LOW MDM: CPT

## 2021-08-12 PROCEDURE — 71045 X-RAY EXAM CHEST 1 VIEW: CPT

## 2021-08-12 PROCEDURE — 85379 FIBRIN DEGRADATION QUANT: CPT

## 2021-08-12 PROCEDURE — 6370000000 HC RX 637 (ALT 250 FOR IP): Performed by: EMERGENCY MEDICINE

## 2021-08-12 PROCEDURE — 71260 CT THORAX DX C+: CPT

## 2021-08-12 PROCEDURE — 84156 ASSAY OF PROTEIN URINE: CPT

## 2021-08-12 PROCEDURE — 85025 COMPLETE CBC W/AUTO DIFF WBC: CPT

## 2021-08-12 PROCEDURE — 36415 COLL VENOUS BLD VENIPUNCTURE: CPT

## 2021-08-12 RX ORDER — KETOROLAC TROMETHAMINE 15 MG/ML
15 INJECTION, SOLUTION INTRAMUSCULAR; INTRAVENOUS ONCE
Status: DISCONTINUED | OUTPATIENT
Start: 2021-08-12 | End: 2021-08-12

## 2021-08-12 RX ORDER — ACETAMINOPHEN 500 MG
1000 TABLET ORAL ONCE
Status: COMPLETED | OUTPATIENT
Start: 2021-08-12 | End: 2021-08-12

## 2021-08-12 RX ORDER — ORPHENADRINE CITRATE 30 MG/ML
60 INJECTION INTRAMUSCULAR; INTRAVENOUS ONCE
Status: DISCONTINUED | OUTPATIENT
Start: 2021-08-12 | End: 2021-08-12

## 2021-08-12 RX ADMIN — ACETAMINOPHEN 1000 MG: 500 TABLET, FILM COATED ORAL at 19:53

## 2021-08-12 RX ADMIN — IOPAMIDOL 75 ML: 755 INJECTION, SOLUTION INTRAVENOUS at 20:16

## 2021-08-12 ASSESSMENT — ENCOUNTER SYMPTOMS
COLOR CHANGE: 0
WHEEZING: 0
VOMITING: 0
SHORTNESS OF BREATH: 0
ABDOMINAL PAIN: 0
RHINORRHEA: 0
NAUSEA: 0
BACK PAIN: 0

## 2021-08-12 ASSESSMENT — PAIN SCALES - GENERAL
PAINLEVEL_OUTOF10: 3
PAINLEVEL_OUTOF10: 1

## 2021-08-12 ASSESSMENT — PAIN DESCRIPTION - PAIN TYPE: TYPE: ACUTE PAIN

## 2021-08-12 ASSESSMENT — PAIN DESCRIPTION - LOCATION: LOCATION: CHEST

## 2021-08-12 NOTE — ED PROVIDER NOTES
[facility] ED  Emergency Department Encounter  EmergencyMedicine Attending     Pt Name:Anay Soliz  MRN: [id number]  [place] 1995  Date of evaluation: 21  PCP:  Monica Penny MD    [address] [place] [address]       Chief Complaint   Patient presents with    Chest Pain     s/p vaginal delivery on . C/O of MSCP onset 1400 today. HISTORY OF PRESENT ILLNESS  (Location/Symptom, Timing/Onset, Context/Setting, Quality, Duration, Modifying Factors, Severity.)      Artemus Schilder is a 26 y.o. female who presents with postpartum day 3 after a vaginal delivery. Patient comes in with chest pain that started today around 2 PM.  Nonexertional, nonpleuritic, mild pain, 1 out of 10, no fevers or chills, no cough congestion runny nose, no nausea vomiting, no history of coronary artery disease, no history of blood clots. No hemoptysis, no immobilization, this was a vaginal delivery, no . No asymmetrical leg swelling or clinical sign of a DVT. No hypertension, hyperlipidemia, diabetes, history of coronary artery disease, no history of smoking or crack or cocaine use. PAST MEDICAL / SURGICAL / SOCIAL / FAMILY HISTORY      has a past medical history of Allergic rhinitis, Asthma, Concussion, and Conjunctivitis.     PSH: None    Social History     Socioeconomic History    Marital status:      Spouse name: Not on file    Number of children: Not on file    Years of education: Not on file    Highest education level: Not on file   Occupational History    Not on file   Tobacco Use    Smoking status: Never Smoker    Smokeless tobacco: Never Used   Vaping Use    Vaping Use: Never used   Substance and Sexual Activity    Alcohol use: Not Currently     Alcohol/week: 0.0 standard drinks     Comment: rarely    Drug use: No    Sexual activity: Yes     Partners: Male   Other Topics Concern    Not on file   Social History Narrative    Not on file     Social Determinants of Health Financial Resource Strain:     Difficulty of Paying Living Expenses:    Food Insecurity:     Worried About Running Out of Food in the Last Year:     [address] in the Last Year:    Transportation Needs:     Lack of Transportation (Medical):  Lack of Transportation (Non-Medical):    Physical Activity:     Days of Exercise per Week:     Minutes of Exercise per Session:    Stress:     Feeling of Stress :    Social Connections:     Frequency of Communication with Friends and Family:     Frequency of Social Gatherings with Friends and Family:     Attends Jewish Services:     Active Member of Clubs or Organizations:     Attends Club or Organization Meetings:     Marital Status:    Intimate Partner Violence:     Fear of Current or Ex-Partner:     Emotionally Abused:     Physically Abused:     Sexually Abused:        Family History   Problem Relation Age of Onset    Cancer Maternal Grandmother         intestinal    Cancer Mother         NH lymphoma    Asthma Mother     Diabetes Father         oral medication and diet control    High Cholesterol Father     Hypertension Father     Other Sister         Clotting disorder    Other Brother         Clotting disorder    Other Maternal Grandfather         COVID    COPD Maternal Grandfather     Heart Attack Maternal Grandfather     Breast Cancer Paternal Grandmother        Allergies:  Patient has no known allergies. Home Medications:  Prior to Admission medications    Medication Sig Start Date End Date Taking?  Authorizing Provider   ibuprofen (ADVIL;MOTRIN) 800 MG tablet Take 1 tablet by mouth every 8 hours 8/11/21   KIMBERLY Posadas CNM   docusate sodium (COLACE, DULCOLAX) 100 MG CAPS Take 100 mg by mouth 2 times daily as needed for Constipation 8/11/21   KIMBERLY Posadas CNM   omeprazole (PRILOSEC) 20 MG delayed release capsule Take 1 capsule by mouth daily 2/9/21   KIMBERLY Barrera CNM   Prenatal Vit-Fe Mariah Riojas Tenderness: There is no abdominal tenderness. There is no guarding or rebound. Musculoskeletal:         General: No swelling or tenderness. Comments: No asymmetrical leg swelling, no clinical sign of a DVT. Skin:     General: Skin is warm. Findings: No erythema. Neurological:      Mental Status: She is alert.          DIFFERENTIAL  DIAGNOSIS     PLAN (LABS / IMAGING / EKG):  Orders Placed This Encounter   Procedures    XR CHEST PORTABLE    CT CHEST PULMONARY EMBOLISM W CONTRAST    CBC auto differential    Comprehensive Metabolic Panel    Troponin    D-Dimer, Quantitative    Urinalysis Reflex to Culture    Protein / Creatinine Ratio, Urine    Microscopic Urinalysis    EKG 12 Lead       MEDICATIONS ORDERED:  Orders Placed This Encounter   Medications    DISCONTD: orphenadrine (NORFLEX) injection 60 mg    DISCONTD: ketorolac (TORADOL) injection 15 mg    acetaminophen (TYLENOL) tablet 1,000 mg    iopamidol (ISOVUE-370) 76 % injection 75 mL       DDX: Musculoskeletal pain versus pneumothorax versus atypical ACS versus PE versus GERD    DIAGNOSTIC RESULTS / EMERGENCY DEPARTMENT COURSE / MDM   :  Results for orders placed or performed during the hospital encounter of 08/12/21   CBC auto differential   Result Value Ref Range    WBC 10.9 3.5 - 11.3 k/uL    RBC 3.83 (L) 3.95 - 5.11 m/uL    Hemoglobin 10.3 (L) 11.9 - 15.1 g/dL    Hematocrit 33.2 (L) 36.3 - 47.1 %    MCV 86.7 82.6 - 102.9 fL    MCH 26.9 25.2 - 33.5 pg    MCHC 31.0 28.4 - 34.8 g/dL    RDW 13.8 11.8 - 14.4 %    Platelets 290 824 - 696 k/uL    MPV 10.4 8.1 - 13.5 fL    NRBC Automated 0.0 0.0 per 100 WBC    Differential Type NOT REPORTED     Seg Neutrophils 71 (H) 36 - 65 %    Lymphocytes 17 (L) 24 - 43 %    Monocytes 7 3 - 12 %    Eosinophils % 3 1 - 4 %    Basophils 1 0 - 2 %    Immature Granulocytes 1 (H) 0 %    Segs Absolute 7.79 1.50 - 8.10 k/uL    Absolute Lymph # 1.81 1.10 - 3.70 k/uL    Absolute Mono # 0.79 0.10 - 1.20 k/uL Absolute Eos # 0.31 0.00 - 0.44 k/uL    Basophils Absolute 0.05 0.00 - 0.20 k/uL    Absolute Immature Granulocyte 0.11 0.00 - 0.30 k/uL    WBC Morphology NOT REPORTED     RBC Morphology NOT REPORTED     Platelet Estimate NOT REPORTED    Comprehensive Metabolic Panel   Result Value Ref Range    Glucose 83 70 - 99 mg/dL    BUN 8 6 - 20 mg/dL    CREATININE 0.64 0.50 - 0.90 mg/dL    Bun/Cre Ratio 13 9 - 20    Calcium 8.9 8.6 - 10.4 mg/dL    Sodium 139 135 - 144 mmol/L    Potassium 3.9 3.7 - 5.3 mmol/L    Chloride 103 98 - 107 mmol/L    CO2 22 20 - 31 mmol/L    Anion Gap 14 9 - 17 mmol/L    Alkaline Phosphatase 107 (H) 35 - 104 U/L    ALT 42 (H) 5 - 33 U/L    AST 32 (H) <32 U/L    Total Bilirubin <0.10 (L) 0.3 - 1.2 mg/dL    Total Protein 6.9 6.4 - 8.3 g/dL    Albumin 3.4 (L) 3.5 - 5.2 g/dL    Albumin/Globulin Ratio 1.0 1.0 - 2.5    GFR Non-African American >60 >60 mL/min    GFR African American >60 >60 mL/min    GFR Comment          GFR Staging         Troponin   Result Value Ref Range    Troponin, High Sensitivity 8 0 - 14 ng/L    Troponin T NOT REPORTED <0.03 ng/mL    Troponin Interp NOT REPORTED    D-Dimer, Quantitative   Result Value Ref Range    D-Dimer, Quant 2.03 (H) 0.00 - 0.59 mg/L FEU   Urinalysis Reflex to Culture    Specimen: Urine, clean catch   Result Value Ref Range    Color, UA YELLOW YELLOW    Turbidity UA CLEAR CLEAR    Glucose, Ur NEGATIVE NEGATIVE    Bilirubin Urine NEGATIVE NEGATIVE    Ketones, Urine NEGATIVE NEGATIVE    Specific Gravity, UA <1.005 (L) 1.010 - 1.020    Urine Hgb 2+ (A) NEGATIVE    pH, UA 7.0 5.0 - 9.0    Protein, UA NEGATIVE NEGATIVE    Urobilinogen, Urine Normal Normal    Nitrite, Urine NEGATIVE NEGATIVE    Leukocyte Esterase, Urine SMALL (A) NEGATIVE    Urinalysis Comments NOT REPORTED    Protein / Creatinine Ratio, Urine   Result Value Ref Range    Total Protein, Urine 8 mg/dL    Creatinine, Ur 55.2 28.0 - 217.0 mg/dL    Urine Total Protein Creatinine Ratio 0.14 0.00 - 0.20 Microscopic Urinalysis   Result Value Ref Range    -          WBC, UA 2 TO 5 0 - 5 /HPF    RBC, UA 5 TO 10 0 - 2 /HPF    Casts UA NOT REPORTED /LPF    Crystals, UA NOT REPORTED None /HPF    Epithelial Cells UA 5 TO 10 0 - 25 /HPF    Renal Epithelial, UA NOT REPORTED 0 /HPF    Bacteria, UA TRACE (A) None    Mucus, UA NOT REPORTED None    Trichomonas, UA NOT REPORTED None    Amorphous, UA NOT REPORTED None    Other Observations UA NOT REPORTED NOT REQ. Yeast, UA NOT REPORTED None   EKG 12 Lead   Result Value Ref Range    Ventricular Rate 90 BPM    Atrial Rate 90 BPM    P-R Interval 144 ms    QRS Duration 78 ms    Q-T Interval 340 ms    QTc Calculation (Bazett) 415 ms    P Axis 33 degrees    R Axis -11 degrees    T Axis 18 degrees       IMPRESSION: 67-year-old female presents to the emergency department secondary to chest pain, no cardiac risk factors, no recent surgery however did have a vaginal delivery. D-dimer was done which was elevated which is why a subsequent chest CT was ordered to look for a blood clot. Patient is fairly comfortable, no tachycardia, normal pulse ox at 100%, EKG is completely normal, no cardiac risk factors, low concern for ACS. Possibly musculoskeletal pain however we will do a CT chest to rule out blood clots. Otherwise no clinical sign of a DVT, no leg pain, no leg swelling, no leg or calf tenderness on exam.  CT chest was done and did not show a pulmonary embolism either. Pain was resolved after Tylenol. Patient has a minimally elevated LFTs, but blood pressures were not in the preeclampsia range, urine protein was negative, urine protein to creatinine ratio was normal as well. I called OB, they are not concerned about preeclampsia and okay to be discharged with outpatient follow-up with obstetrics. Likely musculoskeletal pain for now. No cardiac risk factors either, negative troponin, unlikely to be myocarditis.   Will discharge    RADIOLOGY:    US Fetal Biophysical Profile WO Non Stress Testing    Result Date: 8/9/2021  [place]  on 8/5/2021 11:38 AM BPP- 8/8 TIFFANIE- 11.8 cm  bpm Posterior placenta, vertex EFW- 87%,  8 lb 8 oz      US Fetal Biophysical Profile WO Non Stress Testing    Result Date: 7/29/2021  Bert Ramos on 7/29/2021  1:38 PM BPP- 8/8 TIFFANIE- 8.4CM HR- [id number] POSITION- CEPHALIC PLACENTA- POSTERIOR ACTIVE FETAL MOVEMENTS       US Fetal Biophysical Profile WO Non Stress Testing    Result Date: 7/22/2021  BPP- 8/8 TIFFANIE- 10.6CM HR- [id number] POSITION- CEPHALIC PLACENTA- POSTERIOR ACTIVE FETAL MOVEMENTS     US Fetal Biophysical Profile WO Non Stress Testing    Result Date: 7/16/2021  BPP-8/8 TIFFANIE- 16.1CM HR- [id number] POSITION- CEPHALIC PLACENTA- POSTERIOR ACTIVE FETAL MOVEMENTS     XR CHEST PORTABLE    Result Date: 8/12/2021  EXAMINATION: ONE XRAY VIEW OF THE CHEST 8/12/2021 7:20 pm COMPARISON: None. HISTORY: ORDERING SYSTEM PROVIDED HISTORY: Chest pain TECHNOLOGIST PROVIDED HISTORY: Chest pain Initial evaluation for acute chest pain FINDINGS: The cardiomediastinal silhouette is normal in size. The lungs are clear. No pleural effusion or pneumothorax is present. No acute cardiopulmonary process     CT CHEST PULMONARY EMBOLISM W CONTRAST    Result Date: 8/12/2021  EXAMINATION: CTA OF THE CHEST 8/12/2021 8:18 pm TECHNIQUE: CTA of the chest was performed after the administration of intravenous contrast.  Multiplanar reformatted images are provided for review. MIP images are provided for review. Dose modulation, iterative reconstruction, and/or weight based adjustment of the mA/kV was utilized to reduce the radiation dose to as low as reasonably achievable. COMPARISON: Chest x-ray 08/12/2021 HISTORY: ORDERING SYSTEM PROVIDED HISTORY: Chest pain. Elevated D dimer. TECHNOLOGIST PROVIDED HISTORY: Chest pain. Elevated D dimer.  Decision Support Exception - unselect if not a suspected or confirmed emergency medical condition->Emergency Medical Condition (MA) FINDINGS: Pulmonary Arteries: No evidence of central, lobar or segmental pulmonary emboli. Main pulmonary artery is unremarkable caliber Mediastinum: Heart is unremarkable size without pericardial effusion. .  No suspicious adenopathy. Lungs/pleura: The lungs are without acute process. No focal consolidation or pulmonary edema. No evidence of pleural effusion or pneumothorax. Upper Abdomen: Small hiatal hernia. Hypoattenuation liver can be seen with fatty infiltration. Soft Tissues/Bones: Vertebral heights are maintained. No evidence of central to segmental pulmonary emboli. No acute pleuroparenchymal disease. EKG    EKG Interpretation    Interpreted by me    Rhythm: normal sinus   Rate: normal  Axis: normal  Ectopy: none  Conduction: normal  ST Segments: no acute change  T Waves: no acute change  Q Waves: none    Clinical Impression: no acute changes and normal EKG    All EKG's are interpreted by the Emergency Department Physician who either signs or Co-signs this chart in the absence of a cardiologist.    PROCEDURES:  None    CONSULTS:  None    CRITICAL CARE:  None    FINAL IMPRESSION      1.  Chest pain, unspecified type          DISPOSITION / PLAN     DISPOSITION Decision To Discharge 08/12/2021 11:35:58 PM      PATIENT REFERRED TO:  Hemal So MD  [address] [place] [address] [phone]    Call in 1 day        DISCHARGE MEDICATIONS:  Discharge Medication List as of 8/12/2021 11:36 PM          Karuna Phoenix MD  Emergency Medicine Attending    (Please note that portions of thisnote were completed with a voice recognition program.  Efforts were made to edit the dictations but occasionally words are mis-transcribed.)        Karuna Phoenix MD  08/13/21 0139

## 2021-08-13 ENCOUNTER — TELEPHONE (OUTPATIENT)
Dept: FAMILY MEDICINE CLINIC | Age: 26
End: 2021-08-13

## 2021-08-13 LAB
EKG ATRIAL RATE: 90 BPM
EKG P AXIS: 33 DEGREES
EKG P-R INTERVAL: 144 MS
EKG Q-T INTERVAL: 340 MS
EKG QRS DURATION: 78 MS
EKG QTC CALCULATION (BAZETT): 415 MS
EKG R AXIS: -11 DEGREES
EKG T AXIS: 18 DEGREES
EKG VENTRICULAR RATE: 90 BPM

## 2021-08-13 PROCEDURE — 93010 ELECTROCARDIOGRAM REPORT: CPT | Performed by: INTERNAL MEDICINE

## 2021-08-13 NOTE — TELEPHONE ENCOUNTER
[facility]) ED Follow up Call    Reason for ED visit:  CHEST PAIN    8/13/2021     Houston Sinclair , this is JEWEL from Dr. Roberts Asha office, just calling to see how you are doing after your recent ED visit. Did you receive discharge instructions? Yes  Do you understand the discharge instructions? Yes  Did the ED give you any new prescriptions? No:   Were you able to fill your prescriptions? No:       Do you have one of our red, yellow and green  Zone sheets that help you to determine when you should go to the ED? No      Do you need or want to make a follow up appt with your PCP? No    Do you have any further needs in the home, e.g. equipment?   No        FU appts/Provider:    Future Appointments   Date Time Provider Josee Christie   8/31/2021 11:00 AM KIMBERLY Bradford CNM OB[facility]   9/20/2021 10:10 AM KIMBERLY Bradford CNM OB/GYN [facility]

## 2021-08-13 NOTE — ED NOTES
Called Renny MOSER via cell phone, connected call to Dr Anh Pederson.       Earline Fabry  08/12/21 1829

## 2021-08-23 ENCOUNTER — HOSPITAL ENCOUNTER (EMERGENCY)
Age: 26
Discharge: ANOTHER HOSPITAL | End: 2021-08-23
Payer: COMMERCIAL

## 2021-08-23 ENCOUNTER — APPOINTMENT (OUTPATIENT)
Dept: GENERAL RADIOLOGY | Age: 26
End: 2021-08-23
Payer: COMMERCIAL

## 2021-08-23 VITALS
TEMPERATURE: 98.4 F | HEART RATE: 99 BPM | DIASTOLIC BLOOD PRESSURE: 86 MMHG | SYSTOLIC BLOOD PRESSURE: 119 MMHG | RESPIRATION RATE: 16 BRPM | OXYGEN SATURATION: 100 %

## 2021-08-23 DIAGNOSIS — S92.302A MULTIPLE CLOSED FRACTURES OF METATARSAL BONE OF LEFT FOOT, INITIAL ENCOUNTER: Primary | ICD-10-CM

## 2021-08-23 PROCEDURE — 73610 X-RAY EXAM OF ANKLE: CPT

## 2021-08-23 PROCEDURE — 29515 APPLICATION SHORT LEG SPLINT: CPT

## 2021-08-23 PROCEDURE — 99283 EMERGENCY DEPT VISIT LOW MDM: CPT

## 2021-08-23 PROCEDURE — 73630 X-RAY EXAM OF FOOT: CPT

## 2021-08-23 ASSESSMENT — ENCOUNTER SYMPTOMS
EYE DISCHARGE: 0
SHORTNESS OF BREATH: 0
EYE REDNESS: 0
BACK PAIN: 0
NAUSEA: 0
VOMITING: 0
COUGH: 0
BLOOD IN STOOL: 0
SORE THROAT: 0
WHEEZING: 0
RHINORRHEA: 0
ABDOMINAL PAIN: 0
CHEST TIGHTNESS: 0
DIARRHEA: 0
CONSTIPATION: 0

## 2021-08-23 ASSESSMENT — PAIN DESCRIPTION - DESCRIPTORS: DESCRIPTORS: ACHING

## 2021-08-23 ASSESSMENT — PAIN SCALES - GENERAL: PAINLEVEL_OUTOF10: 5

## 2021-08-23 ASSESSMENT — PAIN DESCRIPTION - PAIN TYPE: TYPE: ACUTE PAIN

## 2021-08-23 ASSESSMENT — PAIN DESCRIPTION - LOCATION: LOCATION: FOOT

## 2021-08-23 ASSESSMENT — PAIN DESCRIPTION - ORIENTATION: ORIENTATION: LEFT

## 2021-08-23 NOTE — ED NOTES
Dr Mercedes Quevedo called at office, per Yessica Ronquillo he is not here     Kingsley Babinski  08/23/21 0039

## 2021-08-23 NOTE — ED PROVIDER NOTES
back pain and myalgias. Skin: Negative for pallor and rash. Allergic/Immunologic: Negative for food allergies and immunocompromised state. Neurological: Negative for dizziness, syncope, weakness and light-headedness. Hematological: Negative for adenopathy. Does not bruise/bleed easily. Psychiatric/Behavioral: Negative for behavioral problems and suicidal ideas. The patient is not nervous/anxious. Except as noted above the remainder of the review of systems was reviewed and negative. PAST MEDICAL HISTORY     Past Medical History:   Diagnosis Date    Allergic rhinitis     Asthma     Concussion     Conjunctivitis     allergic         SURGICALHISTORY     No past surgical history on file. CURRENT MEDICATIONS       Previous Medications    CETIRIZINE HCL (ZYRTEC ALLERGY PO)    Take by mouth daily     CROMOLYN (OPTICROM) 4 % OPHTHALMIC SOLUTION    PLACE 1 DROP INTO BOTH EYES 4 TIMES DAILY    DOCUSATE SODIUM (COLACE, DULCOLAX) 100 MG CAPS    Take 100 mg by mouth 2 times daily as needed for Constipation    IBUPROFEN (ADVIL;MOTRIN) 800 MG TABLET    Take 1 tablet by mouth every 8 hours    OMEPRAZOLE (PRILOSEC) 20 MG DELAYED RELEASE CAPSULE    Take 1 capsule by mouth daily    PRENATAL VIT-FE FUMARATE-FA (PRENATAL VITAMIN PO)    Take by mouth         ALLERGIES   Patient has no known allergies.     FAMILY HISTORY       Family History   Problem Relation Age of Onset    Cancer Maternal Grandmother         intestinal    Cancer Mother         NH lymphoma    Asthma Mother     Diabetes Father         oral medication and diet control    High Cholesterol Father     Hypertension Father     Other Sister         Clotting disorder    Other Brother         Clotting disorder    Other Maternal Grandfather         COVID    COPD Maternal Grandfather     Heart Attack Maternal Grandfather     Breast Cancer Paternal Grandmother           SOCIAL HISTORY       Social History     Socioeconomic History    Marital status:      Spouse name: Not on file    Number of children: Not on file    Years of education: Not on file    Highest education level: Not on file   Occupational History    Not on file   Tobacco Use    Smoking status: Never Smoker    Smokeless tobacco: Never Used   Vaping Use    Vaping Use: Never used   Substance and Sexual Activity    Alcohol use: Not Currently     Alcohol/week: 0.0 standard drinks     Comment: rarely    Drug use: No    Sexual activity: Yes     Partners: Male   Other Topics Concern    Not on file   Social History Narrative    Not on file     Social Determinants of Health     Financial Resource Strain:     Difficulty of Paying Living Expenses:    Food Insecurity:     Worried About Running Out of Food in the Last Year:     [address] in the Last Year:    Transportation Needs:     Lack of Transportation (Medical):  Lack of Transportation (Non-Medical):    Physical Activity:     Days of Exercise per Week:     Minutes of Exercise per Session:    Stress:     Feeling of Stress :    Social Connections:     Frequency of Communication with Friends and Family:     Frequency of Social Gatherings with Friends and Family:     Attends [employer] Services:     Active Member of Clubs or Organizations:     Attends Club or Organization Meetings:     Marital Status:    Intimate Partner Violence:     Fear of Current or Ex-Partner:     Emotionally Abused:     Physically Abused:     Sexually Abused:        SCREENINGS             PHYSICAL EXAM    (up to 7 for level 4, 8 or more for level 5)     ED Triage Vitals [08/23/21 1457]   BP Temp Temp Source Pulse Resp SpO2 Height Weight   119/86 98.4 °F (36.9 °C) Tympanic 99 16 100 % -- --       Physical Exam  Vitals and nursing note reviewed. Constitutional:       General: She is not in acute distress. Appearance: Normal appearance. She is well-developed. She is not ill-appearing, toxic-appearing or diaphoretic.    HENT: Head: Normocephalic and atraumatic. Right Ear: External ear normal.      Left Ear: External ear normal.   Eyes:      General: No scleral icterus. Right eye: No discharge. Left eye: No discharge. Conjunctiva/sclera: Conjunctivae normal.   Neck:      Trachea: No tracheal deviation. Cardiovascular:      Rate and Rhythm: Normal rate and regular rhythm. Pulmonary:      Effort: Pulmonary effort is normal. No respiratory distress. Breath sounds: No stridor. Musculoskeletal:         General: Swelling and tenderness present. Normal range of motion. Cervical back: Full passive range of motion without pain, normal range of motion and neck supple. No spinous process tenderness or muscular tenderness. Normal range of motion. Comments: Patient has significant swelling noted to the dorsum of the left foot. She does not have any open wounds. She has intact pulses sensation with cap refill less than 2 seconds. Negative Mercer's test.  She has no tenderness of the medial or lateral malleolus or proximal fibular. Skin:     General: Skin is warm and dry. Capillary Refill: Capillary refill takes less than 2 seconds. Findings: No erythema or rash. Neurological:      General: No focal deficit present. Mental Status: She is alert and oriented to person, place, and time. Cranial Nerves: No cranial nerve deficit. Motor: No abnormal muscle tone. Deep Tendon Reflexes: Reflexes are normal and symmetric.    Psychiatric:         Behavior: Behavior normal.         DIAGNOSTIC RESULTS     EKG: All EKG's are interpreted by the Emergency Department Physician who either signs orCo-signs this chart in the absence of a cardiologist.      RADIOLOGY:   Non-plainfilm images such as CT, Ultrasound and MRI are read by the radiologist. Plain radiographic images are visualized and preliminarily interpreted by the emergency physician with the below findings:      Interpretationper the Radiologist below, if available at the time of this note:    XR FOOT LEFT (MIN 3 VIEWS)   Final Result   Left ankle: No acute fracture in the ankle proper but a 1st metatarsal bone   fractures incompletely included in the field of view. Left foot: The patient has a comminuted intra-articular fracture of the base   of the 1st metatarsal bone with medial angulation of 43 degrees and subtle   lateral subluxation of the base on the 1st cuneiform bone. Also noted is a   comminuted fracture involving the head and neck of the 2nd metatarsal bone   with medial angulation of 25 degrees. There is also a fracture which is   nondisplaced of the 3rd metatarsal head. XR ANKLE LEFT (MIN 3 VIEWS)   Final Result   Left ankle: No acute fracture in the ankle proper but a 1st metatarsal bone   fractures incompletely included in the field of view. Left foot: The patient has a comminuted intra-articular fracture of the base   of the 1st metatarsal bone with medial angulation of 43 degrees and subtle   lateral subluxation of the base on the 1st cuneiform bone. Also noted is a   comminuted fracture involving the head and neck of the 2nd metatarsal bone   with medial angulation of 25 degrees. There is also a fracture which is   nondisplaced of the 3rd metatarsal head. ED BEDSIDE ULTRASOUND:   Performed by ED Physician - none    LABS:  Labs Reviewed - No data to display    All other labs were within normal range or not returned as of this dictation. EMERGENCY DEPARTMENT COURSE and DIFFERENTIAL DIAGNOSIS/MDM:   Vitals:    Vitals:    08/23/21 1457   BP: 119/86   Pulse: 99   Resp: 16   Temp: 98.4 °F (36.9 °C)   TempSrc: Tympanic   SpO2: 100%         MDM  52-year-old female who presents secondary to left foot injury after she slipped while walking down 2 steps. Has significant swelling noted to the dorsum of the foot.   Concern for acute fracture subluxation or other bone abnormality. I called and spoke with Dr. Garfield Deshpande, orthopedic physician on-call at this facility, who asked to see if his colleague was still here at this facility to potentially take this patient. We called and spoke to the office and Dr. Yris Daley has already left for the day. Dr. Melodie Richards is going to look at the images and call me back. Dr. Garfield Deshpande called and stated the patient needs to be transferred to [facility] to be taking care of one of his partners who is a podiatrist because she needs emergent care of this foot. Given that we do have podiatry that sometimes is available at this facility I will call podiatry. ED Course as of Aug 23 1630   Mon Aug 23, 2021   1614 I just called and spoke with podiatrist Dr. Brijesh Rai who is unavailable at this time to take patient due to a dental emergency. Given this we do not otherwise officially have podiatry on-call. I will transfer patient to [facility] where Dr. Garfield Deshpande orthopedic on-call would like the patient to be seen by one of his partners who can take care of the patient. []   0901 I called and spoke with Dr. Dm Rudolph attending at [facility] who is aware of patient's presentation work-up here in the ER. He is aware I spoke with orthopedic on call, Dr. Garfield Deshpande, who recommends that his partner, Dr. Nagi Garrett takes care of the patient. He is aware that they are supposed to call Dr. Nagi Garrett the podiatrist who works with Dr. Garfield Deshpande when the patient arrives to the ER. Sundar Covarrubias Patient is declining our transport and wants to transfer by private car she understands the risk and agrees. She understands this is against my advice. [HH]      ED Course User Index  [HH] Joy Brice PA-C        Procedures    FINAL IMPRESSION      1.  Multiple closed fractures of metatarsal bone of left foot, initial encounter        DISPOSITION/PLAN   DISPOSITION Decision To Transfer 08/23/2021 04:13:22 PM      PATIENT REFERRED TO:  No follow-up provider specified. DISCHARGE MEDICATIONS:  New Prescriptions    No medications on file              Summation      Patient Course:      ED Medications administered this visit:  Medications - No data to display    New Prescriptions from this visit:    New Prescriptions    No medications on file       Follow-up:  No follow-up provider specified. Final Impression:   1.  Multiple closed fractures of metatarsal bone of left foot, initial encounter               (Please note that portions of this note were completed with a voice recognition program.  Efforts were made to edit the dictations but occasionally words are mis-transcribed.)           Fatou Headley PA-C  08/23/21 [place], [place]  08/23/21 3535

## 2021-08-31 ENCOUNTER — TELEMEDICINE (OUTPATIENT)
Dept: OBGYN | Age: 26
End: 2021-08-31

## 2021-08-31 DIAGNOSIS — Z3A.20 20 WEEKS GESTATION OF PREGNANCY: Primary | ICD-10-CM

## 2021-08-31 DIAGNOSIS — Z34.93 PRENATAL CARE IN THIRD TRIMESTER: ICD-10-CM

## 2021-08-31 PROCEDURE — 99024 POSTOP FOLLOW-UP VISIT: CPT | Performed by: ADVANCED PRACTICE MIDWIFE

## 2021-08-31 NOTE — PATIENT INSTRUCTIONS
Patient Education        After Pregnancy: Exercises  Introduction  Here are some examples of exercises that can help after pregnancy. Start each exercise slowly. Ease off the exercise if you start to have pain. Your doctor or physical therapist will tell you when you can start these exercises and which ones will work best for you. How to do the exercises  Tummy tuck   1. Lie on your back, and place two fingers just inside your hip bones so you can feel your lower belly muscles. 2. Take a deep breath in.  3. As you breathe out, pull your belly button in toward your spine, as if you are trying to zip up a tight pair of jeans. You should feel your lower belly muscles pull slightly away from your fingers as the muscles tighten. 4. Hold for about 6 seconds, but do not hold your breath. 5. Repeat 8 to 12 times. 6. Repeat several times a day, and try to hold your lower belly muscles in for longer as you get stronger. 7. Practice doing this exercise while you are standing, such as when you are standing in line, or sitting. Heel slides   1. Lie on the floor with your knees bent, and place two fingers just inside your hip bones so you can feel your lower belly muscles. Your feet should be flat on the floor. 2. Pull your belly button in toward your spine. You should feel your lower belly muscles pull slightly away from your fingers as the muscles tighten. 3. Keep holding your belly button in as you slowly slide one foot along the floor until your leg is out straight. 4. Slowly slide the leg back to your starting position while making sure that you keep holding your belly button in.  5. Do not arch or move your back as you are doing this. Do not hold your breath. 6. Relax and repeat with your other leg. 7. Repeat 8 to 12 times. Knee-to-chest stretch   1. Lie on your back with one knee bent and the other leg straight.   2. Clasp your hands together under your bent knee and bring the knee to your chest. Keep your lower back pressed to the floor. 3. If it hurts your back to keep your opposite leg straight as you stretch, bend that knee too, and keep that foot flat on the floor. 4. Hold for at least 15 to 30 seconds. 5. Relax and lower the knee to the starting position. 6. Repeat with the other leg. 7. Repeat 2 to 4 times with each leg. Neck rotation   1. Sit in a firm chair, or stand up straight. 2. Keeping your chin level, turn your head to the right, and hold for 15 to 30 seconds. 3. Turn your head to the left and hold for 15 to 30 seconds. 4. Repeat 2 to 4 times to each side. Shoulder rolls   1. Sit comfortably with your feet shoulder-width apart. You can also do this exercise while standing. 2. Roll your shoulders up, then back, and then down in a smooth, circular motion. 3. Repeat 2 to 4 times. Midback stretch   If you have knee pain, do not do this exercise. 1. Kneel on the floor, and sit back on your ankles. 2. Lean forward, place your hands on the floor, and stretch your arms out in front of you. Rest your head between your arms. 3. Gently push your chest toward the floor, reaching as far in front of you as possible. 4. Hold for 15 to 30 seconds. 5. Repeat 2 to 4 times. Back stretches   1. Get down on your hands and knees on the floor. 2. Relax your head and allow it to droop. Round your back up toward the ceiling until you feel a nice stretch in your upper, middle, and lower back. Hold this stretch for as long as it feels comfortable, or about 15 to 30 seconds. 3. Return to the starting position with a flat back while you are on your hands and knees. 4. Let your back sway by pressing your stomach toward the floor. Lift your buttocks toward the ceiling. 5. Hold this position for 15 to 30 seconds. 6. Repeat 2 to 4 times. Hamstring stretch (lying down)   1. Lie flat on your back with your legs straight.  If you feel discomfort in your back, place a small towel roll under your lower back.  2. Holding the back of your leg, lift your leg straight up and toward your body until you feel a stretch at the back of your thigh. 3. Hold the stretch for at least 30 seconds. 4. Repeat 2 to 4 times. 5. Switch legs and repeat steps 1 through 4. Calf stretch   1. Stand facing a wall with your hands on the wall at about eye level. Put your leg about a step behind your other leg. 2. Keeping your back leg straight and your back heel on the floor, bend your front knee and gently bring your hip and chest toward the wall until you feel a stretch in the calf of your back leg. 3. Hold the stretch for 15 to 30 seconds. 4. Repeat 2 to 4 times. 5. Repeat steps 1 through 4, but this time keep your back knee bent. 6. Switch legs and repeat steps 1 through 5. Follow-up care is a key part of your treatment and safety. Be sure to make and go to all appointments, and call your doctor if you are having problems. It's also a good idea to know your test results and keep a list of the medicines you take. Where can you learn more? Go to [email or web address] and sign in to your [email or web address] account. Enter J058 in the [facility] box to learn more about \"After Pregnancy: Exercises. \"     If you do not have an account, please click on the \"Sign Up Now\" link. Current as of: October 8, 2020               Content Version: 12.9  © 2973-6775 Healthwise, Incorporated. Care instructions adapted under license by [facility] [place] ([facility]). If you have questions about a medical condition or this instruction, always ask your healthcare professional. [place] [address] any warranty or liability for your use of this information.

## 2021-08-31 NOTE — PROGRESS NOTES
Allie Turner (:  1995) is a 23 y.o. female,Established patient, here for evaluation of the following chief complaint(s): Postpartum Care         ASSESSMENT/PLAN:  1. 20 weeks gestation of pregnancy  2. Prenatal care in third trimester      Return for Keep next appt. SUBJECTIVE/OBJECTIVE:  POSTPARTUM VV    Delivery date 21    Type of delivery:  Spontaneous vaginal delivery    Pt [facility] 2 [facility] patient broke her left foot in 3 places. Patient will be going for surgery tomorrow. Patient will then be nonweightbearing for 5 weeks    How many Hours of sleep do you get a night: 2-3 hours    Do you have a normal appetite: good    Any problems or pain: other than the foot no    Do you feel like you coping well: good    Is baby sleeping:off and on    How is baby eating: breast and bottle    How did first pediatrician visit go: well            Review of Systems   All other systems reviewed and are negative. No flowsheet data found. Physical Exam              Allie Turner, was evaluated through a synchronous (real-time) audio-video encounter. The patient (or guardian if applicable) is aware that this is a billable service. Verbal consent to proceed has been obtained within the past 12 months. The visit was conducted pursuant to the emergency declaration under the [address] [place] [address] authority and the [employer] General Act. Patient identification was verified, and a caregiver was present when appropriate. The patient was located in a state where the provider was credentialed to provide care. An electronic signature was used to authenticate this note.     --KIMBERLY Lang CNM

## 2021-09-20 ENCOUNTER — POSTPARTUM VISIT (OUTPATIENT)
Dept: OBGYN | Age: 26
End: 2021-09-20
Payer: COMMERCIAL

## 2021-09-20 VITALS — HEIGHT: 68 IN | DIASTOLIC BLOOD PRESSURE: 80 MMHG | SYSTOLIC BLOOD PRESSURE: 122 MMHG | BODY MASS INDEX: 37.71 KG/M2

## 2021-09-20 PROCEDURE — 0503F POSTPARTUM CARE VISIT: CPT | Performed by: ADVANCED PRACTICE MIDWIFE

## 2021-09-20 NOTE — PROGRESS NOTES
POSTPARTUM EXAM    Date of service: 2021    Calvin Pichardo  Is a 23 y.o.  female    PT's PCP is: Lenore Landeros MD     : 1995     OB History    Para Term  AB Living   1 1 1 0 0 1   SAB TAB Ectopic Molar Multiple Live Births   0 0 0   0 1      # Outcome Date GA Lbr Aidan/2nd Weight Sex Delivery Anes PTL Lv   1 Term 21 39w0d 11:52 / 00:24 8 lb 3.8 oz (3.737 kg) M Vag-Spont EPI N WALT        Social History     Tobacco Use   Smoking Status Never Smoker   Smokeless Tobacco Never Used         Social History     Substance and Sexual Activity   Alcohol Use Not Currently    Alcohol/week: 0.0 standard drinks    Comment: rarely         Delivery date 21    Type of delivery:  Spontaneous vaginal delivery    Laceration:Yes, Second degree laceration. Suture used for repair:  Vicryl 3.0    Infant gender: male    Infant name: Zara Alcazar    Are you breast or bottle feeding? Breast and formula     Have you been sexually active since delivery? No    Vital Signs: Blood pressure 122/80, height 5' 8\" (1.727 m), last menstrual period 2020, currently breastfeeding. Labs:    Blood Type and Rh: A POSITIVE          Allergies: Patient has no known allergies. Current Outpatient Medications:     cromolyn (OPTICROM) 4 % ophthalmic solution, PLACE 1 DROP INTO BOTH EYES 4 TIMES DAILY (Patient not taking: Reported on 5/10/2021), Disp: 10 mL, Rfl: 1    Last Yearly:      Last pap: 21    Last HPV: never     Chief Complaint   Patient presents with    Postpartum Care     6 week pp care following vaginal delivery on 21. last pap 21. pt states no issues or concerns.  pt is currently breastfeeding and supplementing with formula as well, pt has not been sexually active since delivery, pt would like to discuss cycle control options         How many Hours of sleep do you get a night: about 6 hours     Do you have a normal appetite: yes     Any problems or pain: no    Do you feel like you coping well: yes     Is baby sleeping: yes     How is baby eating: good     How did first pediatrician visit go: good     EPPDS: 4  Postpartum Depression Scale 9/20/2021   I have been able to laugh and see the funny side of things As much as I always could   I have looked forward with enjoyment to things As much as I ever did   I have blamed myself unnecessarily when things went wrong No, never   I have been anxious or worried for no good reason Yes, sometimes   I have felt scared or panicky for no good reason No, not at all   I haven't been able to cope lately No, I have been coping as well as ever   I have been so unhappy that I have had difficulty sleeping Not at all   I have felt sad or miserable Not very often   I have been so unhappy that I have been crying Only occasionally   The thought of harming myself has occurred to me Never   Total 4         No results found for this visit on 09/20/21. Nurse: OLGA    HPI:  PT presents for Post partum exam Follow up in 6 week(s) after delivery. Patient had surgery at the beginning of the month on her left foot. Patient states it is not holding her back and she will get the screws removed in a week or so      Objective  Lymphatic:   no lymphadenopathy  Heent:   negative   Cor: regular rate and rhythm, no murmurs              Pul:clear to auscultation bilaterally- no wheezes, rales or rhonchi, normal air movement, no respiratory distress      GI: Abdomen soft, non-tender. BS normal. No masses,  No organomegaly           Extremities: normal strength, tone, and muscle mass                                 Assessment and Plan          Diagnosis Orders   1. 6 weeks postpartum follow-up               I am having Romeo Fried maintain her cromolyn. Return in about 5 months (around 2/16/2022) for yearly. She was also counseled on her preventative health maintenance recommendations and follow-up.      There are no Patient Instructions on file for this visit.     KIMBERLY Waggoner CNM,9/20/2021 11:16 AM

## 2021-11-12 ENCOUNTER — NURSE ONLY (OUTPATIENT)
Dept: FAMILY MEDICINE CLINIC | Age: 26
End: 2021-11-12
Payer: COMMERCIAL

## 2021-11-12 ENCOUNTER — HOSPITAL ENCOUNTER (OUTPATIENT)
Dept: PHYSICAL THERAPY | Age: 26
Setting detail: THERAPIES SERIES
Discharge: HOME | End: 2021-11-12
Payer: COMMERCIAL

## 2021-11-12 DIAGNOSIS — Z23 NEEDS FLU SHOT: Primary | ICD-10-CM

## 2021-11-12 PROCEDURE — 90674 CCIIV4 VAC NO PRSV 0.5 ML IM: CPT | Performed by: NURSE PRACTITIONER

## 2021-11-12 PROCEDURE — 97161 PT EVAL LOW COMPLEX 20 MIN: CPT

## 2021-11-12 PROCEDURE — 97110 THERAPEUTIC EXERCISES: CPT

## 2021-11-12 PROCEDURE — 90471 IMMUNIZATION ADMIN: CPT | Performed by: NURSE PRACTITIONER

## 2021-11-12 ASSESSMENT — PAIN SCALES - GENERAL: PAINLEVEL_OUTOF10: 2

## 2021-11-12 NOTE — PLAN OF CARE
[facility]           Phone: [phone]             Outpatient Physical Therapy  Fax: [phone]                                           Date: 2021  Patient: Mandie Baez :  CSN #: [id number]   Referring Practitioner:  Jayna Urbina CNP Referral Date:  21       [x] Plan of Care   [] Updated Plan of Care    Dates of Service to Include: 2021 to 22    Diagnosis:  Z47.89 encounter for other orthopedic aftercare, S92.322D displaced fracture of 2nd metatarsal bone L foot, S92.242D displaced fracture of medial cuneiform of L foot    Rehab (Treatment) Diagnosis:  L foot pain             Onset Date:  21    Attendance  Total # of Visits to Date: 1        Assessment  Assessment: Pt is a 23year old female that presents with L foor pain, decreased ankle AROM and reduced strength post fractures with surgery. Pt still limited with heel to to gait pattern and reports swelling after being on her feet for long periods. Pt will benefit from skilled PT in order to address these deficits.       Goals  Short term goals  Time Frame for Short term goals: 2 weeks  Short term goal 1: Pt will be educated on her POC and HEP-met  Short term goal 2: Pt will increase L ankle dorsiflexion to 10 degrees in order to normalize heel to toe pattern  Long term goals  Time Frame for Long term goals : 4 weeks  Long term goal 1: Pt will be safe and independent with her HEP  Long term goal 2: Pt will increase L ankle strength to >/=5/5 in order to tolerate a full work day  Long term goal 3: Pt will ambulate community distances with proper heel to toe gait pattern  Long term goal 4: Pt will report 90% improvement in her symptoms     Prognosis  Prognosis: Good    Treatment Plan   Times per week: 2x/wk  Plan weeks: 4-6 weeks  [x] HP/CP      [x] Electrical Stim   [x] Therapeutic Exercise      [x] Gait Training  [] Aquatics   [x] Ultrasound         [x] Patient Education/HEP   [x] Manual Therapy  [] Traction    [] Neuro-dre        [x] Soft Tissue Mobs            [] Home TENS  [] Iontophoresis    [] Orthotic casting/fitting      [x] Dry Needling             Electronically signed by: Leila Mendoza PT, DPT    Date: 11/12/2021      ______________________________________ Date: 11/12/2021   Physician Signature

## 2021-11-12 NOTE — PROGRESS NOTES
Phone: [phone]                       [facility]          Fax: [phone]                      Outpatient Physical Therapy                                                                      Evaluation  Date: 2021  Patient: Tom Coughlin  :   CSN #: [id number]  Referring Practitioner: Mele Talley CNP    Referral Date : 21     Medical Diagnosis: Z47.89 encounter for other orthopedic aftercare, S92.322D displaced fracture of 2nd metatarsal bone L foot, S92.242D displaced fracture of medial cuneiform of L foot    Treatment Diagnosis: L foot pain  Onset Date: 21  PT Insurance Information: Medical [place]  Total # of Visits Approved: 12   Total # of Visits to Date: 1     Subjective  Subjective: Pt reports she fell down some steps on  and suffered 3 fractures in L foot and underwent surgery on . Pt was on a knee scooter and NWB until 10/13, pt was then able to walk with a boot until 11/3. Pt reports a little pain with walking but states she is still limping. Pt states she is back to full activity but reports pain after being on her feet all day. Additional Pertinent Hx: astham  Pain Screening  Patient Currently in Pain: Yes  Pain Assessment  Pain Assessment: 0-10  Pain Level: 2          Objective     Observation/Palpation  Palpation: thick scar tissue on top of L foot    LLE General AROM: ankle dorsi (10), plantar 45.  inver 35, ever 10      Strength LLE  Comment: ankle dorsi 4/5, plantar 4+/5, ever 4-/5, inver 4/5      Exercises:  Exercise 1: HEP 4-way ankle Tband, ankle AROM, calf/soleus stretch        Functional Outcome Measures      Any of your usual work, housework, or school activities: Moderate Difficulty  Your usual hobbies, recreational, or sporting activities: Moderate Difficulty  Getting into or out of the bath: No Difficulty  Walking between rooms: No Difficulty  Putting on your shoes or socks: No Difficulty  Squatting: A Little Bit of Difficulty  Lifting an object, like a bag of groceries from the floor: No Difficulty  Performing light activities around your home: No Difficulty  Performing heavy activities around your home: A Little Bit of Difficulty  Getting into or out of a car: No Difficulty  Walking 2 blocks: A Little Bit of Difficulty  Walking a mile: Moderate Difficulty  Going up or down 10 stairs (about 1 flight of stairs): A Little Bit of Difficulty  Standing for 1 hour: Moderate Difficulty  Sitting for 1 hour: No Difficulty  Running on even ground : Quite a Bit of Difficulty  Running on uneven ground : Quite a Bit of Difficulty  Making sharp turns while running fast : Quite a Bit of Difficulty  Hopping: Quite a Bit of Difficulty  Rolling over in bed: No Difficulty  LEFS Total Score: 56       Assessment  Assessment: Pt is a 23year old female that presents with L foor pain, decreased ankle AROM and reduced strength post fractures with surgery. Pt still limited with heel to to gait pattern and reports swelling after being on her feet for long periods. Pt will benefit from skilled PT in order to address these deficits. Prognosis: Good        Decision Making: Low Complexity    Patient Education  Patient Education: pt educated on her POC and HEP  Pt verbalized/demonstrated good understanding:     [X] Yes         [] No, pt required further clarification.       Goals  Short term goals  Time Frame for Short term goals: 2 weeks  Short term goal 1: Pt will be educated on her POC and HEP-met  Short term goal 2: Pt will increase L ankle dorsiflexion to 10 degrees in order to normalize heel to toe pattern    Long term goals  Time Frame for Long term goals : 4 weeks  Long term goal 1: Pt will be safe and independent with her HEP  Long term goal 2: Pt will increase L ankle strength to >/=5/5 in order to tolerate a full work day  Long term goal 3: Pt will ambulate community distances with proper heel to toe gait pattern  Long term goal 4: Pt will report 90% improvement in her symptoms      Patient goals : \"to walk without a limp\"        Minutes Tracking:  Time In: 1400  Time Out: [address]  Minutes: 47  Timed Code Treatment Minutes: Dwaine 80, PT, DPT    11/12/2021

## 2021-11-16 ENCOUNTER — HOSPITAL ENCOUNTER (OUTPATIENT)
Dept: PHYSICAL THERAPY | Age: 26
Setting detail: THERAPIES SERIES
Discharge: HOME | End: 2021-11-16
Payer: COMMERCIAL

## 2021-11-16 PROCEDURE — 97140 MANUAL THERAPY 1/> REGIONS: CPT

## 2021-11-16 PROCEDURE — 97110 THERAPEUTIC EXERCISES: CPT

## 2021-11-16 NOTE — PROGRESS NOTES
Phone: [facility]           Fax: [phone]                           Outpatient Physical Therapy                                                                            Daily Note    Patient: Lillian Obregon :   CSN #: [id number]   Referring Practitioner:  Oswald Rahman CNP    Referral Date : 21     Date: 2021    Diagnosis: Z47.89 encounter for other orthopedic aftercare, S92.322D displaced fracture of 2nd metatarsal bone L foot, S92.242D displaced fracture of medial cuneiform of L foot  Treatment Diagnosis: L foot pain    Onset Date: 21  PT Insurance Information: Medical [place]  Total # of Visits Approved: 12 Per Physician Order  Total # of Visits to Date: 2      Pre-Treatment Pain:  0/10  Subjective: Pt declines pain today. States she hasn't taken anything for pain today. She states she noticed her ankle felt more loose and \"able to get more flexion\" through her ankle after her eval.  Reports compliance with HEP. Exercises:  Exercise 1: HEP 4-way ankle Tband, ankle AROM, calf/soleus stretch  Exercise 2: scifit 10 min L1  Exercise 3: AIREX:  tandem stance 3x30\" ea ;  SLS LLE 3x30\" ;  eccentric heel raise x10  Exercise 4: 6 inch FSU/LSU x15  Exercise 5: Gastroc stretch off step 3x20\" ; standing soleus stretch 3x20\"    Manual:  Soft Tissue Mobalization: MFD cupping static placement to plantarflexors to decrease muscular restrictions -- followed by heated thermaprobe x 8 min  Assessment  Assessment: Progressed with ankle stability and proprioceptive exercises without increased pain. Pt reports feeling better once she stretches her calf. MFD techniques used today to decrease muscular tone for improved DF ROM. Will progress.     Activity Tolerance  Activity Tolerance: Patient Tolerated treatment well    Patient Education  Exercise technique and manual rationale  Pt verbalized/demonstrated good understanding:     [x] Yes         [] No,

## 2021-11-17 ENCOUNTER — HOSPITAL ENCOUNTER (OUTPATIENT)
Dept: PHYSICAL THERAPY | Age: 26
Setting detail: THERAPIES SERIES
Discharge: HOME | End: 2021-11-17
Payer: COMMERCIAL

## 2021-11-17 PROCEDURE — G0283 ELEC STIM OTHER THAN WOUND: HCPCS

## 2021-11-17 PROCEDURE — 97110 THERAPEUTIC EXERCISES: CPT

## 2021-11-17 PROCEDURE — 97140 MANUAL THERAPY 1/> REGIONS: CPT

## 2021-11-17 NOTE — PROGRESS NOTES
Phone: [facility]           Fax: [phone]                           Outpatient Physical Therapy                                                                            Daily Note    Patient: Maxim Williamson : [birth year]  CSN #: [id number]   Referring Practitioner:  Carmen Ponce CNP    Referral Date : 21     Date: 2021    Diagnosis: Z47.89 encounter for other orthopedic aftercare, S92.322D displaced fracture of 2nd metatarsal bone L foot, S92.242D displaced fracture of medial cuneiform of L foot  Treatment Diagnosis: L foot pain    Onset Date: 21  PT Insurance Information: Medical [place]  Total # of Visits Approved: 12 Per Physician Order  Total # of Visits to Date: 3  No Show: 0  Canceled Appointment: 0      Pre-Treatment Pain:  1/10  Subjective: Pt states pain level 1/10 upon arrival. No new issues/concerns reported at this time. Exercises:  Exercise 2: scifit 10 min L2.5  Exercise 4: 6 inch FSU/LSU x15  Exercise 5: Gastroc stretch off step 3x20\" ; standing soleus stretch 3x20\"  Exercise 6: Seated soleus raises x15 -- foot on 3in step    Manual:  PROM: L ankle PROM  Soft Tissue Mobalization: MFD cupping static placement to plantarflexors to decrease muscular restrictions -- followed by heated thermaprobe x 8 min    Modalities:  Cryotherapy (Minutes\Location): x15min  E-stim (parameters): IFC+CP x15min L foot/ankle for pain/edema       Assessment  Assessment: Gradual progressions to ther ex program, which pt tolerates well. Cont with manual PROM to L foot/ankle and STM with warm thermoprobe to mid/distal achilles region. Initiated IFC with CP for pain/edema. Will progress as tolerated. Activity Tolerance  Activity Tolerance: Patient Tolerated treatment well    Patient Education  Patient Education: New ex  Pt verbalized/demonstrated good understanding:     [x] Yes         [] No, pt required further clarification.        Post Treatment Pain: 1/10      Plan  Times per week: 2x/wk  Plan weeks: 4-6 weeks      Goals  (Total # of Visits to Date: 3)      Short term goals  Time Frame for Short term goals: 2 weeks  Short term goal 1: Pt will be educated on her POC and HEP-met  Short term goal 2: Pt will increase L ankle dorsiflexion to 10 degrees in order to normalize heel to toe pattern    Long term goals  Time Frame for Long term goals : 4 weeks  Long term goal 1: Pt will be safe and independent with her HEP  Long term goal 2: Pt will increase L ankle strength to >/=5/5 in order to tolerate a full work day  Long term goal 3: Pt will ambulate community distances with proper heel to toe gait pattern  Long term goal 4: Pt will report 90% improvement in her symptoms    Minutes Tracking:  Time In: 0732  Time Out: [address]  Minutes: 67  Timed Code Treatment Minutes: [place], [place], DPT      Date: 11/17/2021

## 2021-11-23 ENCOUNTER — HOSPITAL ENCOUNTER (OUTPATIENT)
Dept: PHYSICAL THERAPY | Age: 26
Setting detail: THERAPIES SERIES
Discharge: HOME | End: 2021-11-23
Payer: COMMERCIAL

## 2021-11-23 PROCEDURE — 97110 THERAPEUTIC EXERCISES: CPT

## 2021-11-23 PROCEDURE — 97140 MANUAL THERAPY 1/> REGIONS: CPT

## 2021-11-23 PROCEDURE — G0283 ELEC STIM OTHER THAN WOUND: HCPCS

## 2021-11-23 NOTE — PROGRESS NOTES
Phone: [facility]           Fax: [phone]                           Outpatient Physical Therapy                                                                            Daily Note    Patient: Frederick Moss : [address]  [facility] #: [id number]   Referring Practitioner:  Jackeline Neely CNP    Referral Date : 21     Date: 2021    Diagnosis: Z47.89 encounter for other orthopedic aftercare, S92.322D displaced fracture of 2nd metatarsal bone L foot, S92.242D displaced fracture of medial cuneiform of L foot  Treatment Diagnosis: L foot pain    Onset Date: 21  PT Insurance Information: Medical [place]  Total # of Visits Approved: 12 Per Physician Order  Total # of Visits to Date: 4  No Show: 0  Canceled Appointment: 0      Pre-Treatment Pain:  0/10  Subjective: Pt denies pain, states foot is just sore    Exercises:  Exercise 2: scifit 10 min L2.5  Exercise 3: AIREX:  tandem stance 3x30\" ea ;  SLS LLE 3x30\" ;  eccentric heel raise x10  Exercise 4: 6 inch FSU/LSU x15  Exercise 5: Gastroc stretch off step 3x20\" ; standing soleus stretch 3x20\"  Exercise 6: Seated soleus raises x15 -- foot on 3in step  Exercise 7: rocker board  both ways 25x  Exercise 8: BAPS board  L3  Exercise 9: slant board straight knee and knee bent    Manual:  PROM: L ankle PROM    Modalities:  Cryotherapy (Minutes\Location): x15min  E-stim (parameters): IFC+CP x15min L foot/ankle for pain/edema       Assessment  Assessment: States ankle just feels sore. Ptogressed exercise to improve strength and ankle mobility. Manual mobs to forefoot tolerated well. Pt instructed in self ROM with good understanding. IFC/cp for post exercise soreness    Activity Tolerance  Activity Tolerance: Patient Tolerated treatment well    Patient Education  Patient Education: Progression of exercie and self ROM  Pt verbalized/demonstrated good understanding:     [x] Yes         [] No, pt required further clarification. Post Treatment Pain:  2/10      Plan  Times per week: 2x/wk  Plan weeks: 4-6 weeks      Goals  (Total # of Visits to Date: 4)      Short term goals  Time Frame for Short term goals: 2 weeks  Short term goal 1: Pt will be educated on her POC and HEP-met  Short term goal 2: Pt will increase L ankle dorsiflexion to 10 degrees in order to normalize heel to toe pattern    Long term goals  Time Frame for Long term goals : 4 weeks  Long term goal 1: Pt will be safe and independent with her HEP  Long term goal 2: Pt will increase L ankle strength to >/=5/5 in order to tolerate a full work day  Long term goal 3: Pt will ambulate community distances with proper heel to toe gait pattern  Long term goal 4: Pt will report 90% improvement in her symptoms    Minutes Tracking:  Time In: 1045  Time Out: 1147  Minutes: 62  Timed Code Treatment Minutes: 61 Minutes       Pernell Anderson     Date: 11/23/2021

## 2021-11-26 ENCOUNTER — APPOINTMENT (OUTPATIENT)
Dept: PHYSICAL THERAPY | Age: 26
End: 2021-11-26
Payer: COMMERCIAL

## 2021-11-30 ENCOUNTER — APPOINTMENT (OUTPATIENT)
Dept: PHYSICAL THERAPY | Age: 26
End: 2021-11-30
Payer: COMMERCIAL

## 2021-12-02 ENCOUNTER — HOSPITAL ENCOUNTER (OUTPATIENT)
Dept: PHYSICAL THERAPY | Age: 26
Setting detail: THERAPIES SERIES
Discharge: HOME | End: 2021-12-02
Payer: COMMERCIAL

## 2021-12-02 PROCEDURE — 97110 THERAPEUTIC EXERCISES: CPT

## 2021-12-02 PROCEDURE — 97140 MANUAL THERAPY 1/> REGIONS: CPT

## 2021-12-02 NOTE — PROGRESS NOTES
Phone: [facility]           Fax: [phone]                           Outpatient Physical Therapy                                                                            Daily Note    Patient: Jose Leone : [birth year]  [facility] #: [id number]   Referring Practitioner:  Jayshree Moser CNP    Referral Date : 21     Date: 2021    Diagnosis: Z47.89 encounter for other orthopedic aftercare, S92.322D displaced fracture of 2nd metatarsal bone L foot, S92.242D displaced fracture of medial cuneiform of L foot  Treatment Diagnosis: L foot pain    Onset Date: 21  PT Insurance Information: Medical [place]  Total # of Visits Approved: 12 Per Physician Order  Total # of Visits to Date: 5  No Show: 0  Canceled Appointment: 0    Pre-Treatment Pain:  0/10  Subjective: Pt reports her pain depends on how much she does. Pt states she still cant wear a shoe d/t pain. Exercises:  Exercise 1: HEP 4-way ankle Tband, ankle AROM, calf/soleus stretch  Exercise 2: scifit 10 min L2.5  Exercise 3: AIREX:  tandem stance 3x30\" ea ;  SLS LLE 3x30\" ;  eccentric heel raise x10  Exercise 4: 6 inch FSU/LSU x15  Exercise 5: Gastroc stretch off step 3x20\" ; standing soleus stretch 3x20\"  Exercise 7: rocker board  both ways 25x  Exercise 8: BAPS board  L3  Exercise 9: slant board straight knee and knee bent    Manual:  PROM: L ankle PROM    Assessment  Assessment: Added TM amb today to better gait cycle. Pt with no increased pain noted today with ther-ex session. Activity Tolerance  Activity Tolerance: Patient Tolerated treatment well    Patient Education  Patient Education: Progression of exercie and self ROM  Pt verbalized/demonstrated good understanding:     [x] Yes         [] No, pt required further clarification.      Post Treatment Pain:  0/10    Plan  Times per week: 2x/wk  Plan weeks: 4-6 weeks      Goals  (Total # of Visits to Date: 5)      Short term goals  Time Frame for Short term goals: 2 weeks  Short term goal 1: Pt will be educated on her POC and HEP-met  Short term goal 2: Pt will increase L ankle dorsiflexion to 10 degrees in order to normalize heel to toe pattern    Long term goals  Time Frame for Long term goals : 4 weeks  Long term goal 1: Pt will be safe and independent with her HEP  Long term goal 2: Pt will increase L ankle strength to >/=5/5 in order to tolerate a full work day  Long term goal 3: Pt will ambulate community distances with proper heel to toe gait pattern  Long term goal 4: Pt will report 90% improvement in her symptoms    Minutes Tracking:  Time In: 1146  Time Out: 1225  Minutes: 39  Timed Code Treatment Minutes: Giancarlo Shaffer [address] [place] [address]         Date: 12/2/2021

## 2021-12-09 ENCOUNTER — HOSPITAL ENCOUNTER (OUTPATIENT)
Dept: PHYSICAL THERAPY | Age: 26
Setting detail: THERAPIES SERIES
Discharge: HOME | End: 2021-12-09
Payer: COMMERCIAL

## 2021-12-09 PROCEDURE — 97110 THERAPEUTIC EXERCISES: CPT

## 2021-12-09 PROCEDURE — G0283 ELEC STIM OTHER THAN WOUND: HCPCS

## 2021-12-09 PROCEDURE — 97140 MANUAL THERAPY 1/> REGIONS: CPT

## 2021-12-09 NOTE — PROGRESS NOTES
Phone: [facility]           Fax: [phone]                           Outpatient Physical Therapy                                                                            Daily Note    Patient: Michoacano Vasquez :   CSN #: [id number]   Referring Practitioner:  Bridger Prater CNP    Referral Date : 21     Date: 2021    Diagnosis: Z47.89 encounter for other orthopedic aftercare, S92.322D displaced fracture of 2nd metatarsal bone L foot, S92.242D displaced fracture of medial cuneiform of L foot  Treatment Diagnosis: L foot pain    Onset Date: 21  PT Insurance Information: Medical [place]  Total # of Visits Approved: 12 Per Physician Order  Total # of Visits to Date: 6  No Show: 0  Canceled Appointment: 0    Pre-Treatment Pain:  0/10  Subjective: Pt reports the only place she has pain is near where her surgery was and its minimal.    Exercises:  Exercise 1: HEP 4-way ankle Tband, ankle AROM, calf/soleus stretch  Exercise 2: scifit 10 min L2.5 elliptical today 6 mins. Exercise 3: AIREX:  tandem stance 3x30\" ea ;  SLS LLE 3x30\" ;  eccentric heel raise x10  Exercise 4: 6 inch FSU/LSU x15  Exercise 5: Gastroc stretch off step 3x20\" ; standing soleus stretch 3x20\"  Exercise 7: rocker board  both ways 25x  Exercise 8: BAPS board  L3  Exercise 10: soleus raise 10# 15x 2  Exercise 11: TG toe raise 15x2  Exercise 12: tandem walk/ bee bop walk/ mini lunges 1 lap ea  Exercise 13: BTB 4 way ankle    Manual:  PROM: L ankle PROM    Modalities:  Cryotherapy (Minutes\Location): x15min  E-stim (parameters): IFC+CP x15min L foot/ankle for pain/edema     Assessment  Assessment: Ankle strategy remains limited with L single leg standing and tandem walking exercises. Focused on ankle strength today for greater ankle strategy with dynamic surfaces. Will continue.     Activity Tolerance  Activity Tolerance: Patient Tolerated treatment well    Patient Education  Patient Education: Continue stretching/ SLS exercises. Pt verbalized/demonstrated good understanding:     [x] Yes         [] No, pt required further clarification.      Post Treatment Pain:  0/10    Plan  Times per week: 2x/wk  Plan weeks: 4-6 weeks    Goals  (Total # of Visits to Date: 6)      Short term goals  Time Frame for Short term goals: 2 weeks  Short term goal 1: Pt will be educated on her POC and HEP-met  Short term goal 2: Pt will increase L ankle dorsiflexion to 10 degrees in order to normalize heel to toe pattern    Long term goals  Time Frame for Long term goals : 4 weeks  Long term goal 1: Pt will be safe and independent with her HEP  Long term goal 2: Pt will increase L ankle strength to >/=5/5 in order to tolerate a full work day  Long term goal 3: Pt will ambulate community distances with proper heel to toe gait pattern  Long term goal 4: Pt will report 90% improvement in her symptoms    Minutes Tracking:  Time In: 1532  Time Out: 1630  Minutes: 58  Timed Code Treatment Minutes: [address] [address]          Date: 12/9/2021

## 2022-04-27 ENCOUNTER — TELEPHONE (OUTPATIENT)
Dept: FAMILY MEDICINE CLINIC | Age: 27
End: 2022-04-27

## 2022-04-27 ASSESSMENT — PATIENT HEALTH QUESTIONNAIRE - PHQ9
SUM OF ALL RESPONSES TO PHQ QUESTIONS 1-9: 0
1. LITTLE INTEREST OR PLEASURE IN DOING THINGS: 0
SUM OF ALL RESPONSES TO PHQ QUESTIONS 1-9: 0
SUM OF ALL RESPONSES TO PHQ QUESTIONS 1-9: 0
2. FEELING DOWN, DEPRESSED OR HOPELESS: 0
SUM OF ALL RESPONSES TO PHQ9 QUESTIONS 1 & 2: 0
SUM OF ALL RESPONSES TO PHQ QUESTIONS 1-9: 0

## 2022-06-08 ENCOUNTER — OFFICE VISIT (OUTPATIENT)
Dept: OBGYN | Age: 27
End: 2022-06-08
Payer: COMMERCIAL

## 2022-06-08 VITALS
SYSTOLIC BLOOD PRESSURE: 110 MMHG | BODY MASS INDEX: 33.8 KG/M2 | DIASTOLIC BLOOD PRESSURE: 70 MMHG | HEART RATE: 78 BPM | WEIGHT: 223 LBS | HEIGHT: 68 IN

## 2022-06-08 DIAGNOSIS — N81.89 PELVIC FLOOR RELAXATION: ICD-10-CM

## 2022-06-08 DIAGNOSIS — N39.3 STRESS INCONTINENCE OF URINE: ICD-10-CM

## 2022-06-08 DIAGNOSIS — Z01.419 WELL WOMAN EXAM WITH ROUTINE GYNECOLOGICAL EXAM: Primary | ICD-10-CM

## 2022-06-08 PROBLEM — Z34.01 ENCOUNTER FOR SUPERVISION OF NORMAL FIRST PREGNANCY IN FIRST TRIMESTER: Status: RESOLVED | Noted: 2021-01-12 | Resolved: 2022-06-08

## 2022-06-08 PROCEDURE — 99395 PREV VISIT EST AGE 18-39: CPT | Performed by: ADVANCED PRACTICE MIDWIFE

## 2022-06-08 NOTE — PROGRESS NOTES
YEARLY PHYSICAL    Date of service: 2022    Jenna Pereira  Is a 23 y.o.   female    PT's PCP is: Min Maciel MD     : 1995                                             Subjective:       Patient's last menstrual period was 2022 (exact date). Are your menses regular: yes    OB History    Para Term  AB Living   1 1 1 0 0 1   SAB IAB Ectopic Molar Multiple Live Births   0 0 0   0 1      # Outcome Date GA Lbr Aidan/2nd Weight Sex Delivery Anes PTL Lv   1 Term 21 39w0d 11:52 / 00:24 8 lb 3.8 oz (3.737 kg) M Vag-Spont EPI N WALT        Social History     Tobacco Use   Smoking Status Never Smoker   Smokeless Tobacco Never Used        Social History     Substance and Sexual Activity   Alcohol Use Not Currently    Alcohol/week: 0.0 standard drinks    Comment: rarely       Family History   Problem Relation Age of Onset    Cancer Maternal Grandmother         intestinal    Cancer Mother         NH lymphoma    Asthma Mother     Diabetes Father         oral medication and diet control    High Cholesterol Father     Hypertension Father     Other Sister         Clotting disorder    Other Brother         Clotting disorder    Other Maternal Grandfather         COVID    COPD Maternal Grandfather     Heart Attack Maternal Grandfather     Breast Cancer Paternal Grandmother        Any family history of breast or ovarian cancer: Yes, breast cancer     Any family history of blood clots: Yes    Have you had a positive covid test: No    Have you had the covid immunization: No      Allergies: Patient has no known allergies.       Current Outpatient Medications:     cromolyn (OPTICROM) 4 % ophthalmic solution, PLACE 1 DROP INTO BOTH EYES 4 TIMES DAILY (Patient not taking: Reported on 5/10/2021), Disp: 10 mL, Rfl: 1    Social History     Substance and Sexual Activity   Sexual Activity Yes    Partners: Male Comment: condoms        Any bleeding or pain with intercourse: No    Last Yearly: 2021    Last pap: 02/09/21    Last HPV: never     Last Mammogram: n/a    Last Dexascan n/a    Last colorectal screen- type:n/a*  date      Do you do self breast exams: No    Past Medical History:   Diagnosis Date    Allergic rhinitis     Asthma     Concussion     Conjunctivitis     allergic       Past Surgical History:   Procedure Laterality Date    FOOT SURGERY  09/01/2021       Family History   Problem Relation Age of Onset    Cancer Maternal Grandmother         intestinal    Cancer Mother         NH lymphoma    Asthma Mother     Diabetes Father         oral medication and diet control    High Cholesterol Father     Hypertension Father     Other Sister         Clotting disorder    Other Brother         Clotting disorder    Other Maternal Grandfather         COVID    COPD Maternal Grandfather     Heart Attack Maternal Grandfather     Breast Cancer Paternal Grandmother        Chief Complaint   Patient presents with    Gynecologic Exam     Yearly, last pap 02/09/21. pt declines std testing     Other     pt states when she sneezes or coughs she dribbles urine, \" i do my kegals\"          PE:  Vital Signs  Blood pressure 110/70, pulse 78, height 5' 8\" (1.727 m), weight 223 lb (101.2 kg), last menstrual period 05/12/2022, not currently breastfeeding. Estimated body mass index is 33.91 kg/m² as calculated from the following:    Height as of this encounter: 5' 8\" (1.727 m). Weight as of this encounter: 223 lb (101.2 kg). Labs:    No results found for this visit on 06/08/22. No data recorded    NURSE: OLGA    HPI: Patient here today for routine well woman exam.  Patient states overall they are doing really well and she denies needs at this time. Review of Systems   Genitourinary: Positive for urgency. Urinary leaking   All other systems reviewed and are negative.         Objective  Lymphatic:   no lymphadenopathy  Heent:   negative   Cor: regular rate and rhythm, no murmurs              Pul:clear to auscultation bilaterally- no wheezes, rales or rhonchi, normal air movement, no respiratory distress      GI: Abdomen soft, non-tender. BS normal. No masses,  No organomegaly           Extremities: normal strength, tone, and muscle mass   Breasts: Breast:normal appearance, no masses or tenderness   Pelvic Exam: GENITAL/URINARY:  External Genitalia:  General appearance; normal, Hair distribution; normal, Lesions absent  Urethra: Fullness absent, Masses absent  Bladder:  Fullness absent, Masses absent, Tenderness absent, Cystocele absent  Vagina:  General appearance normal, Estrogen effect normal, Discharge absent, Lesions absent, Pelvic support normal  Cervix:  General appearance normal, Lesions absent, Discharge absent, Tenderness absent, Enlargement absent, Nodularity absent  Uterus:  Size normal, Contour normal, Position normal  Adenexa: Masses absent, Tenderness absent, Enlargement absent                                    Vaginal discharge: no vaginal discharge                               Assessment and Plan          Diagnosis Orders   1. Well woman exam with routine gynecological exam     2. Stress incontinence of urine  Ambulatory referral to Physical Therapy   3. Pelvic floor relaxation  Ambulatory referral to Physical Therapy             I am having Bteh Victor maintain her cromolyn. Return in about 1 year (around 6/8/2023) for yearly * PT referal.    She was also counseled on her preventative health maintenance recommendations and follow-up. Patient Instructions   SURVEY:    You may be receiving a survey regarding your visit today. Please complete the survey to enable us to provide the highest quality of care to you and your family.     We strive for all 5's - thank you    If you cannot score us a very good (5) on any question, please call the office to discuss this with Martinez Greenberg (our ). We would like to discuss how we could of made your experience a very good one. Yojana Fields: [phone]    Thank you.           KIMBERLY Waggoner CNM,6/8/2022 4:25 PM

## 2022-06-08 NOTE — PATIENT INSTRUCTIONS
SURVEY:    You may be receiving a survey regarding your visit today. Please complete the survey to enable us to provide the highest quality of care to you and your family. We strive for all 5's - thank you    If you cannot score us a very good (5) on any question, please call the office to discuss this with Aubrey Salmon (our ). We would like to discuss how we could of made your experience a very good one. Aubrey Salmon: [phone]    Thank you.

## 2022-11-22 ENCOUNTER — HOSPITAL ENCOUNTER (OUTPATIENT)
Age: 27
Setting detail: SPECIMEN
Discharge: HOME | End: 2022-11-22
Payer: COMMERCIAL

## 2022-11-22 ENCOUNTER — INITIAL PRENATAL (OUTPATIENT)
Dept: OBGYN | Age: 27
End: 2022-11-22

## 2022-11-22 VITALS — WEIGHT: 211.2 LBS | DIASTOLIC BLOOD PRESSURE: 64 MMHG | SYSTOLIC BLOOD PRESSURE: 112 MMHG | BODY MASS INDEX: 32.11 KG/M2

## 2022-11-22 DIAGNOSIS — O21.9 NAUSEA AND VOMITING IN PREGNANCY: ICD-10-CM

## 2022-11-22 DIAGNOSIS — N91.2 AMENORRHEA: Primary | ICD-10-CM

## 2022-11-22 DIAGNOSIS — Z34.90 ENCOUNTER FOR SUPERVISION OF NORMAL PREGNANCY, ANTEPARTUM, UNSPECIFIED GRAVIDITY: ICD-10-CM

## 2022-11-22 DIAGNOSIS — Z32.01 POSITIVE URINE PREGNANCY TEST: ICD-10-CM

## 2022-11-22 DIAGNOSIS — N91.2 AMENORRHEA: ICD-10-CM

## 2022-11-22 DIAGNOSIS — Z34.90 ENCOUNTER FOR SUPERVISION OF NORMAL PREGNANCY, ANTEPARTUM, UNSPECIFIED GRAVIDITY: Primary | ICD-10-CM

## 2022-11-22 LAB
ABO/RH: NORMAL
AMPHETAMINE SCREEN URINE: NEGATIVE
ANTIBODY SCREEN: NEGATIVE
BARBITURATE SCREEN URINE: NEGATIVE
BENZODIAZEPINE SCREEN, URINE: NEGATIVE
BUPRENORPHINE URINE: NEGATIVE
CANNABINOID SCREEN URINE: NEGATIVE
COCAINE METABOLITE, URINE: NEGATIVE
CONTROL: ABNORMAL
HEPATITIS C ANTIBODY: NONREACTIVE
HIV AG/AB: NONREACTIVE
METHADONE SCREEN, URINE: NEGATIVE
METHAMPHETAMINE, URINE: NEGATIVE
OPIATES, URINE: NEGATIVE
OXYCODONE SCREEN URINE: NEGATIVE
PHENCYCLIDINE, URINE: NEGATIVE
PREGNANCY TEST URINE, POC: POSITIVE
PROPOXYPHENE, URINE: NEGATIVE
TRICYCLIC ANTIDEPRESSANTS, UR: NEGATIVE

## 2022-11-22 PROCEDURE — 86850 RBC ANTIBODY SCREEN: CPT

## 2022-11-22 PROCEDURE — 86762 RUBELLA ANTIBODY: CPT

## 2022-11-22 PROCEDURE — 0500F INITIAL PRENATAL CARE VISIT: CPT | Performed by: ADVANCED PRACTICE MIDWIFE

## 2022-11-22 PROCEDURE — 87340 HEPATITIS B SURFACE AG IA: CPT

## 2022-11-22 PROCEDURE — 87591 N.GONORRHOEAE DNA AMP PROB: CPT

## 2022-11-22 PROCEDURE — 86780 TREPONEMA PALLIDUM: CPT

## 2022-11-22 PROCEDURE — 86901 BLOOD TYPING SEROLOGIC RH(D): CPT

## 2022-11-22 PROCEDURE — 85025 COMPLETE CBC W/AUTO DIFF WBC: CPT

## 2022-11-22 PROCEDURE — 87389 HIV-1 AG W/HIV-1&-2 AB AG IA: CPT

## 2022-11-22 PROCEDURE — 86900 BLOOD TYPING SEROLOGIC ABO: CPT

## 2022-11-22 PROCEDURE — 86803 HEPATITIS C AB TEST: CPT

## 2022-11-22 PROCEDURE — 80306 DRUG TEST PRSMV INSTRMNT: CPT

## 2022-11-22 PROCEDURE — 87491 CHLMYD TRACH DNA AMP PROBE: CPT

## 2022-11-22 PROCEDURE — 87086 URINE CULTURE/COLONY COUNT: CPT

## 2022-11-22 RX ORDER — ONDANSETRON 4 MG/1
4 TABLET, FILM COATED ORAL DAILY PRN
Qty: 30 TABLET | Refills: 0 | Status: SHIPPED | OUTPATIENT
Start: 2022-11-22

## 2022-11-22 RX ORDER — LANOLIN ALCOHOL/MO/W.PET/CERES
50 CREAM (GRAM) TOPICAL DAILY
COMMUNITY

## 2022-11-22 RX ORDER — PNV NO.95/FERROUS FUM/FOLIC AC 28MG-0.8MG
1 TABLET ORAL DAILY
Qty: 90 TABLET | Refills: 3 | Status: SHIPPED | OUTPATIENT
Start: 2022-11-22

## 2022-11-22 NOTE — PATIENT INSTRUCTIONS
Patient Education        Nutrition for Breastfeeding: Care Instructions  Overview     If you are breastfeeding, your doctor may suggest that you eat more calories each day than otherwise recommended for a person of your height and weight. Breastfeeding helps build the bond between you and your baby. It gives your baby excellent health benefits. A healthy diet includes eating a variety of foods from the basic food groups: grains, vegetables, fruits, milk and milk products (such as cheese and yogurt), and meat and dried beans. Eating well during breastfeeding will ensure that you stay healthy. Follow-up care is a key part of your treatment and safety. Be sure to make and go to all appointments, and call your doctor if you are having problems. It's also a good idea to know your test results and keep a list of the medicines you take. How can you care for yourself at home? Making good choices about what you eat and drink when you are breastfeeding can help you stay healthy. It can also help your baby stay healthy. Here are some things you can do. Eat a variety of healthy foods. This includes vegetables, fruits, milk products, whole grains, and protein. Drink plenty of fluids. Make water your first choice. If you have kidney, heart, or liver disease and have to limit fluids, talk with your doctor before you increase your fluids. Avoid fish with high mercury. This includes shark, swordfish, conor mackerel, and marlin. It also includes orange roughy, bigeye tuna, and tilefish from the [facility]. Instead, eat fish that is low in mercury. Choose canned light tuna, salmon, pollock, catfish, or shrimp. Limit caffeine. Things like coffee, tea, chocolate, and some sodas can contain caffeine. Caffeine can pass through breast milk to your baby. It may cause fussiness and sleep problems. Talk to your doctor about how much caffeine is safe for you. Limit alcohol. Alcohol can pass through breast milk to your baby. Talk to your doctor if you have questions about drinking alcohol while breastfeeding. Be safe with supplements. Talk with your doctor before taking any vitamins, minerals, and herbal or other dietary supplements. When should you call for help? Watch closely for changes in your health, and be sure to contact your doctor if you have any problems. Where can you learn more? Go to https://chpepicewtuan.Hi-Midia. org and sign in to your [employer] account. Enter P234 in the [employer] box to learn more about \"Nutrition for Breastfeeding: Care Instructions. \"     If you do not have an account, please click on the \"Sign Up Now\" link. Current as of: May 9, 2022               Content Version: 13.4  © 2006-2022 [employer]. Care instructions adapted under license by [facility] ([facility]). If you have questions about a medical condition or this instruction, always ask your healthcare professional. [address] any warranty or liability for your use of this information. Patient Education        Nutrition During Pregnancy: Care Instructions  Overview     Healthy eating when you are pregnant is important for you and your baby. It can help you feel well and have a successful pregnancy and delivery. During pregnancy your nutrition needs increase. Even if you have excellent eating habits, your doctor may recommend a multivitamin to make sure you get enough iron and folic acid. You may wonder how much weight you should gain. In general, if you were at a healthy weight before you became pregnant, then you should gain between 25 and 35 pounds. If you were overweight before pregnancy, then you'll likely be advised to gain 15 to 25 pounds. If you were underweight before pregnancy, then you'll probably be advised to gain 28 to 40 pounds. Your doctor will work with you to set a weight goal that is right for you.  Gaining a healthy amount of weight helps you have a healthy baby.  Follow-up care is a key part of your treatment and safety. Be sure to make and go to all appointments, and call your doctor if you are having problems. It's also a good idea to know your test results and keep a list of the medicines you take. How can you care for yourself at home? Eat plenty of fruits and vegetables. Include a variety of orange, yellow, and leafy dark-green vegetables every day. Choose whole-grain bread, cereal, and pasta. Good choices include whole wheat bread, whole wheat pasta, brown rice, and oatmeal.  Get 4 or more servings of milk and milk products each day. Good choices include nonfat or low-fat milk, yogurt, and cheese. If you cannot eat milk products, you can get calcium from calcium-fortified products such as orange juice, soy milk, and tofu. Other non-milk sources of calcium include leafy green vegetables, such as broccoli, kale, mustard greens, turnip greens, bok todd, and brussels sprouts. If you eat meat, pick lower-fat types. Good choices include lean cuts of meat and chicken or turkey without the skin. Do not eat shark, swordfish, conor mackerel, or tilefish. They have high levels of mercury, which is dangerous to your baby. You can eat up to 12 ounces a week of fish or shellfish that have low mercury levels. Good choices include shrimp, wild salmon, pollock, and catfish. Limit some other types of fish, such as white (albacore) tuna, to 4 oz (0.1 kg) a week. Heat lunch meats (such as turkey, ham, or bologna) to 165°F before you eat them. This reduces your risk of getting sick from a kind of bacteria that can be found in lunch meats. Do not eat unpasteurized soft cheeses, such as brie, feta, fresh mozzarella, and blue cheese. They have a bacteria that could harm your baby. Limit caffeine. If you drink coffee or tea, have no more than 1 cup a day. Caffeine is also found in giorgio. Do not drink any alcohol.  No amount of alcohol has been found to be safe during pregnancy. Do not diet or try to lose weight. For example, do not follow a low-carbohydrate diet. If you are overweight at the start of your pregnancy, your doctor will work with you to manage your weight gain. Tell your doctor about all vitamins and supplements you take. When should you call for help? Watch closely for changes in your health, and be sure to contact your doctor if you have any problems. Where can you learn more? Go to https://chpekaleighewtuan.MtoV. org and sign in to your [employer] account. Enter Y785 in the [employer] box to learn more about \"Nutrition During Pregnancy: Care Instructions. \"     If you do not have an account, please click on the \"Sign Up Now\" link. Current as of: May 9, 2022               Content Version: 13.4  © 9981-7185 Healthwise, [employer]. Care instructions adapted under license by [facility] ([facility]). If you have questions about a medical condition or this instruction, always ask your healthcare professional. [place] [address] any warranty or liability for your use of this information. Patient Education        Weeks 6 to 10 of Your Pregnancy: Care Instructions  Your baby is growing very quickly. You may start to feel different, both in your body and your emotions. Each pregnancy is different, so there's no \"right\" way to feel. These early weeks are a time to make healthy choices for you and your baby. Take a daily prenatal vitamin. Choose one with folic acid in it. Avoid alcohol, tobacco, and drugs (including marijuana). They can harm your baby. Drink plenty of liquids. Be sure to drink enough water. And limit sodas, other sweetened drinks, and caffeine. Choose foods that are good sources of calcium, iron, and folate. You can try dark leafy greens, fortified orange juice and cereals, almonds, broccoli, dried fruit, and beans. Avoid foods that may harm your baby.   Don't eat raw meat, deli meat, raw seafood, or raw eggs. Avoid soft cheese and unpasteurized dairy, like Brie and blue cheese. And don't eat fish that contains a lot of mercury, like shark and swordfish. Don't touch klaus litter or cat poop. They can cause an infection that could harm your baby. Avoid things that can make your body too hot. For example, avoid hot tubs and saunas. Soothe morning sickness. Try eating 5 or 6 small meals a day, getting some fresh air, or using lucila to control symptoms. Follow-up care is a key part of your treatment and safety. Be sure to make and go to all appointments, and call your doctor if you are having problems. It's also a good idea to know your test results and keep a list of the medicines you take. Where can you learn more? Go to https://chraphaeleb.healthPeer39. org and sign in to your [employer] account. Enter G112 in the [employer] box to learn more about \"Weeks 6 to 10 of Your Pregnancy: Care Instructions. \"     If you do not have an account, please click on the \"Sign Up Now\" link. Current as of: February 23, 2022               Content Version: 13.4  © 1109-2250 Healthwise, [facility]. Care instructions adapted under license by [facility] ([facility]). If you have questions about a medical condition or this instruction, always ask your healthcare professional. [address] any warranty or liability for your use of this information.

## 2022-11-22 NOTE — PROGRESS NOTES
New OB Visit    Date of service: 2022    Michael Roper  Is a 33 y.o.  female presenting for a New OB visit with Nurse. Name of Father of Kaia Vora is Giuliano and is involved. Pt does work at self employed. Pt is not Fertility pt. PT's PCP is: Alicia Rice MD     : 1995                                             Subjective:       Patient's last menstrual period was 10/08/2022. OB History    Para Term  AB Living   2 1 1 0 0 1   SAB IAB Ectopic Molar Multiple Live Births   0 0 0   0 1      # Outcome Date GA Lbr Aidan/2nd Weight Sex Delivery Anes PTL Lv   2 Current            1 Term 21 39w0d 11:52 / 00:24 8 lb 3.8 oz (3.737 kg) M Vag-Spont EPI N WALT             Social History     Tobacco Use   Smoking Status Never   Smokeless Tobacco Never        Social History     Substance and Sexual Activity   Alcohol Use Not Currently    Alcohol/week: 0.0 standard drinks    Comment: rarely        Allergies: Patient has no known allergies. Current Outpatient Medications:     cromolyn (OPTICROM) 4 % ophthalmic solution, PLACE 1 DROP INTO BOTH EYES 4 TIMES DAILY (Patient not taking: Reported on 5/10/2021), Disp: 10 mL, Rfl: 1      Vital Signs Last menstrual period 10/08/2022, not currently breastfeeding. No results found for this visit on 22. Pain: none                            Nausea: yes      Vomiting: yes        Breast enlargement or tenderness: yes    Frequency of urination:no      Fatigue: yes         Patient history reviewed. Educational materials given and genetic testing reviewed.       Breastfeeding handouts given and reviewed: Yes     If BMI over 35 was HgA1C drawn: N/A     Does pt have a history gestational diabetes :  NO   If yes did we draw HgA1C: N/A     If history of thyroid problem was TSH drawn: N/A       My chart set up and activated: Yes    If history of preeclampsia did we start baby ASA: N/A    If history of  delivery - did we set up progesterone injections:  N/A    Does pt have a history of DVT? NO  If yes start Lovenox 40 mg daily    Is pt allergic to PCN? No:   If yes order U/A to culture and sensitivity if positive. Education:  There are no Patient Instructions on file for this visit. Assessment:      Diagnosis Orders   1. Amenorrhea        2. Positive urine pregnancy test        3.  Encounter for supervision of normal pregnancy, antepartum, unspecified             Plan: Order Routine Prenatal Lab Yes     Order additional testing if requested         Order Prenatal Vitamins   Yes             Appointment with Tim Gage CNM in 2 weeks for Dating U/S and total body exam if indicated      Nurse:   Ravinder Darby

## 2022-11-22 NOTE — TELEPHONE ENCOUNTER
New OB today, 11/22/22. Patient is very nauseous and having a lot of vomiting with it. Unable to keep much down. Doing unisome and b6 without relief.

## 2022-11-23 LAB
ABSOLUTE EOS #: 0.08 K/UL (ref 0–0.44)
ABSOLUTE IMMATURE GRANULOCYTE: <0.03 K/UL (ref 0–0.3)
ABSOLUTE LYMPH #: 1.29 K/UL (ref 1.1–3.7)
ABSOLUTE MONO #: 0.48 K/UL (ref 0.1–1.2)
BASOPHILS # BLD: 0 % (ref 0–2)
BASOPHILS ABSOLUTE: <0.03 K/UL (ref 0–0.2)
C. TRACHOMATIS DNA ,URINE: NEGATIVE
CULTURE: NORMAL
EOSINOPHILS RELATIVE PERCENT: 1 % (ref 1–4)
HCT VFR BLD CALC: 42.7 % (ref 36.3–47.1)
HEMOGLOBIN: 14.2 G/DL (ref 11.9–15.1)
HEPATITIS B SURFACE ANTIGEN: NONREACTIVE
IMMATURE GRANULOCYTES: 0 %
LYMPHOCYTES # BLD: 17 % (ref 24–43)
MCH RBC QN AUTO: 29.3 PG (ref 25.2–33.5)
MCHC RBC AUTO-ENTMCNC: 33.3 G/DL (ref 28.4–34.8)
MCV RBC AUTO: 88.2 FL (ref 82.6–102.9)
MONOCYTES # BLD: 6 % (ref 3–12)
N. GONORRHOEAE DNA, URINE: NEGATIVE
NRBC AUTOMATED: 0 PER 100 WBC
PDW BLD-RTO: 12.7 % (ref 11.8–14.4)
PLATELET # BLD: 295 K/UL (ref 138–453)
PMV BLD AUTO: 10.7 FL (ref 8.1–13.5)
RBC # BLD: 4.84 M/UL (ref 3.95–5.11)
RUBV IGG SER QL: 126.2 IU/ML
SEG NEUTROPHILS: 76 % (ref 36–65)
SEGMENTED NEUTROPHILS ABSOLUTE COUNT: 5.86 K/UL (ref 1.5–8.1)
SPECIMEN DESCRIPTION: NORMAL
SPECIMEN DESCRIPTION: NORMAL
T. PALLIDUM, IGG: NONREACTIVE
WBC # BLD: 7.8 K/UL (ref 3.5–11.3)

## 2022-12-05 ENCOUNTER — ROUTINE PRENATAL (OUTPATIENT)
Dept: OBGYN | Age: 27
End: 2022-12-05

## 2022-12-05 VITALS — SYSTOLIC BLOOD PRESSURE: 112 MMHG | DIASTOLIC BLOOD PRESSURE: 72 MMHG | BODY MASS INDEX: 31.93 KG/M2 | WEIGHT: 210 LBS

## 2022-12-05 DIAGNOSIS — Z3A.30 30 WEEKS GESTATION OF PREGNANCY: ICD-10-CM

## 2022-12-05 DIAGNOSIS — Z36.89 ENCOUNTER FOR ULTRASOUND TO ASSESS FETAL GROWTH: ICD-10-CM

## 2022-12-05 DIAGNOSIS — Z82.79 FAMILY HISTORY OF MARFAN SYNDROME: ICD-10-CM

## 2022-12-05 DIAGNOSIS — O26.891 HEARTBURN DURING PREGNANCY IN FIRST TRIMESTER: ICD-10-CM

## 2022-12-05 DIAGNOSIS — Z36.89 SCREENING, ANTENATAL, FOR FETAL ANATOMIC SURVEY: ICD-10-CM

## 2022-12-05 DIAGNOSIS — Z3A.20 20 WEEKS GESTATION OF PREGNANCY: ICD-10-CM

## 2022-12-05 DIAGNOSIS — Z3A.08 8 WEEKS GESTATION OF PREGNANCY: Primary | ICD-10-CM

## 2022-12-05 DIAGNOSIS — R12 HEARTBURN DURING PREGNANCY IN FIRST TRIMESTER: ICD-10-CM

## 2022-12-05 DIAGNOSIS — Z34.81 ENCOUNTER FOR SUPERVISION OF OTHER NORMAL PREGNANCY, FIRST TRIMESTER: ICD-10-CM

## 2022-12-05 PROCEDURE — 0502F SUBSEQUENT PRENATAL CARE: CPT | Performed by: ADVANCED PRACTICE MIDWIFE

## 2022-12-05 NOTE — PROGRESS NOTES
Ej Ivelisse is here at [address] [place] [address] for:    Chief Complaint   Patient presents with    Routine Prenatal Visit     Patient here for routine prenatal appointment with dating ultrasound. Wants to talk to you about if she needs to see MFM. Patient states she has a better understanding of  genetic condition. Would like to do a 26 week fetal echo done if possible. Also wondering if she can start the pepcid again. No other questions or concerns. Estimated Due Date: Estimated Date of Delivery: 7/15/23    OB History    Para Term  AB Living   2 1 1 0 0 1   SAB IAB Ectopic Molar Multiple Live Births   0 0 0   0 1      # Outcome Date GA Lbr Aidan/2nd Weight Sex Delivery Anes PTL Lv   2 Current            1 Term 21 39w0d 11:52 / 00:24 8 lb 3.8 oz (3.737 kg) M Vag-Spont EPI N WALT        Past Medical History:   Diagnosis Date    Allergic rhinitis     Asthma     Concussion     Conjunctivitis     allergic       Past Surgical History:   Procedure Laterality Date    FOOT SURGERY  2021       Social History     Tobacco Use   Smoking Status Never   Smokeless Tobacco Never        Social History     Substance and Sexual Activity   Alcohol Use Not Currently    Alcohol/week: 0.0 standard drinks    Comment: rarely       No results found for this visit on 22. HPI: Patient here today for OB visit. Patient states she does not really want to do MFM this time around and would just like a 26-week fetal echo. We did review fetal echo in [place] and she is fine with that as she states that is her pediatrician's office anyway. Yes PT denies fever, chills, nausea and vomiting       Vitals:  Estimated body mass index is 31.93 kg/m² as calculated from the following:    Height as of 22: 5' 8\" (1.727 m). Weight as of this encounter: 210 lb (95.3 kg).   BP: 112/72  Weight: 210 lb (95.3 kg)  Fetal HR: 177           Abdomen: soft    Results reviewed today:    2022  2:37 PM EST      8.2 WK IUP  CL: 3.7 cm  HR: 177 bpm  RT. OVARY: WNL  LT. OVARY: not visualized d/t over-lying bowel               ASSESSMENT & Plan    Diagnosis Orders   1. 8 weeks gestation of pregnancy  Amb External Referral To Cardiology      2. Encounter for supervision of other normal pregnancy, first trimester        3. 20 weeks gestation of pregnancy  US OB 14 PLUS WEEKS SINGLE OR FIRST GESTATION      4. Screening, , for fetal anatomic survey  US OB 14 PLUS WEEKS SINGLE OR FIRST GESTATION      5. 30 weeks gestation of pregnancy  US OB FOLLOW UP TRANSABDOMINAL APPROACH      6. Encounter for ultrasound to assess fetal growth  US OB FOLLOW UP TRANSABDOMINAL APPROACH      7. Heartburn during pregnancy in first trimester        8. Family history of Marfan syndrome  Amb External Referral To Cardiology            We talked about Prilosec after first trimester  Referral for fetal echo only      I am having Varun Ni maintain her cromolyn, Prenatal MV-Min-Fe Fum-FA-DHA (PRENATAL 1 PO), (Doxylamine Succinate, Sleep, (UNISOM PO)), vitamin B-6, Prenatal Vitamins, and ondansetron. Return for OB, OB 20 wk  & 30 wk with tdap . There are no Patient Instructions on file for this visit. Clinical References           Weeks 10 to 14 of Your Pregnancy: Care Instructions  It's now possible to hear your baby's heartbeat with a special ultrasound device. Your baby's organs are developing. And your baby's arms and legs can bend. Decide about tests to check for birth defects. Think about your age, your chance of passing on a family disease, your need to know about any problems, and what you might do after you have the test results. It's okay to exercise. Try walking or swimming. Check with your doctor before starting a new program.    Ardeth File may feel more tired than usual.  Taking naps during the day may help. You may feel emotional.  It might help to talk to someone. You may have headaches.   Try lying down and putting a cool cloth over your forehead. You can use acetaminophen (Tylenol) for pain relief. Don't take any anti-inflammatory medicines (such as Advil, Motrin, Aleve), unless your doctor says it's okay. You may feel a fullness or aching in your lower belly. This can feel like the kind of cramps you might get before a period. A back rub may help. You may need to urinate more. Your growing uterus and changing hormones can affect your bladder. You may feel sick to your stomach (morning sickness). Try avoiding food and smells that make you feel sick. Your breasts may feel different. They may feel tender or get bigger. Your nipples may get darker. Try a new bra that gives you good support. Avoid things that could harm your baby. This includes alcohol, tobacco, and drugs (including marijuana). Take a daily prenatal vitamin. Choose one with folic acid. Follow-up care is a key part of your treatment and safety. Be sure to make and go to all appointments, and call your doctor if you are having problems. It's also a good idea to know your test results and keep a list of the medicines you take. Where can you learn more? Go to https://YeelionpeSurIDx.Smartpay. org and sign in to your [employer] account. Enter L718 in the [facility] box to learn more about \"Weeks 10 to 14 of Your Pregnancy: Care Instructions. \"     If you do not have an account, please click on the \"Sign Up Now\" link. Current as of: February 23, 2022               Content Version: 13.4  © 2006-2022 Healthwise, Incorporated. Care instructions adapted under license by [facility] ([facility]). If you have questions about a medical condition or this instruction, always ask your healthcare professional. [place] [address] any warranty or liability for your use of this information.                       KIMBERLY Sotelo CNM,12/5/2022 6:30 PM

## 2022-12-19 DIAGNOSIS — O21.9 NAUSEA AND VOMITING IN PREGNANCY: ICD-10-CM

## 2022-12-19 RX ORDER — ONDANSETRON 4 MG/1
4 TABLET, FILM COATED ORAL DAILY PRN
Qty: 30 TABLET | Refills: 2 | Status: SHIPPED | OUTPATIENT
Start: 2022-12-19

## 2023-01-05 ENCOUNTER — ROUTINE PRENATAL (OUTPATIENT)
Dept: OBGYN | Age: 28
End: 2023-01-05

## 2023-01-05 VITALS
DIASTOLIC BLOOD PRESSURE: 78 MMHG | WEIGHT: 205 LBS | SYSTOLIC BLOOD PRESSURE: 120 MMHG | BODY MASS INDEX: 31.17 KG/M2 | HEART RATE: 98 BPM

## 2023-01-05 DIAGNOSIS — Z3A.12 12 WEEKS GESTATION OF PREGNANCY: Primary | ICD-10-CM

## 2023-01-05 DIAGNOSIS — Z34.81 ENCOUNTER FOR SUPERVISION OF OTHER NORMAL PREGNANCY, FIRST TRIMESTER: ICD-10-CM

## 2023-01-05 PROBLEM — S92.242D: Status: ACTIVE | Noted: 2023-01-05

## 2023-01-05 PROBLEM — S92.309A CLOSED FRACTURE OF METATARSAL BONE: Status: ACTIVE | Noted: 2023-01-05

## 2023-01-05 PROCEDURE — 0502F SUBSEQUENT PRENATAL CARE: CPT | Performed by: ADVANCED PRACTICE MIDWIFE

## 2023-01-05 ASSESSMENT — PATIENT HEALTH QUESTIONNAIRE - PHQ9
SUM OF ALL RESPONSES TO PHQ QUESTIONS 1-9: 0
1. LITTLE INTEREST OR PLEASURE IN DOING THINGS: 0
SUM OF ALL RESPONSES TO PHQ QUESTIONS 1-9: 0
SUM OF ALL RESPONSES TO PHQ QUESTIONS 1-9: 0
2. FEELING DOWN, DEPRESSED OR HOPELESS: 0
SUM OF ALL RESPONSES TO PHQ QUESTIONS 1-9: 0
SUM OF ALL RESPONSES TO PHQ9 QUESTIONS 1 & 2: 0

## 2023-01-05 NOTE — PROGRESS NOTES
Nava Tracey is here at 12w5d for:    Chief Complaint   Patient presents with    Routine Prenatal Visit     Pt states no issues or concerns     Nausea     Pt would like the zofran that dissolves        Estimated Due Date: Estimated Date of Delivery: 7/15/23    OB History    Para Term  AB Living   2 1 1 0 0 1   SAB IAB Ectopic Molar Multiple Live Births   0 0 0   0 1      # Outcome Date GA Lbr Aidan/2nd Weight Sex Delivery Anes PTL Lv   2 Current            1 Term 21 39w0d 11:52 / 00:24 8 lb 3.8 oz (3.737 kg) M Vag-Spont EPI N WALT        Past Medical History:   Diagnosis Date    Allergic rhinitis     Asthma     Concussion     Conjunctivitis     allergic       Past Surgical History:   Procedure Laterality Date    FOOT SURGERY  2021       Social History     Tobacco Use   Smoking Status Never   Smokeless Tobacco Never        Social History     Substance and Sexual Activity   Alcohol Use Not Currently    Alcohol/week: 0.0 standard drinks    Comment: rarely       No results found for this visit on 23. HPI: Pt doing well today pt denies needs at this time. Yes PT denies fever, chills, nausea and vomiting       Vitals:  Estimated body mass index is 31.17 kg/m² as calculated from the following:    Height as of 22: 5' 8\" (1.727 m). Weight as of this encounter: 205 lb (93 kg). BP: 120/78  Weight: 205 lb (93 kg)  Heart Rate: 98  Patient Position: Sitting  Fetal HR: 164  Movement: Absent           Abdomen: soft    Results reviewed today:    No results found for this visit on 23. ASSESSMENT & Plan    Diagnosis Orders   1. 12 weeks gestation of pregnancy        2. Encounter for supervision of other normal pregnancy, first trimester                  I am having Nava Tracey maintain her cromolyn, Prenatal MV-Min-Fe Fum-FA-DHA (PRENATAL 1 PO), (Doxylamine Succinate, Sleep, (UNISOM PO)), vitamin B-6, Prenatal Vitamins, and ondansetron.     Return for Keep next appt.    There are no Patient Instructions on file for this visit.              KIMBERLY White CNM,1/5/2023 5:59 PM

## 2023-01-30 RX ORDER — ONDANSETRON 4 MG/1
4 TABLET, ORALLY DISINTEGRATING ORAL 3 TIMES DAILY PRN
Qty: 30 TABLET | Refills: 2 | Status: SHIPPED | OUTPATIENT
Start: 2023-01-30 | End: 2023-01-31 | Stop reason: SDUPTHER

## 2023-01-31 ENCOUNTER — TELEPHONE (OUTPATIENT)
Dept: OBGYN | Age: 28
End: 2023-01-31

## 2023-01-31 DIAGNOSIS — O21.9 NAUSEA AND VOMITING IN PREGNANCY: Primary | ICD-10-CM

## 2023-01-31 RX ORDER — ONDANSETRON 8 MG/1
4 TABLET, ORALLY DISINTEGRATING ORAL 3 TIMES DAILY PRN
Qty: 45 TABLET | Refills: 1 | Status: SHIPPED | OUTPATIENT
Start: 2023-01-31

## 2023-02-02 ENCOUNTER — ROUTINE PRENATAL (OUTPATIENT)
Dept: OBGYN | Age: 28
End: 2023-02-02

## 2023-02-02 VITALS
BODY MASS INDEX: 31.93 KG/M2 | DIASTOLIC BLOOD PRESSURE: 80 MMHG | SYSTOLIC BLOOD PRESSURE: 120 MMHG | WEIGHT: 210 LBS | HEART RATE: 102 BPM

## 2023-02-02 DIAGNOSIS — Z3A.16 16 WEEKS GESTATION OF PREGNANCY: Primary | ICD-10-CM

## 2023-02-02 DIAGNOSIS — Z34.82 ENCOUNTER FOR SUPERVISION OF OTHER NORMAL PREGNANCY, SECOND TRIMESTER: ICD-10-CM

## 2023-02-02 DIAGNOSIS — Z82.79 FAMILY HISTORY OF MARFAN SYNDROME: ICD-10-CM

## 2023-02-02 PROCEDURE — 0502F SUBSEQUENT PRENATAL CARE: CPT | Performed by: ADVANCED PRACTICE MIDWIFE

## 2023-02-02 NOTE — PROGRESS NOTES
Hollis Canavan is here at 16w5d for:    Chief Complaint   Patient presents with    Routine Prenatal Visit     Pt states no issues or concerns        Estimated Due Date: Estimated Date of Delivery: 7/15/23    OB History    Para Term  AB Living   2 1 1 0 0 1   SAB IAB Ectopic Molar Multiple Live Births   0 0 0   0 1      # Outcome Date GA Lbr Aidan/2nd Weight Sex Delivery Anes PTL Lv   2 Current            1 Term 21 39w0d 11:52 / 00:24 8 lb 3.8 oz (3.737 kg) M Vag-Spont EPI N WALT        Past Medical History:   Diagnosis Date    Allergic rhinitis     Asthma     Concussion     Conjunctivitis     allergic       Past Surgical History:   Procedure Laterality Date    FOOT SURGERY  2021       Social History     Tobacco Use   Smoking Status Never   Smokeless Tobacco Never        Social History     Substance and Sexual Activity   Alcohol Use Not Currently    Alcohol/week: 0.0 standard drinks    Comment: rarely       No results found for this visit on 23. HPI: pt doing well today and denies needs at this time      Yes PT denies fever, chills, nausea and vomiting       Vitals:  Estimated body mass index is 31.93 kg/m² as calculated from the following:    Height as of 22: 5' 8\" (1.727 m). Weight as of this encounter: 210 lb (95.3 kg). BP: 120/80  Weight: 210 lb (95.3 kg)  Heart Rate: (!) 102  Patient Position: Sitting  Albumin: Negative  Glucose: Negative  Fetal HR: 148  Movement: Absent           Abdomen: soft    Results reviewed today:    No results found for this visit on 23. ASSESSMENT & Plan    Diagnosis Orders   1. 16 weeks gestation of pregnancy        2. Encounter for supervision of other normal pregnancy, second trimester        3.  Family history of Marfan syndrome                  I am having Hollis Canavan maintain her cromolyn, Prenatal MV-Min-Fe Fum-FA-DHA (PRENATAL 1 PO), (Doxylamine Succinate, Sleep, (UNISOM PO)), vitamin B-6, Prenatal Vitamins, and ondansetron. Return for Keep next appt. There are no Patient Instructions on file for this visit.              KIMBERLY Coyne CNM,2/2/2023 4:02 PM

## 2023-02-27 ENCOUNTER — ROUTINE PRENATAL (OUTPATIENT)
Dept: OBGYN | Age: 28
End: 2023-02-27

## 2023-02-27 VITALS — DIASTOLIC BLOOD PRESSURE: 72 MMHG | SYSTOLIC BLOOD PRESSURE: 104 MMHG | WEIGHT: 214 LBS | BODY MASS INDEX: 32.54 KG/M2

## 2023-02-27 DIAGNOSIS — Z3A.20 20 WEEKS GESTATION OF PREGNANCY: Primary | ICD-10-CM

## 2023-02-27 DIAGNOSIS — Z34.82 ENCOUNTER FOR SUPERVISION OF OTHER NORMAL PREGNANCY, SECOND TRIMESTER: ICD-10-CM

## 2023-02-27 PROBLEM — Z82.79 FAMILY HISTORY OF MARFAN SYNDROME: Status: RESOLVED | Noted: 2021-01-12 | Resolved: 2023-02-27

## 2023-02-27 PROCEDURE — 0502F SUBSEQUENT PRENATAL CARE: CPT | Performed by: ADVANCED PRACTICE MIDWIFE

## 2023-02-27 RX ORDER — ONDANSETRON 4 MG/1
TABLET, FILM COATED ORAL
COMMUNITY
Start: 2023-02-20

## 2023-02-27 NOTE — PROGRESS NOTES
Jessica Rock is here at 20w2d for:    Chief Complaint   Patient presents with    Routine Prenatal Visit     Patient here for routine prenatal appointment with 20 week ultrasound. Patient wondering if she will need weekly BPP and NSTs? Estimated Due Date: Estimated Date of Delivery: 7/15/23    OB History    Para Term  AB Living   2 1 1 0 0 1   SAB IAB Ectopic Molar Multiple Live Births   0 0 0   0 1      # Outcome Date GA Lbr Aidan/2nd Weight Sex Delivery Anes PTL Lv   2 Current            1 Term 21 39w0d 11:52 / 00:24 8 lb 3.8 oz (3.737 kg) M Vag-Spont EPI N WALT        Past Medical History:   Diagnosis Date    Allergic rhinitis     Asthma     Concussion     Conjunctivitis     allergic       Past Surgical History:   Procedure Laterality Date    FOOT SURGERY  2021       Social History     Tobacco Use   Smoking Status Never   Smokeless Tobacco Never        Social History     Substance and Sexual Activity   Alcohol Use Not Currently    Alcohol/week: 0.0 standard drinks    Comment: rarely       No results found for this visit on 23. HPI: Today for OB visit. Patient denies needs or concerns at this time states she is getting the fetal echo within the next 4 weeks    Yes PT denies fever, chills, nausea and vomiting       Vitals:  Estimated body mass index is 32.54 kg/m² as calculated from the following:    Height as of 22: 5' 8\" (1.727 m). Weight as of this encounter: 214 lb (97.1 kg).   BP: 104/72  Weight: 214 lb (97.1 kg)  Patient Position: Sitting  Albumin: Negative  Glucose: Negative  Fetal HR: 138  Movement: Present           Abdomen: soft    Results reviewed today:    2023 11:43 AM EST      20.2 WK IUP  CL:5.0cm  HR:138bpm  Anterior placenta, cephalic presentation  Ovaries: both seen, wnl  Gender: unknown   Active fetal movements  All visualized fetal anatomy WNL             ASSESSMENT & Plan    Diagnosis Orders   1. 20 weeks gestation of pregnancy 2. Encounter for supervision of other normal pregnancy, second trimester          Discussion with patient at this point in time due to no complications we will not do BPP's and NSTs however we will review evaluate after the next fetal echo        I am having Breanne Minaya maintain her cromolyn, Prenatal MV-Min-Fe Fum-FA-DHA (PRENATAL 1 PO), (Doxylamine Succinate, Sleep, (UNISOM PO)), vitamin B-6, Prenatal Vitamins, ondansetron, and ondansetron. Return for Keep next appt. There are no Patient Instructions on file for this visit. Clinical References           Learning About When to Call Your Doctor During Pregnancy (After 20 Weeks)  Overview  It's common to have concerns about what might be a problem when you're pregnant. Most pregnancies don't have any serious problems. But it's still important to know when to call your doctor if you have certain symptoms or signs of labor. These are general suggestions. Your doctor may give you some more information about when to call. When to call your doctor (after 20 weeks)  Call 911  anytime you think you may need emergency care. For example, call if:  You have severe vaginal bleeding. You have sudden, severe pain in your belly. You passed out (lost consciousness). You have a seizure. You see or feel the umbilical cord. You think you are about to deliver your baby and can't make it safely to the hospital.  Call your doctor now or seek immediate medical care if:  You have vaginal bleeding. You have belly pain. You have a fever. You have symptoms of preeclampsia, such as:  Sudden swelling of your face, hands, or feet. New vision problems (such as dimness, blurring, or seeing spots). A severe headache. You have a sudden release of fluid from your vagina. (You think your water broke.)  You think that you may be in labor. This means that you've had at least 6 contractions in an hour.   You notice that your baby has stopped moving or is moving much less than normal.  You have symptoms of a urinary tract infection. These may include:  Pain or burning when you urinate. A frequent need to urinate without being able to pass much urine. Pain in the flank, which is just below the rib cage and above the waist on either side of the back. Blood in your urine. Watch closely for changes in your health, and be sure to contact your doctor if:  You have vaginal discharge that smells bad. You have skin changes, such as:  A rash. Itching. Yellow color to your skin. You have other concerns about your pregnancy. If you have labor signs at 37 weeks or more  If you have signs of labor at 37 weeks or more, your doctor may tell you to call when your labor becomes more active. Symptoms of active labor include:  Contractions that are regular. Contractions that are less than 5 minutes apart. Contractions that are hard to talk through. Follow-up care is a key part of your treatment and safety. Be sure to make and go to all appointments, and call your doctor if you are having problems. It's also a good idea to know your test results and keep a list of the medicines you take. Where can you learn more? Go to http://www.woods.com/ and enter N531 to learn more about \"Learning About When to Call Your Doctor During Pregnancy (After 20 Weeks). \"  Current as of: February 23, 2022               Content Version: 13.5  © 3240-3681 Healthwise, Incorporated. Care instructions adapted under license by [facility] ([facility]). If you have questions about a medical condition or this instruction, always ask your healthcare professional. [place] [address] any warranty or liability for your use of this information.                       KIMBERLY Gilmore CNM,2/27/2023 11:58 AM

## 2023-03-27 ENCOUNTER — ROUTINE PRENATAL (OUTPATIENT)
Dept: OBGYN | Age: 28
End: 2023-03-27

## 2023-03-27 VITALS — SYSTOLIC BLOOD PRESSURE: 118 MMHG | WEIGHT: 223 LBS | DIASTOLIC BLOOD PRESSURE: 72 MMHG | BODY MASS INDEX: 33.91 KG/M2

## 2023-03-27 DIAGNOSIS — Z34.82 ENCOUNTER FOR SUPERVISION OF OTHER NORMAL PREGNANCY, SECOND TRIMESTER: ICD-10-CM

## 2023-03-27 DIAGNOSIS — Z3A.24 24 WEEKS GESTATION OF PREGNANCY: Primary | ICD-10-CM

## 2023-03-27 PROCEDURE — 0502F SUBSEQUENT PRENATAL CARE: CPT | Performed by: ADVANCED PRACTICE MIDWIFE

## 2023-03-27 RX ORDER — ONDANSETRON 4 MG/1
TABLET, ORALLY DISINTEGRATING ORAL
COMMUNITY
Start: 2023-02-02

## 2023-03-27 SDOH — ECONOMIC STABILITY: HOUSING INSECURITY
IN THE LAST 12 MONTHS, WAS THERE A TIME WHEN YOU DID NOT HAVE A STEADY PLACE TO SLEEP OR SLEPT IN A SHELTER (INCLUDING NOW)?: NO

## 2023-03-27 SDOH — ECONOMIC STABILITY: FOOD INSECURITY: WITHIN THE PAST 12 MONTHS, THE FOOD YOU BOUGHT JUST DIDN'T LAST AND YOU DIDN'T HAVE MONEY TO GET MORE.: NEVER TRUE

## 2023-03-27 SDOH — ECONOMIC STABILITY: FOOD INSECURITY: WITHIN THE PAST 12 MONTHS, YOU WORRIED THAT YOUR FOOD WOULD RUN OUT BEFORE YOU GOT MONEY TO BUY MORE.: NEVER TRUE

## 2023-03-27 SDOH — ECONOMIC STABILITY: INCOME INSECURITY: HOW HARD IS IT FOR YOU TO PAY FOR THE VERY BASICS LIKE FOOD, HOUSING, MEDICAL CARE, AND HEATING?: NOT HARD AT ALL

## 2023-03-27 NOTE — PROGRESS NOTES
shouldn't move. Hold the squeeze for 3 seconds, then relax for 5 to 10 seconds. Add 1 second each week until you can squeeze for 10 seconds. Repeat the exercise 10 times a session. Do 3 to 8 sessions a day. If these exercises cause you pain, stop doing them and talk with your doctor. Follow-up care is a key part of your treatment and safety. Be sure to make and go to all appointments, and call your doctor if you are having problems. It's also a good idea to know your test results and keep a list of the medicines you take. Where can you learn more? Go to http://www.woods.com/ and enter G264 to learn more about \"Weeks 22 to 26 of Your Pregnancy: Care Instructions. \"  Current as of: November 9, 2022               Content Version: 13.6  © 2148-4871 Healthwise, Incorporated. Care instructions adapted under license by [facility] ([facility]). If you have questions about a medical condition or this instruction, always ask your healthcare professional. [place] [address] any warranty or liability for your use of this information.                       KIMBERLY Varela CNM,3/27/2023 10:10 AM

## 2023-04-18 ENCOUNTER — ROUTINE PRENATAL (OUTPATIENT)
Dept: OBGYN | Age: 28
End: 2023-04-18
Payer: COMMERCIAL

## 2023-04-18 VITALS — BODY MASS INDEX: 34.67 KG/M2 | DIASTOLIC BLOOD PRESSURE: 76 MMHG | WEIGHT: 228 LBS | SYSTOLIC BLOOD PRESSURE: 120 MMHG

## 2023-04-18 DIAGNOSIS — Z34.82 ENCOUNTER FOR SUPERVISION OF OTHER NORMAL PREGNANCY, SECOND TRIMESTER: ICD-10-CM

## 2023-04-18 DIAGNOSIS — Z13.1 ENCOUNTER FOR SCREENING EXAMINATION FOR IMPAIRED GLUCOSE REGULATION AND DIABETES MELLITUS: ICD-10-CM

## 2023-04-18 DIAGNOSIS — Z3A.27 27 WEEKS GESTATION OF PREGNANCY: Primary | ICD-10-CM

## 2023-04-18 PROBLEM — Z3A.24 24 WEEKS GESTATION OF PREGNANCY: Status: RESOLVED | Noted: 2023-03-27 | Resolved: 2023-04-18

## 2023-04-18 PROCEDURE — 82962 GLUCOSE BLOOD TEST: CPT | Performed by: ADVANCED PRACTICE MIDWIFE

## 2023-04-18 PROCEDURE — 0502F SUBSEQUENT PRENATAL CARE: CPT | Performed by: ADVANCED PRACTICE MIDWIFE

## 2023-04-18 PROCEDURE — 85018 HEMOGLOBIN: CPT | Performed by: ADVANCED PRACTICE MIDWIFE

## 2023-04-18 NOTE — PROGRESS NOTES
Menchaca Rozel is here at 27w3d for:    Chief Complaint   Patient presents with    Routine Prenatal Visit     Pt presents today for routine ob care/ GTT. No questions or concerns at this time. Estimated Due Date: Estimated Date of Delivery: 7/15/23    OB History    Para Term  AB Living   2 1 1 0 0 1   SAB IAB Ectopic Molar Multiple Live Births   0 0 0   0 1      # Outcome Date GA Lbr Aidan/2nd Weight Sex Delivery Anes PTL Lv   2 Current            1 Term 21 39w0d 11:52 / 00:24 8 lb 3.8 oz (3.737 kg) M Vag-Spont EPI N WALT        Past Medical History:   Diagnosis Date    Allergic rhinitis     Asthma     Concussion     Conjunctivitis     allergic       Past Surgical History:   Procedure Laterality Date    FOOT SURGERY  2021       Social History     Tobacco Use   Smoking Status Never   Smokeless Tobacco Never        Social History     Substance and Sexual Activity   Alcohol Use Not Currently    Alcohol/week: 0.0 standard drinks    Comment: rarely       No results found for this visit on 23. HPI: Patient here today for OB visit. Patient denies needs or concerns at this time and states that the woman line is so far the best glucose she has had    Yes PT denies fever, chills, nausea and vomiting       Vitals:  Estimated body mass index is 34.67 kg/m² as calculated from the following:    Height as of 22: 5' 8\" (1.727 m). Weight as of this encounter: 228 lb (103.4 kg). BP: 120/76  Weight: 228 lb (103.4 kg)  Patient Position: Sitting  Albumin: Negative  Glucose: Negative  Fundal Height (cm): 28 cm  Fetal HR: 146  Movement: Present           Abdomen: soft    Results reviewed today:    No results found for this visit on 23. ASSESSMENT & Plan    Diagnosis Orders   1. 27 weeks gestation of pregnancy  POCT hemoglobin    POCT Glucose      2. Encounter for supervision of other normal pregnancy, second trimester  POCT hemoglobin    POCT Glucose      3.  Encounter

## 2023-05-08 ENCOUNTER — ROUTINE PRENATAL (OUTPATIENT)
Dept: OBGYN | Age: 28
End: 2023-05-08
Payer: COMMERCIAL

## 2023-05-08 VITALS — WEIGHT: 232.2 LBS | BODY MASS INDEX: 35.31 KG/M2 | SYSTOLIC BLOOD PRESSURE: 108 MMHG | DIASTOLIC BLOOD PRESSURE: 78 MMHG

## 2023-05-08 DIAGNOSIS — Z3A.30 30 WEEKS GESTATION OF PREGNANCY: Primary | ICD-10-CM

## 2023-05-08 DIAGNOSIS — Z23 NEED FOR TDAP VACCINATION: ICD-10-CM

## 2023-05-08 PROCEDURE — 90471 IMMUNIZATION ADMIN: CPT | Performed by: OBSTETRICS & GYNECOLOGY

## 2023-05-08 PROCEDURE — 90715 TDAP VACCINE 7 YRS/> IM: CPT | Performed by: OBSTETRICS & GYNECOLOGY

## 2023-05-08 PROCEDURE — 0502F SUBSEQUENT PRENATAL CARE: CPT | Performed by: OBSTETRICS & GYNECOLOGY

## 2023-05-08 NOTE — PROGRESS NOTES
The patient is here today for a routine OB visit and ultrasound review. She is having good fetal movement no problems or complaints.   Ultrasound is within the normal range for 30-week gestation

## 2023-05-22 ENCOUNTER — ROUTINE PRENATAL (OUTPATIENT)
Dept: OBGYN | Age: 28
End: 2023-05-22

## 2023-05-22 VITALS — WEIGHT: 234 LBS | BODY MASS INDEX: 35.58 KG/M2 | DIASTOLIC BLOOD PRESSURE: 70 MMHG | SYSTOLIC BLOOD PRESSURE: 116 MMHG

## 2023-05-22 DIAGNOSIS — Z34.83 ENCOUNTER FOR SUPERVISION OF OTHER NORMAL PREGNANCY, THIRD TRIMESTER: ICD-10-CM

## 2023-05-22 DIAGNOSIS — Z3A.32 32 WEEKS GESTATION OF PREGNANCY: Primary | ICD-10-CM

## 2023-05-22 PROCEDURE — 0502F SUBSEQUENT PRENATAL CARE: CPT | Performed by: ADVANCED PRACTICE MIDWIFE

## 2023-05-22 NOTE — PROGRESS NOTES
Angela Wall is here at [place] [address] for:    Chief Complaint   Patient presents with    Routine Prenatal Visit     Pt presents today for routine ob care. Pt has no questions or concerns at this time. Estimated Due Date: Estimated Date of Delivery: 7/15/23    OB History    Para Term  AB Living   2 1 1 0 0 1   SAB IAB Ectopic Molar Multiple Live Births   0 0 0   0 1      # Outcome Date GA Lbr Aidan/2nd Weight Sex Delivery Anes PTL Lv   2 Current            1 Term 21 39w0d 11:52 / 00:24 8 lb 3.8 oz (3.737 kg) M Vag-Spont EPI N WALT        Past Medical History:   Diagnosis Date    Allergic rhinitis     Asthma     Concussion     Conjunctivitis     allergic       Past Surgical History:   Procedure Laterality Date    FOOT SURGERY  2021       Social History     Tobacco Use   Smoking Status Never   Smokeless Tobacco Never        Social History     Substance and Sexual Activity   Alcohol Use Not Currently    Alcohol/week: 0.0 standard drinks    Comment: rarely       No results found for this visit on 23. HPI: Pt doing well today. Pt here for an OB visit. Yes PT denies fever, chills, nausea and vomiting       Vitals:  Estimated body mass index is 35.58 kg/m² as calculated from the following:    Height as of 22: 5' 8\" (1.727 m). Weight as of this encounter: 234 lb (106.1 kg). BP: 116/70  Weight - Scale: 234 lb (106.1 kg)  Patient Position: Sitting  Albumin: Trace  Glucose: Negative  Fundal Height (cm): 34 cm  Fetal HR: 136  Movement: Present           Abdomen: soft    Results reviewed today:    No results found for this visit on 23. ASSESSMENT & Plan    Diagnosis Orders   1. 32 weeks gestation of pregnancy        2.  Encounter for supervision of other normal pregnancy, third trimester                  I am having Angela Wall maintain her cromolyn, Prenatal MV-Min-Fe Fum-FA-DHA (PRENATAL 1 PO), (Doxylamine Succinate, Sleep, (UNISOM PO)), vitamin B-6,

## 2023-06-06 ENCOUNTER — ROUTINE PRENATAL (OUTPATIENT)
Dept: OBGYN | Age: 28
End: 2023-06-06

## 2023-06-06 VITALS — SYSTOLIC BLOOD PRESSURE: 120 MMHG | WEIGHT: 238 LBS | DIASTOLIC BLOOD PRESSURE: 64 MMHG | BODY MASS INDEX: 36.19 KG/M2

## 2023-06-06 DIAGNOSIS — Z3A.34 34 WEEKS GESTATION OF PREGNANCY: Primary | ICD-10-CM

## 2023-06-06 DIAGNOSIS — Z34.83 ENCOUNTER FOR SUPERVISION OF OTHER NORMAL PREGNANCY, THIRD TRIMESTER: ICD-10-CM

## 2023-06-06 PROBLEM — Z3A.32 32 WEEKS GESTATION OF PREGNANCY: Status: RESOLVED | Noted: 2023-05-22 | Resolved: 2023-06-06

## 2023-06-06 NOTE — PROGRESS NOTES
Pt is here today for routine ob care.
placenta. Follow-up care is a key part of your treatment and safety. Be sure to make and go to all appointments, and call your doctor if you are having problems. It's also a good idea to know your test results and keep a list of the medicines you take. Where can you learn more? Go to http://www.woods.com/ and enter B912 to learn more about \"Weeks 34 to 36 of Your Pregnancy: Care Instructions. \"  Current as of: November 9, 2022               Content Version: 13.6  © 7996-0004 Healthwise, Incorporated. Care instructions adapted under license by [facility] ([facility]). If you have questions about a medical condition or this instruction, always ask your healthcare professional. [address] any warranty or liability for your use of this information.                       KIMBERLY Appiah CNM,6/6/2023 10:20 AM

## 2023-06-21 ENCOUNTER — HOSPITAL ENCOUNTER (OUTPATIENT)
Age: 28
Setting detail: SPECIMEN
Discharge: HOME | End: 2023-06-21
Payer: COMMERCIAL

## 2023-06-21 ENCOUNTER — ROUTINE PRENATAL (OUTPATIENT)
Dept: OBGYN | Age: 28
End: 2023-06-21

## 2023-06-21 VITALS — WEIGHT: 241.8 LBS | SYSTOLIC BLOOD PRESSURE: 122 MMHG | BODY MASS INDEX: 36.77 KG/M2 | DIASTOLIC BLOOD PRESSURE: 74 MMHG

## 2023-06-21 DIAGNOSIS — Z3A.36 36 WEEKS GESTATION OF PREGNANCY: Primary | ICD-10-CM

## 2023-06-21 DIAGNOSIS — Z34.83 ENCOUNTER FOR SUPERVISION OF OTHER NORMAL PREGNANCY, THIRD TRIMESTER: ICD-10-CM

## 2023-06-21 DIAGNOSIS — Z3A.36 36 WEEKS GESTATION OF PREGNANCY: ICD-10-CM

## 2023-06-21 PROCEDURE — 87081 CULTURE SCREEN ONLY: CPT

## 2023-06-21 NOTE — PROGRESS NOTES
Barry Hartman is here at 36w4d for:    Chief Complaint   Patient presents with    Routine Prenatal Visit     GBS today. Estimated Due Date: Estimated Date of Delivery: 7/15/23    OB History    Para Term  AB Living   2 1 1 0 0 1   SAB IAB Ectopic Molar Multiple Live Births   0 0 0   0 1      # Outcome Date GA Lbr Aidan/2nd Weight Sex Delivery Anes PTL Lv   2 Current            1 Term 21 39w0d 11:52 / 00:24 8 lb 3.8 oz (3.737 kg) M Vag-Spont EPI N WALT        Past Medical History:   Diagnosis Date    Allergic rhinitis     Asthma     Concussion     Conjunctivitis     allergic       Past Surgical History:   Procedure Laterality Date    FOOT SURGERY  2021       Social History     Tobacco Use   Smoking Status Never   Smokeless Tobacco Never        Social History     Substance and Sexual Activity   Alcohol Use Not Currently    Alcohol/week: 0.0 standard drinks    Comment: rarely       No results found for this visit on 23. HPI: Here today for OB visit. Patient states she does not want a consult today and does not want check today. Patient Earnest Bumpers any other concerns at this time    Yes PT denies fever, chills, nausea and vomiting       Vitals:  Estimated body mass index is 36.77 kg/m² as calculated from the following:    Height as of 22: 5' 8\" (1.727 m). Weight as of this encounter: 241 lb 12.8 oz (109.7 kg). BP: 122/74  Weight - Scale: 241 lb 12.8 oz (109.7 kg)  Patient Position: Sitting  Albumin: Trace  Glucose: Negative  Fundal Height (cm): 36 cm  Fetal HR: 158  Movement: Present  Comments: no sve today           Abdomen: soft    Results reviewed today:    No results found for this visit on 23. ASSESSMENT & Plan    Diagnosis Orders   1. 36 weeks gestation of pregnancy  Culture, Strep B Screen, Vaginal/Rectal      2.  Encounter for supervision of other normal pregnancy, third trimester                  I am having Barry Hartman maintain her cromolyn,

## 2023-06-24 LAB
MICROORGANISM SPEC CULT: NORMAL
SPECIMEN DESCRIPTION: NORMAL

## 2023-06-28 ENCOUNTER — ROUTINE PRENATAL (OUTPATIENT)
Dept: OBGYN | Age: 28
End: 2023-06-28

## 2023-06-28 VITALS — DIASTOLIC BLOOD PRESSURE: 78 MMHG | BODY MASS INDEX: 36.95 KG/M2 | SYSTOLIC BLOOD PRESSURE: 122 MMHG | WEIGHT: 243 LBS

## 2023-06-28 DIAGNOSIS — Z3A.37 37 WEEKS GESTATION OF PREGNANCY: Primary | ICD-10-CM

## 2023-06-28 DIAGNOSIS — Z34.83 ENCOUNTER FOR SUPERVISION OF OTHER NORMAL PREGNANCY, THIRD TRIMESTER: ICD-10-CM

## 2023-07-06 ENCOUNTER — ROUTINE PRENATAL (OUTPATIENT)
Dept: OBGYN | Age: 28
End: 2023-07-06

## 2023-07-06 VITALS — DIASTOLIC BLOOD PRESSURE: 82 MMHG | SYSTOLIC BLOOD PRESSURE: 118 MMHG | WEIGHT: 245 LBS | BODY MASS INDEX: 37.25 KG/M2

## 2023-07-06 DIAGNOSIS — Z3A.38 38 WEEKS GESTATION OF PREGNANCY: Primary | ICD-10-CM

## 2023-07-06 DIAGNOSIS — Z34.83 ENCOUNTER FOR SUPERVISION OF OTHER NORMAL PREGNANCY, THIRD TRIMESTER: ICD-10-CM

## 2023-07-06 PROBLEM — Z3A.37 37 WEEKS GESTATION OF PREGNANCY: Status: RESOLVED | Noted: 2023-06-28 | Resolved: 2023-07-06

## 2023-07-09 DIAGNOSIS — O21.9 VOMITING OF PREGNANCY, UNSPECIFIED: ICD-10-CM

## 2023-07-10 DIAGNOSIS — O21.9 VOMITING OF PREGNANCY, UNSPECIFIED: ICD-10-CM

## 2023-07-10 RX ORDER — ONDANSETRON 4 MG/1
4 TABLET, FILM COATED ORAL DAILY PRN
Qty: 30 TABLET | Refills: 2 | Status: SHIPPED | OUTPATIENT
Start: 2023-07-10

## 2023-07-10 RX ORDER — ONDANSETRON 4 MG/1
TABLET, FILM COATED ORAL
Qty: 30 TABLET | Refills: 2 | OUTPATIENT
Start: 2023-07-10

## 2023-07-13 ENCOUNTER — ROUTINE PRENATAL (OUTPATIENT)
Dept: OBGYN | Age: 28
End: 2023-07-13

## 2023-07-13 VITALS
HEART RATE: 97 BPM | BODY MASS INDEX: 37.1 KG/M2 | WEIGHT: 244 LBS | SYSTOLIC BLOOD PRESSURE: 124 MMHG | DIASTOLIC BLOOD PRESSURE: 80 MMHG

## 2023-07-13 DIAGNOSIS — Z3A.39 39 WEEKS GESTATION OF PREGNANCY: Primary | ICD-10-CM

## 2023-07-13 DIAGNOSIS — Z34.83 ENCOUNTER FOR SUPERVISION OF OTHER NORMAL PREGNANCY, THIRD TRIMESTER: ICD-10-CM

## 2023-07-17 ENCOUNTER — ANESTHESIA EVENT (OUTPATIENT)
Dept: LABOR AND DELIVERY | Age: 28
End: 2023-07-17
Payer: COMMERCIAL

## 2023-07-17 ENCOUNTER — ANESTHESIA (OUTPATIENT)
Dept: LABOR AND DELIVERY | Age: 28
End: 2023-07-17
Payer: COMMERCIAL

## 2023-07-17 ENCOUNTER — HOSPITAL ENCOUNTER (INPATIENT)
Age: 28
LOS: 2 days | Discharge: HOME | End: 2023-07-19
Attending: ADVANCED PRACTICE MIDWIFE | Admitting: ADVANCED PRACTICE MIDWIFE
Payer: COMMERCIAL

## 2023-07-17 ENCOUNTER — APPOINTMENT (OUTPATIENT)
Dept: LABOR AND DELIVERY | Age: 28
End: 2023-07-17
Payer: COMMERCIAL

## 2023-07-17 PROBLEM — Z37.9 NORMAL LABOR: Status: ACTIVE | Noted: 2023-07-17

## 2023-07-17 PROBLEM — O48.0 POST-TERM PREGNANCY, 40-42 WEEKS OF GESTATION: Status: ACTIVE | Noted: 2023-07-17

## 2023-07-17 LAB
ABO + RH BLD: NORMAL
ARM BAND NUMBER: NORMAL
BASOPHILS # BLD: 0.04 K/UL (ref 0–0.2)
BASOPHILS NFR BLD: 0 % (ref 0–2)
BLOOD BANK SAMPLE EXPIRATION: NORMAL
BLOOD GROUP ANTIBODIES SERPL: NEGATIVE
EOSINOPHIL # BLD: 0.12 K/UL (ref 0–0.44)
EOSINOPHILS RELATIVE PERCENT: 1 % (ref 1–4)
ERYTHROCYTE [DISTWIDTH] IN BLOOD BY AUTOMATED COUNT: 13.7 % (ref 11.8–14.4)
HCT VFR BLD AUTO: 36.3 % (ref 36.3–47.1)
HGB BLD-MCNC: 11.8 G/DL (ref 11.9–15.1)
IMM GRANULOCYTES # BLD AUTO: 0.08 K/UL (ref 0–0.3)
IMM GRANULOCYTES NFR BLD: 1 %
LYMPHOCYTES # BLD: 20 % (ref 24–43)
LYMPHOCYTES NFR BLD: 2.06 K/UL (ref 1.1–3.7)
MCH RBC QN AUTO: 26.8 PG (ref 25.2–33.5)
MCHC RBC AUTO-ENTMCNC: 32.5 G/DL (ref 28.4–34.8)
MCV RBC AUTO: 82.3 FL (ref 82.6–102.9)
MONOCYTES NFR BLD: 0.71 K/UL (ref 0.1–1.2)
MONOCYTES NFR BLD: 7 % (ref 3–12)
NEUTROPHILS NFR BLD: 71 % (ref 36–65)
NEUTS SEG NFR BLD: 7.3 K/UL (ref 1.5–8.1)
NRBC BLD-RTO: 0 PER 100 WBC
PLATELET # BLD AUTO: 255 K/UL (ref 138–453)
PMV BLD AUTO: 11.5 FL (ref 8.1–13.5)
RBC # BLD AUTO: 4.41 M/UL (ref 3.95–5.11)
WBC OTHER # BLD: 10.3 K/UL (ref 3.5–11.3)

## 2023-07-17 PROCEDURE — 86901 BLOOD TYPING SEROLOGIC RH(D): CPT

## 2023-07-17 PROCEDURE — 3700000025 EPIDURAL BLOCK: Performed by: NURSE ANESTHETIST, CERTIFIED REGISTERED

## 2023-07-17 PROCEDURE — 6360000002 HC RX W HCPCS

## 2023-07-17 PROCEDURE — 2500000003 HC RX 250 WO HCPCS

## 2023-07-17 PROCEDURE — 6360000002 HC RX W HCPCS: Performed by: ADVANCED PRACTICE MIDWIFE

## 2023-07-17 PROCEDURE — 1220000000 HC SEMI PRIVATE OB R&B

## 2023-07-17 PROCEDURE — 7200000001 HC VAGINAL DELIVERY

## 2023-07-17 PROCEDURE — 6370000000 HC RX 637 (ALT 250 FOR IP): Performed by: ADVANCED PRACTICE MIDWIFE

## 2023-07-17 PROCEDURE — 10907ZC DRAINAGE OF AMNIOTIC FLUID, THERAPEUTIC FROM PRODUCTS OF CONCEPTION, VIA NATURAL OR ARTIFICIAL OPENING: ICD-10-PCS | Performed by: ADVANCED PRACTICE MIDWIFE

## 2023-07-17 PROCEDURE — 86850 RBC ANTIBODY SCREEN: CPT

## 2023-07-17 PROCEDURE — 59400 OBSTETRICAL CARE: CPT | Performed by: ADVANCED PRACTICE MIDWIFE

## 2023-07-17 PROCEDURE — 36415 COLL VENOUS BLD VENIPUNCTURE: CPT

## 2023-07-17 PROCEDURE — 86900 BLOOD TYPING SEROLOGIC ABO: CPT

## 2023-07-17 PROCEDURE — 85027 COMPLETE CBC AUTOMATED: CPT

## 2023-07-17 PROCEDURE — 3E033VJ INTRODUCTION OF OTHER HORMONE INTO PERIPHERAL VEIN, PERCUTANEOUS APPROACH: ICD-10-PCS | Performed by: ADVANCED PRACTICE MIDWIFE

## 2023-07-17 PROCEDURE — 2580000003 HC RX 258: Performed by: ADVANCED PRACTICE MIDWIFE

## 2023-07-17 RX ORDER — CARBOPROST TROMETHAMINE 250 UG/ML
250 INJECTION, SOLUTION INTRAMUSCULAR PRN
Status: DISCONTINUED | OUTPATIENT
Start: 2023-07-17 | End: 2023-07-17

## 2023-07-17 RX ORDER — SODIUM CHLORIDE 0.9 % (FLUSH) 0.9 %
5-40 SYRINGE (ML) INJECTION PRN
Status: DISCONTINUED | OUTPATIENT
Start: 2023-07-17 | End: 2023-07-19 | Stop reason: HOSPADM

## 2023-07-17 RX ORDER — SODIUM CHLORIDE 9 MG/ML
INJECTION, SOLUTION INTRAVENOUS PRN
Status: DISCONTINUED | OUTPATIENT
Start: 2023-07-17 | End: 2023-07-19 | Stop reason: HOSPADM

## 2023-07-17 RX ORDER — METHYLERGONOVINE MALEATE 0.2 MG/ML
200 INJECTION INTRAVENOUS PRN
Status: DISCONTINUED | OUTPATIENT
Start: 2023-07-17 | End: 2023-07-19 | Stop reason: HOSPADM

## 2023-07-17 RX ORDER — TRANEXAMIC ACID 10 MG/ML
1000 INJECTION, SOLUTION INTRAVENOUS
Status: ACTIVE | OUTPATIENT
Start: 2023-07-17 | End: 2023-07-18

## 2023-07-17 RX ORDER — SODIUM CHLORIDE 9 MG/ML
INJECTION, SOLUTION INTRAVENOUS PRN
Status: DISCONTINUED | OUTPATIENT
Start: 2023-07-17 | End: 2023-07-17

## 2023-07-17 RX ORDER — MODIFIED LANOLIN
OINTMENT (GRAM) TOPICAL PRN
Status: DISCONTINUED | OUTPATIENT
Start: 2023-07-17 | End: 2023-07-19 | Stop reason: HOSPADM

## 2023-07-17 RX ORDER — MISOPROSTOL 100 UG/1
200 TABLET ORAL PRN
Status: DISCONTINUED | OUTPATIENT
Start: 2023-07-17 | End: 2023-07-19 | Stop reason: HOSPADM

## 2023-07-17 RX ORDER — METHYLERGONOVINE MALEATE 0.2 MG/ML
200 INJECTION INTRAVENOUS PRN
Status: DISCONTINUED | OUTPATIENT
Start: 2023-07-17 | End: 2023-07-17

## 2023-07-17 RX ORDER — DOCUSATE SODIUM 100 MG/1
100 CAPSULE, LIQUID FILLED ORAL 2 TIMES DAILY PRN
Status: DISCONTINUED | OUTPATIENT
Start: 2023-07-17 | End: 2023-07-19 | Stop reason: HOSPADM

## 2023-07-17 RX ORDER — ONDANSETRON 2 MG/ML
4 INJECTION INTRAMUSCULAR; INTRAVENOUS EVERY 6 HOURS PRN
Status: DISCONTINUED | OUTPATIENT
Start: 2023-07-17 | End: 2023-07-17

## 2023-07-17 RX ORDER — SEVOFLURANE 250 ML/250ML
1 LIQUID RESPIRATORY (INHALATION) CONTINUOUS PRN
Status: DISCONTINUED | OUTPATIENT
Start: 2023-07-17 | End: 2023-07-17

## 2023-07-17 RX ORDER — MISOPROSTOL 100 UG/1
800 TABLET ORAL PRN
Status: DISCONTINUED | OUTPATIENT
Start: 2023-07-17 | End: 2023-07-19 | Stop reason: HOSPADM

## 2023-07-17 RX ORDER — LIDOCAINE HYDROCHLORIDE 20 MG/ML
INJECTION, SOLUTION EPIDURAL; INFILTRATION; INTRACAUDAL; PERINEURAL PRN
Status: DISCONTINUED | OUTPATIENT
Start: 2023-07-17 | End: 2023-07-17 | Stop reason: SDUPTHER

## 2023-07-17 RX ORDER — ROPIVACAINE HYDROCHLORIDE 2 MG/ML
12 INJECTION, SOLUTION EPIDURAL; INFILTRATION; PERINEURAL CONTINUOUS
Status: DISCONTINUED | OUTPATIENT
Start: 2023-07-17 | End: 2023-07-17

## 2023-07-17 RX ORDER — NALBUPHINE HYDROCHLORIDE 10 MG/ML
10 INJECTION, SOLUTION INTRAMUSCULAR; INTRAVENOUS; SUBCUTANEOUS
Status: DISCONTINUED | OUTPATIENT
Start: 2023-07-17 | End: 2023-07-17

## 2023-07-17 RX ORDER — SODIUM CHLORIDE 0.9 % (FLUSH) 0.9 %
5-40 SYRINGE (ML) INJECTION EVERY 12 HOURS SCHEDULED
Status: DISCONTINUED | OUTPATIENT
Start: 2023-07-17 | End: 2023-07-17

## 2023-07-17 RX ORDER — SODIUM CHLORIDE, SODIUM LACTATE, POTASSIUM CHLORIDE, CALCIUM CHLORIDE 600; 310; 30; 20 MG/100ML; MG/100ML; MG/100ML; MG/100ML
INJECTION, SOLUTION INTRAVENOUS CONTINUOUS
Status: DISCONTINUED | OUTPATIENT
Start: 2023-07-17 | End: 2023-07-17

## 2023-07-17 RX ORDER — MISOPROSTOL 100 UG/1
900 TABLET ORAL PRN
Status: DISCONTINUED | OUTPATIENT
Start: 2023-07-17 | End: 2023-07-17

## 2023-07-17 RX ORDER — SODIUM CHLORIDE 0.9 % (FLUSH) 0.9 %
5-40 SYRINGE (ML) INJECTION EVERY 12 HOURS SCHEDULED
Status: DISCONTINUED | OUTPATIENT
Start: 2023-07-17 | End: 2023-07-19 | Stop reason: HOSPADM

## 2023-07-17 RX ORDER — ACETAMINOPHEN 325 MG/1
650 TABLET ORAL EVERY 4 HOURS PRN
Status: DISCONTINUED | OUTPATIENT
Start: 2023-07-17 | End: 2023-07-17

## 2023-07-17 RX ORDER — SODIUM CHLORIDE, SODIUM LACTATE, POTASSIUM CHLORIDE, AND CALCIUM CHLORIDE .6; .31; .03; .02 G/100ML; G/100ML; G/100ML; G/100ML
1000 INJECTION, SOLUTION INTRAVENOUS PRN
Status: DISCONTINUED | OUTPATIENT
Start: 2023-07-17 | End: 2023-07-17

## 2023-07-17 RX ORDER — IBUPROFEN 800 MG/1
800 TABLET ORAL EVERY 8 HOURS
Status: DISCONTINUED | OUTPATIENT
Start: 2023-07-17 | End: 2023-07-19 | Stop reason: HOSPADM

## 2023-07-17 RX ORDER — FENTANYL CITRATE 50 UG/ML
INJECTION, SOLUTION INTRAMUSCULAR; INTRAVENOUS PRN
Status: DISCONTINUED | OUTPATIENT
Start: 2023-07-17 | End: 2023-07-17 | Stop reason: SDUPTHER

## 2023-07-17 RX ORDER — ACETAMINOPHEN 500 MG
1000 TABLET ORAL EVERY 8 HOURS PRN
Status: DISCONTINUED | OUTPATIENT
Start: 2023-07-17 | End: 2023-07-19 | Stop reason: HOSPADM

## 2023-07-17 RX ORDER — SODIUM CHLORIDE 0.9 % (FLUSH) 0.9 %
5-40 SYRINGE (ML) INJECTION PRN
Status: DISCONTINUED | OUTPATIENT
Start: 2023-07-17 | End: 2023-07-17

## 2023-07-17 RX ORDER — EPHEDRINE SULFATE 5 MG/ML
5 INJECTION INTRAVENOUS PRN
Status: DISCONTINUED | OUTPATIENT
Start: 2023-07-17 | End: 2023-07-17

## 2023-07-17 RX ORDER — TRANEXAMIC ACID 10 MG/ML
1000 INJECTION, SOLUTION INTRAVENOUS
Status: DISCONTINUED | OUTPATIENT
Start: 2023-07-17 | End: 2023-07-17

## 2023-07-17 RX ORDER — BUTORPHANOL TARTRATE 1 MG/ML
1 INJECTION, SOLUTION INTRAMUSCULAR; INTRAVENOUS
Status: DISCONTINUED | OUTPATIENT
Start: 2023-07-17 | End: 2023-07-17

## 2023-07-17 RX ORDER — ROPIVACAINE HYDROCHLORIDE 2 MG/ML
INJECTION, SOLUTION EPIDURAL; INFILTRATION; PERINEURAL CONTINUOUS PRN
Status: DISCONTINUED | OUTPATIENT
Start: 2023-07-17 | End: 2023-07-17 | Stop reason: SDUPTHER

## 2023-07-17 RX ORDER — SODIUM CHLORIDE, SODIUM LACTATE, POTASSIUM CHLORIDE, AND CALCIUM CHLORIDE .6; .31; .03; .02 G/100ML; G/100ML; G/100ML; G/100ML
500 INJECTION, SOLUTION INTRAVENOUS PRN
Status: DISCONTINUED | OUTPATIENT
Start: 2023-07-17 | End: 2023-07-17

## 2023-07-17 RX ORDER — CARBOPROST TROMETHAMINE 250 UG/ML
250 INJECTION, SOLUTION INTRAMUSCULAR PRN
Status: DISCONTINUED | OUTPATIENT
Start: 2023-07-17 | End: 2023-07-19 | Stop reason: HOSPADM

## 2023-07-17 RX ADMIN — IBUPROFEN 800 MG: 800 TABLET, FILM COATED ORAL at 16:07

## 2023-07-17 RX ADMIN — IBUPROFEN 800 MG: 800 TABLET, FILM COATED ORAL at 22:30

## 2023-07-17 RX ADMIN — ROPIVACAINE HYDROCHLORIDE 11 ML/HR: 2 INJECTION, SOLUTION EPIDURAL; INFILTRATION at 09:50

## 2023-07-17 RX ADMIN — ACETAMINOPHEN 1000 MG: 500 TABLET ORAL at 19:46

## 2023-07-17 RX ADMIN — Medication 1 MILLI-UNITS/MIN: at 06:30

## 2023-07-17 RX ADMIN — FENTANYL CITRATE 100 MCG: 50 INJECTION, SOLUTION INTRAMUSCULAR; INTRAVENOUS at 11:40

## 2023-07-17 RX ADMIN — ONDANSETRON 4 MG: 2 INJECTION INTRAMUSCULAR; INTRAVENOUS at 11:31

## 2023-07-17 RX ADMIN — Medication: at 16:07

## 2023-07-17 RX ADMIN — BENZOCAINE AND LEVOMENTHOL: 200; 5 SPRAY TOPICAL at 16:07

## 2023-07-17 RX ADMIN — DOCUSATE SODIUM 100 MG: 100 CAPSULE, LIQUID FILLED ORAL at 16:07

## 2023-07-17 RX ADMIN — LIDOCAINE HYDROCHLORIDE 2.5 ML: 20 INJECTION, SOLUTION EPIDURAL; INFILTRATION; INTRACAUDAL; PERINEURAL at 11:45

## 2023-07-17 RX ADMIN — SODIUM CHLORIDE, POTASSIUM CHLORIDE, SODIUM LACTATE AND CALCIUM CHLORIDE: 600; 310; 30; 20 INJECTION, SOLUTION INTRAVENOUS at 06:29

## 2023-07-17 RX ADMIN — LIDOCAINE HYDROCHLORIDE 2.5 ML: 20 INJECTION, SOLUTION EPIDURAL; INFILTRATION; INTRACAUDAL; PERINEURAL at 11:40

## 2023-07-17 ASSESSMENT — PAIN DESCRIPTION - LOCATION: LOCATION: HEAD

## 2023-07-17 ASSESSMENT — PAIN DESCRIPTION - DESCRIPTORS: DESCRIPTORS: ACHING

## 2023-07-17 ASSESSMENT — PAIN SCALES - GENERAL
PAINLEVEL_OUTOF10: 0
PAINLEVEL_OUTOF10: 1

## 2023-07-17 NOTE — LACTATION NOTE
Lactation education:    [x] Latch/ good latch vs shallow latch/ steps to obtaining deep latch    [x] How to know if infant is eating enough/ feedings per 24 hours, wet/dirty diapers    [x] Feeding/satiety cues      Lactation education resources given:     [x]  How to Breastfeed your baby - [address] [place] [address] publication      [x]  Follow up support information    [x]  Breast milk storage guidelines - [facility]    [x]  Breastpump cleaning guidelines - [facility]     [x]  Breastfeeding & Safe Sleep handout - [address] [place] [address] publication    [x]  Calling All Dads! Handout - [address] [place] [address] publication      []  Breast and Nipple Care - [facility]     []  [address]    []  [address]    []  Going Back to Work - [facility]    []  Preventing Engorgement - [facility]    Supplies given:    []  Brush, soap and basin for breastpump cleaning    []  Insurance pump provided     []  [facility] pump set up for patient to use    Explained to patient, patient verbalizes understanding.         Signed:  Antonella Morocho RN, BSN, IBCLC

## 2023-07-17 NOTE — FLOWSHEET NOTE
Dario Smoker and Cr DILLON at bedside for epidural placement at this time. Consents obtained. Pt placed in sitting position on side of bed. FHT audibly heard intermittently at this time d/t pt positioning. Test dose administered at 0945, maternal HR noted to be 80bpm at this time. Pt placed in a lying left sided tilt after epidural placement. US and toco adjusted at this time as well. FHTs noted in 130s at this time.

## 2023-07-17 NOTE — ANESTHESIA PROCEDURE NOTES
Epidural Block    Patient location during procedure: OB  Start time: 7/17/2023 9:40 AM  End time: 7/17/2023 9:55 AM  Reason for block: labor epidural  Staffing  Performed: other anesthesia staff   Resident/CRNA: KIMBERLY Carbone - SANTANA  Other anesthesia staff: Cheryl Collazo, RN  Epidural  Patient position: sitting  Prep: ChloraPrep  Patient monitoring: cardiac monitor, continuous pulse ox and frequent blood pressure checks  Approach: midline  Location: L2-3  Injection technique: LYNETTE saline  Provider prep: mask and sterile gloves  Needle  Needle type: Tuohy   Needle gauge: 17 G  Needle length: 3.5 in  Needle insertion depth: 7 cm  Catheter type: multi-orifice  Catheter size: 19 G  Catheter at skin depth: 13 cm  Test dose: negative  Kit: gutierrez  Lot number: [id number]  Expiration date: 11/30/2024Catheter Secured: tegaderm and tape  Assessment  Sensory level: T4  Hemodynamics: stable  Attempts: 1  Outcomes: uncomplicated and patient tolerated procedure well  Additional Notes  Negative heme, CSF, paresthesias.    Preanesthetic Checklist  Completed: patient identified, IV checked, site marked, risks and benefits discussed, surgical/procedural consents, equipment checked, pre-op evaluation, timeout performed, anesthesia consent given, oxygen available, monitors applied/VS acknowledged, fire risk safety assessment completed and verbalized and blood product R/B/A discussed and consented

## 2023-07-17 NOTE — L&D DELIVERY NOTE
Yovanny Mckeon [[id number]]      Labor Events     Labor: No   Steroids: None  Cervical Ripening Date/Time:      Rupture Identifier: Sac 1  Rupture Date/Time:  23 07:25:00   Rupture Type: AROM, Intact  Fluid Color: Clear  Fluid Odor: None  Fluid Volume:  Moderate  Induction: AROM, Oxytocin  Augmentation: None  Labor Complications: None   Additional Complications:  OB: DELIVERY - COMPLICATIONS   Nuchal cord, single gestation             Anesthesia    Method: Epidural       Labor Length    3rd stage: 0h 08m       Delivery Details      Delivery Date: 23 Delivery Time: 12:09:00   Delivery Type: Vaginal, Spontaneous              [place] Presentation    Presentation: Vertex  Position: Middle  _: Occiput  _: Anterior       Shoulder Dystocia    Shoulder Dystocia Present?: No       Assisted Delivery Details    Forceps Attempted?: No  Vacuum Extractor Attempted?: No                           Cord    Vessels: 3 Vessels  Complications: Nuchal Tight  Cord Around: Head  Delayed Cord Clamping?: Yes  Cord Clamped Date/Time: 2023 12:15:00  Cord Blood Disposition: Lab  Gases Sent?: No   Cord Comments:  PROCEDURAL - OBSTETRIC - CORD OBSERVATION   nuchal cord X 2   cord segment obtained and sent to lab             Placenta    Date/Time: 2023 12:17:44  Removal: Spontaneous  Appearance: Intact       Lacerations    Episiotomy: None  Perineal Lacerations: None  Other Lacerations: no non-perineal laceration       Vaginal Counts    Initial Count Personnel: Ileana Sommers  Initial Count Verified By: Ekta July  Intial Sponge Count: Correct  Intial Instruments Count: Correct   Final Sponges Count: Correct  Final Instruments Count: Correct   Final Count Personnel: Cornelio Dobbs  Final Count Verified By: Adriane Reese  Accurate Final Count?: Yes       Blood Loss  Mother: Donivan Sever #[id number]     Start of Mother's Information      Delivery Blood Loss  23 0009 - 23 1505      Quantitative Blood

## 2023-07-17 NOTE — H&P
Department of Obstetrics and Gynecology   Obstetrics History and Physical  H&P Admission Inpatient  Note        CHIEF COMPLAINT:    Chief Complaint   Patient presents with    Scheduled Induction        HISTORY OF PRESENT ILLNESS:      The patient is a 33 y.o. female at 45w3d. OB History          2    Para   1    Term   1       0    AB   0    Living   1         SAB   0    IAB   0    Ectopic   0    Molar        Multiple   0    Live Births   1            Patient presents with a chief complaint as above and is being admitted for induction and post dates    Estimated Due Date: Estimated Date of Delivery: 7/15/23    PRENATAL CARE:    Complicated by: none    PAST OB HISTORY:  OB History          2    Para   1    Term   1       0    AB   0    Living   1         SAB   0    IAB   0    Ectopic   0    Molar        Multiple   0    Live Births   1                Past Medical History:        Diagnosis Date    Allergic rhinitis     Asthma     Concussion     Conjunctivitis     allergic     Past Surgical History:        Procedure Laterality Date    FOOT SURGERY  2021     Allergies:  Patient has no known allergies.     Social History:    Social History     Socioeconomic History    Marital status:      Spouse name: Not on file    Number of children: Not on file    Years of education: Not on file    Highest education level: Not on file   Occupational History    Not on file   Tobacco Use    Smoking status: Never    Smokeless tobacco: Never   Vaping Use    Vaping Use: Never used   Substance and Sexual Activity    Alcohol use: Not Currently     Alcohol/week: 0.0 standard drinks     Comment: rarely    Drug use: No    Sexual activity: Yes     Partners: Male   Other Topics Concern    Not on file   Social History Narrative    Not on file     Social Determinants of Health     Financial Resource Strain: Low Risk     Difficulty of Paying Living Expenses: Not hard at all   Food Insecurity: No Food

## 2023-07-18 PROCEDURE — 6370000000 HC RX 637 (ALT 250 FOR IP): Performed by: ADVANCED PRACTICE MIDWIFE

## 2023-07-18 PROCEDURE — 1220000000 HC SEMI PRIVATE OB R&B

## 2023-07-18 RX ADMIN — IBUPROFEN 800 MG: 800 TABLET, FILM COATED ORAL at 21:59

## 2023-07-18 RX ADMIN — Medication: at 21:58

## 2023-07-18 RX ADMIN — IBUPROFEN 800 MG: 800 TABLET, FILM COATED ORAL at 06:11

## 2023-07-18 RX ADMIN — IBUPROFEN 800 MG: 800 TABLET, FILM COATED ORAL at 14:49

## 2023-07-18 RX ADMIN — DOCUSATE SODIUM 100 MG: 100 CAPSULE, LIQUID FILLED ORAL at 08:55

## 2023-07-18 ASSESSMENT — PAIN SCALES - GENERAL
PAINLEVEL_OUTOF10: 0
PAINLEVEL_OUTOF10: 0

## 2023-07-18 NOTE — PLAN OF CARE
Problem: Pain  Goal: Verbalizes/displays adequate comfort level or baseline comfort level  Outcome: Progressing  Flowsheets (Taken 2023 0900)  Verbalizes/displays adequate comfort level or baseline comfort level:   Encourage patient to monitor pain and request assistance   Assess pain using appropriate pain scale   Administer analgesics based on type and severity of pain and evaluate response     Problem: Vaginal Birth or  Section  Goal: Fetal and maternal status remain reassuring during the birth process  Description:  Birth OB-Pregnancy care plan goal which identifies if the fetal and maternal status remain reassuring during the birth process  Outcome: Progressing     Problem: Postpartum  Goal: Experiences normal postpartum course  Description:  Postpartum OB-Pregnancy care plan goal which identifies if the mother is experiencing a normal postpartum course  Outcome: Progressing  Goal: Appropriate maternal -  bonding  Description:  Postpartum OB-Pregnancy care plan goal which identifies if the mother and  are bonding appropriately  Outcome: Progressing  Goal: Establishment of infant feeding pattern  Description:  Postpartum OB-Pregnancy care plan goal which identifies if the mother is establishing a feeding pattern with their   Outcome: Progressing  Goal: Incisions, wounds, or drain sites healing without S/S of infection  Outcome: Progressing

## 2023-07-18 NOTE — ANESTHESIA POSTPROCEDURE EVALUATION
Department of Anesthesiology  Postprocedure Note    Patient: Madhu Elena  MRN: [id number]  YOB: 1995  Date of evaluation: 7/18/2023      Procedure Summary     Date: 07/17/23 Room / Location:     Anesthesia Start: 0935 Anesthesia Stop: 1209    Procedure: Labor Analgesia Diagnosis:     Scheduled Providers:  Responsible Provider: KIMBERLY Burgos CRNA    Anesthesia Type: epidural ASA Status: 2          Anesthesia Type: No value filed.     Britta Phase I:      Britta Phase II:        Anesthesia Post Evaluation    Patient location during evaluation: bedside  Patient participation: complete - patient participated  Level of consciousness: awake and alert  Pain score: 0  Airway patency: patent  Nausea & Vomiting: no nausea and no vomiting  Complications: no  Cardiovascular status: hemodynamically stable  Respiratory status: acceptable  Hydration status: stable

## 2023-07-18 NOTE — LACTATION NOTE
This note was copied from a baby's chart. RN stated that infant appears to have a lip tie:    Oral exam:  Upper lip: prominent labial frenum. Gums raghu when lip elevated   Tongue lateralization: [] Adequate [x] Limited  Tongue protrusion: [] Adequate [x] Limited  Tongue position in mouth: low  Lingual frenum: posterior frenum blanches and pops when tongue elevated. Noted eiffel tower appearance of floor of mouth. Suck assessment: disorganzed, munching at first. With encouragement, suck becomes more organized. Peristalsis is incomplete - no contact with palate, as palate is narrow and high.

## 2023-07-18 NOTE — LACTATION NOTE
Discussed pumping with pt. She has had success with a medela hand pump so far. Sized for correct flange size - pt has 17 mm inserts at home and the 17 mm flange works best for her. She has a [employer] z2 insurance pump at home. Explained to pt that she can contact [employer] customer support and obtain a free set of 17mm flanges. She verbalizes understanding.

## 2023-07-18 NOTE — DISCHARGE SUMMARY
Obstetrical Discharge Form        Gestational Age:40w2d    Antepartum complications: none    Date of Delivery: 23    Type of Delivery:        Delivered By:  Paige HARRIS CNM    Assisted By:N/A}      Baby: female    Anesthesia:  epidural      Intrapartum complications: None    Feeding method: breast    Blood type: A POSITIVE      Rubella:    Rubella Antibody, IgG   Date Value Ref Range Status   2022 126.2 IU/mL Final     Comment:                 REFERENCE RANGE:  <5.0       NON-REACTIVE (non-immune)  5.0 TO 9.9 EQUIVOCAL  >=10.0     REACTIVE     (immune)             T. Pallidium, IGG:    T. pallidum, IgG   Date Value Ref Range Status   2022 NONREACTIVE NONREACTIVE Final     Comment:           T. pallidum antibodies are not detected. There is no serological evidence of infection with T. pallidum (early primary syphilis   cannot be excluded). Retest in 2-4 weeks if syphilis is clinically suspect. Hepatitis B Surface Antigen:   Hepatitis B Surface Ag   Date Value Ref Range Status   2022 NONREACTIVE NONREACTIVE Final     HIV:    HIV Ag/Ab   Date Value Ref Range Status   2022 NONREACTIVE NONREACTIVE Final     Comment:     No laboratory evidence of HIV infection. If acute HIV infection is suspected, consider   testing for HIV-1 RNA.          Results for orders placed or performed during the hospital encounter of 23   CBC auto differential   Result Value Ref Range    WBC 10.3 3.5 - 11.3 k/uL    RBC 4.41 3.95 - 5.11 m/uL    Hemoglobin 11.8 (L) 11.9 - 15.1 g/dL    Hematocrit 36.3 36.3 - 47.1 %    MCV 82.3 (L) 82.6 - 102.9 fL    MCH 26.8 25.2 - 33.5 pg    MCHC 32.5 28.4 - 34.8 g/dL    RDW 13.7 11.8 - 14.4 %    Platelets 293 087 - 499 k/uL    MPV 11.5 8.1 - 13.5 fL    NRBC Automated 0.0 0.0 per 100 WBC    Seg Neutrophils 71 (H) 36 - 65 %    Lymphocytes 20 (L) 24 - 43 %    Monocytes 7 3 - 12 %    Eosinophils % 1 1 - 4 %    Basophils 0 0 - 2 %    Immature Granulocytes 1 (H) 0 %

## 2023-07-19 VITALS
HEART RATE: 94 BPM | RESPIRATION RATE: 18 BRPM | OXYGEN SATURATION: 98 % | DIASTOLIC BLOOD PRESSURE: 94 MMHG | SYSTOLIC BLOOD PRESSURE: 138 MMHG | TEMPERATURE: 98.4 F

## 2023-07-19 PROBLEM — O48.0 POST-TERM PREGNANCY, 40-42 WEEKS OF GESTATION: Status: RESOLVED | Noted: 2023-07-17 | Resolved: 2023-07-19

## 2023-07-19 PROBLEM — Z37.9 NORMAL LABOR: Status: RESOLVED | Noted: 2023-07-17 | Resolved: 2023-07-19

## 2023-07-19 NOTE — PLAN OF CARE
Problem: Pain  Goal: Verbalizes/displays adequate comfort level or baseline comfort level  2023 by Michelle Woodall RN  Outcome: Adequate for Discharge  2023 by Maira Wilde RN  Outcome: Progressing  Flowsheets (Taken 2023)  Verbalizes/displays adequate comfort level or baseline comfort level:   Encourage patient to monitor pain and request assistance   Assess pain using appropriate pain scale   Administer analgesics based on type and severity of pain and evaluate response   Implement non-pharmacological measures as appropriate and evaluate response   Consider cultural and social influences on pain and pain management   Notify Licensed Independent Practitioner if interventions unsuccessful or patient reports new pain     Problem: Vaginal Birth or  Section  Goal: Fetal and maternal status remain reassuring during the birth process  Description:  Birth OB-Pregnancy care plan goal which identifies if the fetal and maternal status remain reassuring during the birth process  2023 by Maira Wilde RN  Outcome: Completed     Problem: Postpartum  Goal: Experiences normal postpartum course  Description:  Postpartum OB-Pregnancy care plan goal which identifies if the mother is experiencing a normal postpartum course  2023 by Michelle Woodall RN  Outcome: Adequate for Discharge  2023 by Maira Wilde RN  Outcome: Progressing  Goal: Appropriate maternal -  bonding  Description:  Postpartum OB-Pregnancy care plan goal which identifies if the mother and  are bonding appropriately  2023 by Michelle Woodall RN  Outcome: Adequate for Discharge  2023 by Maira Wilde RN  Outcome: Progressing  Goal: Establishment of infant feeding pattern  Description:  Postpartum OB-Pregnancy care plan goal which identifies if the mother is establishing a feeding pattern with their   2023 by Michelle Woodall RN  Outcome: Adequate

## 2023-07-19 NOTE — PLAN OF CARE
Problem: Pain  Goal: Verbalizes/displays adequate comfort level or baseline comfort level  Outcome: Progressing  Flowsheets (Taken 2023)  Verbalizes/displays adequate comfort level or baseline comfort level:   Encourage patient to monitor pain and request assistance   Assess pain using appropriate pain scale   Administer analgesics based on type and severity of pain and evaluate response   Implement non-pharmacological measures as appropriate and evaluate response   Consider cultural and social influences on pain and pain management   Notify Licensed Independent Practitioner if interventions unsuccessful or patient reports new pain     Problem: Postpartum  Goal: Experiences normal postpartum course  Description:  Postpartum OB-Pregnancy care plan goal which identifies if the mother is experiencing a normal postpartum course  Outcome: Progressing  Goal: Appropriate maternal -  bonding  Description:  Postpartum OB-Pregnancy care plan goal which identifies if the mother and  are bonding appropriately  Outcome: Progressing  Goal: Establishment of infant feeding pattern  Description:  Postpartum OB-Pregnancy care plan goal which identifies if the mother is establishing a feeding pattern with their   Outcome: Progressing  Goal: Incisions, wounds, or drain sites healing without S/S of infection  Outcome: Progressing

## 2023-07-19 NOTE — DISCHARGE INSTRUCTIONS
Follow-up with your [facility] doctor as specified. [address] [facility] Department phone: [phone]    Dr. Valerie Escamilla [facility] [id number]   Anupam [phone]   or Nicholas Quinn [phone]     DIET  Eat a well balanced diet focusing on foods high in fiber and protein. Drink plenty of fluids especially water. To avoid constipation you may take a mild stool softener as recommended by your doctor or midwife. ACTIVITY  Gradually increase your activity. Resume exercise regimen only after advice by your doctor or midwife. Avoid lifting anything heavier than a gallon of milk for SIX weeks. Avoid driving until your doctor or midwife has given their approval.  [place] slowly from a lying to sitting and then a standing position. Climb stairs one at a time. Use caution when carrying your baby up and down the stairs. NO SEXUAL Activity for 4-6 weeks or until advised by your doctor; Nothing in vagina: intercourse, tampons, or douching. Be prepared to discuss family planning at your follow-up OB visit. You may feel tired or have a lack of energy. You may continue your prenatal vitamin to replenish nutrients post delivery. Nap when baby naps to catch up on sleep. EMOTIONS  You may feel fields, sad, teary, & overwhelmed. Contact your OB provider if you feel you may be showing signs of postpartum depression, or have thoughts of harming yourself or your infant. If infant will not stop crying, contact another adult for help or place infant in their crib on their back and take a break. NEVER shake your infant. BLEEDING  Vaginal bleeding will decrease in amount over the next few weeks. You will notice that as your activity increases, your flow may increase. This is your body's way of telling you, you need to take things easier and rest more often.   Call your care provider if you are saturating more than one maxi pad in an hour & resting

## 2023-07-19 NOTE — DISCHARGE SUMMARY
Obstetrical Discharge Form        Gestational Age:40w2d    Antepartum complications: none    Date of Delivery: 23    Type of Delivery:        Delivered By:  Sidney Fragoso. León HARRIS CNM    Assisted By:N/A}      Baby: female    Anesthesia:  epidural      Intrapartum complications: None    Feeding method:     Blood type: A POSITIVE      Rubella:    Rubella Antibody, IgG   Date Value Ref Range Status   2022 126.2 IU/mL Final     Comment:                 REFERENCE RANGE:  <5.0       NON-REACTIVE (non-immune)  5.0 TO 9.9 EQUIVOCAL  >=10.0     REACTIVE     (immune)             T. Pallidium, IGG:    T. pallidum, IgG   Date Value Ref Range Status   2022 NONREACTIVE NONREACTIVE Final     Comment:           T. pallidum antibodies are not detected. There is no serological evidence of infection with T. pallidum (early primary syphilis   cannot be excluded). Retest in 2-4 weeks if syphilis is clinically suspect. Hepatitis B Surface Antigen:   Hepatitis B Surface Ag   Date Value Ref Range Status   2022 NONREACTIVE NONREACTIVE Final     HIV:    HIV Ag/Ab   Date Value Ref Range Status   2022 NONREACTIVE NONREACTIVE Final     Comment:     No laboratory evidence of HIV infection. If acute HIV infection is suspected, consider   testing for HIV-1 RNA.          Results for orders placed or performed during the hospital encounter of 23   CBC auto differential   Result Value Ref Range    WBC 10.3 3.5 - 11.3 k/uL    RBC 4.41 3.95 - 5.11 m/uL    Hemoglobin 11.8 (L) 11.9 - 15.1 g/dL    Hematocrit 36.3 36.3 - 47.1 %    MCV 82.3 (L) 82.6 - 102.9 fL    MCH 26.8 25.2 - 33.5 pg    MCHC 32.5 28.4 - 34.8 g/dL    RDW 13.7 11.8 - 14.4 %    Platelets 588 548 - 232 k/uL    MPV 11.5 8.1 - 13.5 fL    NRBC Automated 0.0 0.0 per 100 WBC    Seg Neutrophils 71 (H) 36 - 65 %    Lymphocytes 20 (L) 24 - 43 %    Monocytes 7 3 - 12 %    Eosinophils % 1 1 - 4 %    Basophils 0 0 - 2 %    Immature Granulocytes 1 (H) 0 %

## 2023-07-21 NOTE — PROGRESS NOTES
Discharge instructions discussed with patient at this time, all issues and concerns addressed. Patient asked appropriate questions and answers provided. Patient denies any additional concerns at this time. Discharge paperwork signed. Patient given extra shavonne care items as requested. Informed to call when ready to leave department.

## 2023-07-21 NOTE — PROGRESS NOTES
placed in car seat, family called out to nurses station to leave. Mother and  out of OB department with OB staff, no distress noted, easy respirations noted. Infant being carried in car seat by family. No issues and concerns occurred. Patient off unit in stable condition. Departure Mode: with significant other.     Mobility at Departure: ambulatory    Discharged to: private residence    Time of Discharge: 1100

## 2023-08-02 ENCOUNTER — TELEPHONE (OUTPATIENT)
Dept: OBGYN | Age: 28
End: 2023-08-02

## 2023-08-23 ENCOUNTER — POSTPARTUM VISIT (OUTPATIENT)
Dept: OBGYN | Age: 28
End: 2023-08-23

## 2023-08-23 VITALS
SYSTOLIC BLOOD PRESSURE: 120 MMHG | HEIGHT: 68 IN | WEIGHT: 219 LBS | BODY MASS INDEX: 33.19 KG/M2 | DIASTOLIC BLOOD PRESSURE: 80 MMHG

## 2023-08-23 PROCEDURE — 0503F POSTPARTUM CARE VISIT: CPT | Performed by: ADVANCED PRACTICE MIDWIFE

## 2023-08-23 NOTE — PROGRESS NOTES
POSTPARTUM EXAM    Date of service: 2023    Sarah Araya  Is a 33 y.o.  female    PT's PCP is: Anastacio Li MD     : 1995     OB History    Para Term  AB Living   2 2 2 0 0 2   SAB IAB Ectopic Molar Multiple Live Births   0 0 0   0 2      # Outcome Date GA Lbr Aidan/2nd Weight Sex Delivery Anes PTL Lv   2 Term 23 40w2d  7 lb 9.6 oz (3.447 kg) F Vag-Spont EPI N WALT      Complications: Nuchal cord, single gestation   1 Term 21 39w0d 11:52 / 00:24 8 lb 3.8 oz (3.737 kg) M Vag-Spont EPI N WALT        Social History     Tobacco Use   Smoking Status Never   Smokeless Tobacco Never         Social History     Substance and Sexual Activity   Alcohol Use Not Currently    Alcohol/week: 0.0 standard drinks    Comment: rarely         Delivery date 23    Type of delivery:  Spontaneous vaginal delivery    Laceration:No,     Infant gender: female    Infant name:  Hemalatha Dangelo     Are you breast or bottle feeding? Breast    Have you been sexually active since delivery? No    Vital Signs: Blood pressure 120/80, height 5' 8\" (1.727 m), weight 219 lb (99.3 kg), last menstrual period 10/08/2022, currently breastfeeding. Labs:    Blood Type and Rh: A POSITIVE          Allergies: Patient has no known allergies. Current Outpatient Medications:     OMEPRAZOLE PO, Take by mouth (Patient not taking: Reported on 2023), Disp: , Rfl:     Prenatal MV-Min-Fe Fum-FA-DHA (PRENATAL 1 PO), Take by mouth (Patient not taking: Reported on 2023), Disp: , Rfl:     Last Yearly date:      Last pap date and results: 21    Last HPV date and results: never     Chief Complaint   Patient presents with    Postpartum Care     6 week pp care following vaginal delivery 23, last pap 21. Pt states no issues or concerns. Pt is currently breastfeeding, pt has not been sexually active since delivery, pt does not want to discuss cycle control options at this time.  Pt declines

## 2023-12-14 ENCOUNTER — OFFICE VISIT (OUTPATIENT)
Dept: PRIMARY CARE CLINIC | Age: 28
End: 2023-12-14
Payer: COMMERCIAL

## 2023-12-14 ENCOUNTER — HOSPITAL ENCOUNTER (OUTPATIENT)
Age: 28
Setting detail: SPECIMEN
Discharge: HOME | End: 2023-12-14

## 2023-12-14 VITALS
BODY MASS INDEX: 33.28 KG/M2 | WEIGHT: 219.6 LBS | OXYGEN SATURATION: 100 % | HEIGHT: 68 IN | DIASTOLIC BLOOD PRESSURE: 78 MMHG | HEART RATE: 83 BPM | SYSTOLIC BLOOD PRESSURE: 122 MMHG

## 2023-12-14 DIAGNOSIS — D49.2 SOFT TISSUE NEOPLASM: ICD-10-CM

## 2023-12-14 DIAGNOSIS — Z23 FLU VACCINE NEED: Primary | ICD-10-CM

## 2023-12-14 DIAGNOSIS — L98.9 SKIN LESION: ICD-10-CM

## 2023-12-14 PROCEDURE — 11102 TANGNTL BX SKIN SINGLE LES: CPT | Performed by: FAMILY MEDICINE

## 2023-12-14 PROCEDURE — 90674 CCIIV4 VAC NO PRSV 0.5 ML IM: CPT | Performed by: FAMILY MEDICINE

## 2023-12-14 PROCEDURE — 99204 OFFICE O/P NEW MOD 45 MIN: CPT | Performed by: FAMILY MEDICINE

## 2023-12-14 PROCEDURE — 90471 IMMUNIZATION ADMIN: CPT | Performed by: FAMILY MEDICINE

## 2023-12-14 RX ORDER — LIDOCAINE HYDROCHLORIDE AND EPINEPHRINE BITARTRATE 20; .01 MG/ML; MG/ML
0.5 INJECTION, SOLUTION SUBCUTANEOUS ONCE
Status: COMPLETED | OUTPATIENT
Start: 2023-12-14 | End: 2023-12-14

## 2023-12-14 RX ADMIN — LIDOCAINE HYDROCHLORIDE AND EPINEPHRINE BITARTRATE 0.5 ML: 20; .01 INJECTION, SOLUTION SUBCUTANEOUS at 16:30

## 2023-12-14 SDOH — HEALTH STABILITY: PHYSICAL HEALTH: ON AVERAGE, HOW MANY DAYS PER WEEK DO YOU ENGAGE IN MODERATE TO STRENUOUS EXERCISE (LIKE A BRISK WALK)?: 1 DAY

## 2023-12-14 SDOH — HEALTH STABILITY: PHYSICAL HEALTH: ON AVERAGE, HOW MANY MINUTES DO YOU ENGAGE IN EXERCISE AT THIS LEVEL?: 90 MIN

## 2023-12-14 NOTE — PROGRESS NOTES
[facility]      Patient:  Luis Armando Minor 33 y.o. female     Date of Service: 12/14/23      Chief Complaint:   Chief Complaint   Patient presents with    New Patient         History of Present Illness     Concern is of skin lesions x 4, also concerns of small possible soft tissue mass located left anterior thigh, right anterior thigh. Otherwise feels well with no changes in systemic symptoms. No fever, chills. No lymphadenopathy noted any region. No changes in bowel or bladder habits. Does have regular sun exposure and wears sunscreen and protective gear. Unknown if the skin lesions have changed size in the past few months or years. The soft tissue masses located on the thighs have been reported to have increased in size over the past 1 year. Allergies:    Patient has no known allergies. Medication List:    No current outpatient medications on file. Current Facility-Administered Medications   Medication Dose Route Frequency Provider Last Rate Last Admin    lidocaine-EPINEPHrine 2%-[id number] injection 0.5 mL  0.5 mL IntraDERmal Once Anabel Renner MD              Review of Systems   See hpi  Physical Exam   Physical Exam  Constitutional:       Appearance: Well-developed. HENT:      Head: Normocephalic. Right Ear: External ear normal.      Left Ear: External ear normal.   Cardiovascular:      Rate and Rhythm: Normal rate and regular rhythm. Heart sounds: Normal heart sounds. No murmur heard. Pulmonary:      Effort: Pulmonary effort is normal.      Breath sounds: Normal breath sounds. No wheezing. Abdominal:      Palpations: Abdomen is soft. Tenderness: There is no abdominal tenderness. Musculoskeletal:         General: Normal range of motion. Cervical back: Normal range of motion.  - There is a approximately 1 cm firm, not mobile lesion present at the left anterior thigh that is felt to be deep.   Approximately 1 cm firm lesion, not mobile

## 2024-06-24 ENCOUNTER — INITIAL PRENATAL (OUTPATIENT)
Dept: OBGYN | Age: 29
End: 2024-06-24

## 2024-06-24 ENCOUNTER — HOSPITAL ENCOUNTER (OUTPATIENT)
Age: 29
Setting detail: SPECIMEN
Discharge: HOME | End: 2024-06-24
Payer: COMMERCIAL

## 2024-06-24 VITALS — DIASTOLIC BLOOD PRESSURE: 68 MMHG | SYSTOLIC BLOOD PRESSURE: 112 MMHG | BODY MASS INDEX: 34.52 KG/M2 | WEIGHT: 227 LBS

## 2024-06-24 DIAGNOSIS — Z3A.01 6 WEEKS GESTATION OF PREGNANCY: ICD-10-CM

## 2024-06-24 DIAGNOSIS — N91.2 AMENORRHEA: ICD-10-CM

## 2024-06-24 DIAGNOSIS — Z34.81 ENCOUNTER FOR SUPERVISION OF OTHER NORMAL PREGNANCY IN FIRST TRIMESTER: ICD-10-CM

## 2024-06-24 DIAGNOSIS — Z34.81 ENCOUNTER FOR SUPERVISION OF OTHER NORMAL PREGNANCY IN FIRST TRIMESTER: Primary | ICD-10-CM

## 2024-06-24 LAB
ABO + RH BLD: NORMAL
BLOOD GROUP ANTIBODIES SERPL: NEGATIVE

## 2024-06-24 PROCEDURE — 86780 TREPONEMA PALLIDUM: CPT

## 2024-06-24 PROCEDURE — 86850 RBC ANTIBODY SCREEN: CPT

## 2024-06-24 PROCEDURE — 86762 RUBELLA ANTIBODY: CPT

## 2024-06-24 PROCEDURE — 87591 N.GONORRHOEAE DNA AMP PROB: CPT

## 2024-06-24 PROCEDURE — 87340 HEPATITIS B SURFACE AG IA: CPT

## 2024-06-24 PROCEDURE — 86901 BLOOD TYPING SEROLOGIC RH(D): CPT

## 2024-06-24 PROCEDURE — 87086 URINE CULTURE/COLONY COUNT: CPT

## 2024-06-24 PROCEDURE — 0500F INITIAL PRENATAL CARE VISIT: CPT | Performed by: ADVANCED PRACTICE MIDWIFE

## 2024-06-24 PROCEDURE — 86803 HEPATITIS C AB TEST: CPT

## 2024-06-24 PROCEDURE — 85025 COMPLETE CBC W/AUTO DIFF WBC: CPT

## 2024-06-24 PROCEDURE — 86900 BLOOD TYPING SEROLOGIC ABO: CPT

## 2024-06-24 PROCEDURE — 87491 CHLMYD TRACH DNA AMP PROBE: CPT

## 2024-06-24 PROCEDURE — 87389 HIV-1 AG W/HIV-1&-2 AB AG IA: CPT

## 2024-06-24 NOTE — PATIENT INSTRUCTIONS
ham, or bologna) to 165°F before you eat them. This reduces your risk of getting sick from a kind of bacteria that can be found in lunch meats.  Do not eat unpasteurized soft cheeses, such as brie, feta, fresh mozzarella, and blue cheese. They have a bacteria that could harm your baby.  Limit caffeine to about 200 to 300 mg per day. On average, a cup of brewed coffee has around 80 to 100 mg of caffeine.  Do not drink any alcohol. No amount of alcohol has been found to be safe during pregnancy.  Do not diet or try to lose weight. For example, do not follow a low-carbohydrate diet. If you are overweight at the start of your pregnancy, your doctor will work with you to manage your weight gain.  Tell your doctor about all vitamins and supplements you take.  When should you call for help?  Watch closely for changes in your health, and be sure to contact your doctor if you have any problems.  Where can you learn more?  Go to https://www.[employer].net/patientEd and enter Y785 to learn more about \"Nutrition During Pregnancy: Care Instructions.\"  Current as of: September 20, 2023  Content Version: 14.1  © 7987-4888 [employer].   Care instructions adapted under license by [employer]. If you have questions about a medical condition or this instruction, always ask your healthcare professional. [employer] disclaims any warranty or liability for your use of this information.       Patient Education        Weeks 6 to 10 of Your Pregnancy: Care Instructions  During these weeks of pregnancy, your body goes through many changes. You may start to feel different, both in your body and your emotions. Each pregnancy is different, so there's no \"right\" way to feel. These early weeks are a time to make healthy choices for you and your pregnancy.    Take a daily prenatal vitamin. Choose one with folic acid in it.   Avoid alcohol, tobacco, and drugs (including marijuana). If you need help quitting, talk to your

## 2024-06-24 NOTE — PROGRESS NOTES
New OB Visit    Date of service: 2024    Anay Mendez  Is a 28 y.o.  female presenting for a New OB visit with Nurse. Name of Father of Baby is Giuliano Mendez  and is involved. Pt does work at [employer].    Pt is not Fertility pt.    PT's PCP is: Meli Renner MD     : 1995                                             Subjective:        Patient's last menstrual period was 2024.   OB History    Para Term  AB Living   3 2 2 0 0 2   SAB IAB Ectopic Molar Multiple Live Births   0 0 0   0 2      # Outcome Date GA Lbr Aidan/2nd Weight Sex Delivery Anes PTL Lv   3 Current            2 Term 23 40w2d  3.447 kg (7 lb 9.6 oz) F Vag-Spont EPI N WALT      Complications: Nuchal cord, single gestation   1 Term 21 39w0d 11:52 / 00:24 3.737 kg (8 lb 3.8 oz) M Vag-Spont EPI N WALT           Social History     Tobacco Use   Smoking Status Never   Smokeless Tobacco Never        Social History     Substance and Sexual Activity   Alcohol Use Not Currently        Allergies: Patient has no known allergies.    Past Medical History:   Diagnosis Date    Allergic rhinitis     Asthma     Concussion     Conjunctivitis     allergic       Past Surgical History:   Procedure Laterality Date    FOOT SURGERY  2021    FRACTURE SURGERY  21    Fell down stairs and broke my foot         Current Outpatient Medications:     Prenatal Vit-Fe Fumarate-FA (PRENATAL 19 PO), Take by mouth, Disp: , Rfl:       Vital Signs Blood pressure 112/68, weight 103 kg (227 lb), last menstrual period 2024, currently breastfeeding.      No results found for this visit on 24.      Pain: none                            Nausea: yes      Vomiting: no        Breast enlargement or tenderness: yes    Frequency of urination:yes      Fatigue: yes         Patient history reviewed. Educational materials given and genetic testing reviewed.     OB Genetic Screening    Patient's Age 35+ at Date of Delivery No

## 2024-06-25 LAB
BASOPHILS # BLD: 0.04 K/UL (ref 0–0.2)
BASOPHILS NFR BLD: 0 % (ref 0–2)
CHLAMYDIA DNA UR QL NAA+PROBE: NEGATIVE
EOSINOPHIL # BLD: 0.18 K/UL (ref 0–0.44)
EOSINOPHILS RELATIVE PERCENT: 2 % (ref 1–4)
ERYTHROCYTE [DISTWIDTH] IN BLOOD BY AUTOMATED COUNT: 12.6 % (ref 11.8–14.4)
HBV SURFACE AG SERPL QL IA: NONREACTIVE
HCT VFR BLD AUTO: 41 % (ref 36.3–47.1)
HCV AB SERPL QL IA: NONREACTIVE
HGB BLD-MCNC: 13.6 G/DL (ref 11.9–15.1)
HIV 1+2 AB+HIV1 P24 AG SERPL QL IA: NONREACTIVE
IMM GRANULOCYTES # BLD AUTO: 0.05 K/UL (ref 0–0.3)
IMM GRANULOCYTES NFR BLD: 1 %
LYMPHOCYTES NFR BLD: 2.27 K/UL (ref 1.1–3.7)
LYMPHOCYTES RELATIVE PERCENT: 25 % (ref 24–43)
MCH RBC QN AUTO: 28.5 PG (ref 25.2–33.5)
MCHC RBC AUTO-ENTMCNC: 33.2 G/DL (ref 28.4–34.8)
MCV RBC AUTO: 86 FL (ref 82.6–102.9)
MONOCYTES NFR BLD: 0.62 K/UL (ref 0.1–1.2)
MONOCYTES NFR BLD: 7 % (ref 3–12)
N GONORRHOEA DNA UR QL NAA+PROBE: NEGATIVE
NEUTROPHILS NFR BLD: 65 % (ref 36–65)
NEUTS SEG NFR BLD: 5.97 K/UL (ref 1.5–8.1)
NRBC BLD-RTO: 0 PER 100 WBC
PLATELET # BLD AUTO: 283 K/UL (ref 138–453)
PMV BLD AUTO: 10.8 FL (ref 8.1–13.5)
RBC # BLD AUTO: 4.77 M/UL (ref 3.95–5.11)
RUBV IGG SERPL QL IA: 150 IU/ML
SPECIMEN DESCRIPTION: NORMAL
T PALLIDUM AB SER QL IA: NONREACTIVE
WBC OTHER # BLD: 9.1 K/UL (ref 3.5–11.3)

## 2024-06-26 LAB
MICROORGANISM SPEC CULT: NORMAL
SERVICE CMNT-IMP: NORMAL
SPECIMEN DESCRIPTION: NORMAL

## 2024-07-02 SDOH — ECONOMIC STABILITY: INCOME INSECURITY: HOW HARD IS IT FOR YOU TO PAY FOR THE VERY BASICS LIKE FOOD, HOUSING, MEDICAL CARE, AND HEATING?: NOT VERY HARD

## 2024-07-02 SDOH — ECONOMIC STABILITY: FOOD INSECURITY: WITHIN THE PAST 12 MONTHS, YOU WORRIED THAT YOUR FOOD WOULD RUN OUT BEFORE YOU GOT MONEY TO BUY MORE.: NEVER TRUE

## 2024-07-02 SDOH — ECONOMIC STABILITY: TRANSPORTATION INSECURITY
IN THE PAST 12 MONTHS, HAS LACK OF TRANSPORTATION KEPT YOU FROM MEETINGS, WORK, OR FROM GETTING THINGS NEEDED FOR DAILY LIVING?: NO

## 2024-07-02 SDOH — ECONOMIC STABILITY: FOOD INSECURITY: WITHIN THE PAST 12 MONTHS, THE FOOD YOU BOUGHT JUST DIDN'T LAST AND YOU DIDN'T HAVE MONEY TO GET MORE.: NEVER TRUE

## 2024-07-03 ENCOUNTER — ROUTINE PRENATAL (OUTPATIENT)
Dept: OBGYN | Age: 29
End: 2024-07-03

## 2024-07-03 VITALS
SYSTOLIC BLOOD PRESSURE: 120 MMHG | HEART RATE: 90 BPM | BODY MASS INDEX: 34.76 KG/M2 | WEIGHT: 228.6 LBS | DIASTOLIC BLOOD PRESSURE: 78 MMHG

## 2024-07-03 DIAGNOSIS — Z34.81 ENCOUNTER FOR SUPERVISION OF OTHER NORMAL PREGNANCY IN FIRST TRIMESTER: ICD-10-CM

## 2024-07-03 DIAGNOSIS — Z3A.08 8 WEEKS GESTATION OF PREGNANCY: Primary | ICD-10-CM

## 2024-07-03 PROBLEM — S92.242D: Status: RESOLVED | Noted: 2023-01-05 | Resolved: 2024-07-03

## 2024-07-03 PROBLEM — S92.309A CLOSED FRACTURE OF METATARSAL BONE: Status: RESOLVED | Noted: 2023-01-05 | Resolved: 2024-07-03

## 2024-07-03 PROCEDURE — 0502F SUBSEQUENT PRENATAL CARE: CPT | Performed by: ADVANCED PRACTICE MIDWIFE

## 2024-07-03 ASSESSMENT — PATIENT HEALTH QUESTIONNAIRE - PHQ9
SUM OF ALL RESPONSES TO PHQ QUESTIONS 1-9: 0
2. FEELING DOWN, DEPRESSED OR HOPELESS: NOT AT ALL
SUM OF ALL RESPONSES TO PHQ9 QUESTIONS 1 & 2: 0
1. LITTLE INTEREST OR PLEASURE IN DOING THINGS: NOT AT ALL
SUM OF ALL RESPONSES TO PHQ QUESTIONS 1-9: 0

## 2024-07-03 NOTE — PROGRESS NOTES
Anay Mendez is here at 8w0d for:    Chief Complaint   Patient presents with    Routine Prenatal Visit     F/U in house dating u/s. TBE-PAP at next visit. Pt states no issues or concerns        Estimated Due Date: Estimated Date of Delivery: 25    OB History    Para Term  AB Living   3 2 2 0 0 2   SAB IAB Ectopic Molar Multiple Live Births   0 0 0   0 2      # Outcome Date GA Lbr Aidan/2nd Weight Sex Delivery Anes PTL Lv   3 Current            2 Term 23 40w2d  3.447 kg (7 lb 9.6 oz) F Vag-Spont EPI N WALT      Complications: Nuchal cord, single gestation   1 Term 21 39w0d 11:52 / 00:24 3.737 kg (8 lb 3.8 oz) M Vag-Spont EPI N WALT        Past Medical History:   Diagnosis Date    Allergic rhinitis     Asthma     Concussion     Conjunctivitis     allergic       Past Surgical History:   Procedure Laterality Date    FOOT SURGERY  2021    FRACTURE SURGERY  21    Fell down stairs and broke my foot       Social History     Tobacco Use   Smoking Status Never   Smokeless Tobacco Never        Social History     Substance and Sexual Activity   Alcohol Use Not Currently               HPI: Patient here today for new OB visit.  Patient denies any other concerns at this    Yes PT denies fever, chills, nausea and vomiting       Vitals:  Estimated body mass index is 34.76 kg/m² as calculated from the following:    Height as of 23: 1.727 m (5' 8\").    Weight as of this encounter: 103.7 kg (228 lb 9.6 oz).  BP: 120/78  Weight - Scale: 103.7 kg (228 lb 9.6 oz)  Pulse: 90  Patient Position: Sitting  Albumin: Negative  Glucose: Negative  Fetal HR: 172  Movement: Absent           Abdomen: soft    Results reviewed today:     7/3/2024  4:13 PM EDT      8.0 WK IUP  CL:3.3cm  HR:172bpm  RT. OVARY:seen, wnl  LT. OVARY:seen, wnl  Small SCB visualized adjacent to gest sac devante- 2.0cm x 2.1cm x 1.4cm             ASSESSMENT & Plan    Diagnosis Orders   1. 8 weeks gestation of pregnancy        2.

## 2024-07-30 ENCOUNTER — ROUTINE PRENATAL (OUTPATIENT)
Dept: OBGYN | Age: 29
End: 2024-07-30

## 2024-07-30 ENCOUNTER — HOSPITAL ENCOUNTER (OUTPATIENT)
Age: 29
Setting detail: SPECIMEN
Discharge: HOME | End: 2024-07-30
Payer: COMMERCIAL

## 2024-07-30 VITALS
HEART RATE: 97 BPM | DIASTOLIC BLOOD PRESSURE: 80 MMHG | WEIGHT: 232 LBS | SYSTOLIC BLOOD PRESSURE: 120 MMHG | BODY MASS INDEX: 35.28 KG/M2

## 2024-07-30 DIAGNOSIS — O26.893 HEARTBURN DURING PREGNANCY IN THIRD TRIMESTER: ICD-10-CM

## 2024-07-30 DIAGNOSIS — Z3A.11 11 WEEKS GESTATION OF PREGNANCY: ICD-10-CM

## 2024-07-30 DIAGNOSIS — O21.9 NAUSEA AND VOMITING DURING PREGNANCY: ICD-10-CM

## 2024-07-30 DIAGNOSIS — Z12.4 SCREENING FOR CERVICAL CANCER: ICD-10-CM

## 2024-07-30 DIAGNOSIS — R12 HEARTBURN DURING PREGNANCY IN THIRD TRIMESTER: ICD-10-CM

## 2024-07-30 DIAGNOSIS — K59.00 CONSTIPATION DURING PREGNANCY, ANTEPARTUM: ICD-10-CM

## 2024-07-30 DIAGNOSIS — O99.619 CONSTIPATION DURING PREGNANCY, ANTEPARTUM: ICD-10-CM

## 2024-07-30 DIAGNOSIS — Z3A.11 11 WEEKS GESTATION OF PREGNANCY: Primary | ICD-10-CM

## 2024-07-30 PROCEDURE — G0145 SCR C/V CYTO,THINLAYER,RESCR: HCPCS

## 2024-07-30 PROCEDURE — 0502F SUBSEQUENT PRENATAL CARE: CPT | Performed by: ADVANCED PRACTICE MIDWIFE

## 2024-07-30 RX ORDER — ONDANSETRON 4 MG/1
4 TABLET, FILM COATED ORAL 3 TIMES DAILY PRN
Qty: 30 TABLET | Refills: 0 | Status: SHIPPED | OUTPATIENT
Start: 2024-07-30

## 2024-07-30 RX ORDER — OMEPRAZOLE 20 MG/1
20 CAPSULE, DELAYED RELEASE ORAL
Qty: 90 CAPSULE | Refills: 2 | Status: SHIPPED | OUTPATIENT
Start: 2024-07-30

## 2024-07-30 RX ORDER — DOCUSATE SODIUM 100 MG/1
100 CAPSULE, LIQUID FILLED ORAL 2 TIMES DAILY
Qty: 180 CAPSULE | Refills: 2 | Status: SHIPPED | OUTPATIENT
Start: 2024-07-30 | End: 2025-04-26

## 2024-07-30 NOTE — PROGRESS NOTES
help when you need it.  Try to avoid things that cause you stress.  Seek out things that relieve stress, such as breathing exercises or yoga.     How to get exercise   If you don't usually exercise, start slowly. Short walks may be a good choice.  Try to be active 30 minutes a day, at least 5 days a week.  Avoid activities where you're more likely to fall.  Use light weights to reduce stress on your joints.     How to stay at a healthy weight for you   Talk to your doctor or midwife about how much weight you should gain.  It's generally best to gain:  About 28 to 40 pounds if you're underweight.  About 25 to 35 pounds if you're at a healthy weight.  About 15 to 25 pounds if you're overweight.  About 11 to 20 pounds if you're very overweight (obese).  Follow-up care is a key part of your treatment and safety. Be sure to make and go to all appointments, and call your doctor if you are having problems. It's also a good idea to know your test results and keep a list of the medicines you take.  Where can you learn more?  Go to https://www.[employer].net/patientEd and enter I453 to learn more about \"Weeks 14 to 18 of Your Pregnancy: Care Instructions.\"  Current as of: July 10, 2023  Content Version: 14.1  © 8082-6563 [employer].   Care instructions adapted under license by [employer]. If you have questions about a medical condition or this instruction, always ask your healthcare professional. [employer] disclaims any warranty or liability for your use of this information.                      KIMBERLY Pritchard CNM,7/30/2024 3:45 PM

## 2024-08-29 ENCOUNTER — ROUTINE PRENATAL (OUTPATIENT)
Dept: OBGYN | Age: 29
End: 2024-08-29

## 2024-08-29 VITALS
BODY MASS INDEX: 35.12 KG/M2 | HEART RATE: 94 BPM | WEIGHT: 231 LBS | SYSTOLIC BLOOD PRESSURE: 118 MMHG | DIASTOLIC BLOOD PRESSURE: 78 MMHG

## 2024-08-29 DIAGNOSIS — Z3A.16 16 WEEKS GESTATION OF PREGNANCY: Primary | ICD-10-CM

## 2024-08-29 DIAGNOSIS — Z34.82 ENCOUNTER FOR SUPERVISION OF OTHER NORMAL PREGNANCY IN SECOND TRIMESTER: ICD-10-CM

## 2024-08-29 PROCEDURE — 0502F SUBSEQUENT PRENATAL CARE: CPT | Performed by: ADVANCED PRACTICE MIDWIFE

## 2024-08-29 NOTE — PROGRESS NOTES
Anay JOSI Vanessa is here at 16w1d for:    Chief Complaint   Patient presents with    Routine Prenatal Visit     Pt states no issues or concerns.        Estimated Due Date: Estimated Date of Delivery: 25    OB History    Para Term  AB Living   3 2 2 0 0 2   SAB IAB Ectopic Molar Multiple Live Births   0 0 0   0 2      # Outcome Date GA Lbr Aidan/2nd Weight Sex Type Anes PTL Lv   3 Current            2 Term 23 40w2d  3.447 kg (7 lb 9.6 oz) F Vag-Spont EPI N WALT      Complications: Nuchal cord, single gestation   1 Term 21 39w0d 11:52 / 00:24 3.737 kg (8 lb 3.8 oz) M Vag-Spont EPI N WALT        Past Medical History:   Diagnosis Date    Allergic rhinitis     Asthma     Concussion     Conjunctivitis     allergic       Past Surgical History:   Procedure Laterality Date    FOOT SURGERY  2021    FRACTURE SURGERY  21    Fell down stairs and broke my foot       Social History     Tobacco Use   Smoking Status Never   Smokeless Tobacco Never        Social History     Substance and Sexual Activity   Alcohol Use Not Currently               HPI: Patient doing well today.  Patient denies any other concerns.  Patient states she is unsure if she wants to do a fetal echo with this pregnancy.    Yes PT denies fever, chills, nausea and vomiting       Vitals:  Estimated body mass index is 35.12 kg/m² as calculated from the following:    Height as of 23: 1.727 m (5' 8\").    Weight as of this encounter: 104.8 kg (231 lb).  BP: 118/78  Weight - Scale: 104.8 kg (231 lb)  Pulse: 94  Patient Position: Sitting  Albumin: Negative  Glucose: Negative  Fetal HR: 154  Movement: Absent           Abdomen: soft    Results reviewed today:    No results found for this visit on 24.        ASSESSMENT & Plan    Diagnosis Orders   1. 16 weeks gestation of pregnancy        2. Encounter for supervision of other normal pregnancy in second trimester                  I am having Anay Mendez maintain her  \"Weeks 14 to 18 of Your Pregnancy: Care Instructions.\"  Current as of: July 10, 2023  Content Version: 14.1  © 7884-1128 [employer].   Care instructions adapted under license by [employer]. If you have questions about a medical condition or this instruction, always ask your healthcare professional. [employer] disclaims any warranty or liability for your use of this information.                      KIMBERLY Pritchard - KARIN,8/29/2024 4:17 PM

## 2024-09-02 DIAGNOSIS — O21.9 NAUSEA AND VOMITING DURING PREGNANCY: ICD-10-CM

## 2024-09-03 RX ORDER — ONDANSETRON 4 MG/1
4 TABLET, FILM COATED ORAL 3 TIMES DAILY PRN
Qty: 30 TABLET | Refills: 0 | Status: SHIPPED | OUTPATIENT
Start: 2024-09-03

## 2024-09-03 NOTE — TELEPHONE ENCOUNTER
Pt has up coming apt for 20 week ob usn 9/28/24. Pt is currently as of today 16 weeks 6 days gestation.

## 2024-09-25 ENCOUNTER — ROUTINE PRENATAL (OUTPATIENT)
Dept: OBGYN | Age: 29
End: 2024-09-25

## 2024-09-25 VITALS
BODY MASS INDEX: 34.42 KG/M2 | WEIGHT: 226.4 LBS | DIASTOLIC BLOOD PRESSURE: 80 MMHG | SYSTOLIC BLOOD PRESSURE: 134 MMHG | HEART RATE: 90 BPM

## 2024-09-25 DIAGNOSIS — Z36.89 SCREENING, ANTENATAL, FOR FETAL ANATOMIC SURVEY: ICD-10-CM

## 2024-09-25 DIAGNOSIS — Z3A.20 20 WEEKS GESTATION OF PREGNANCY: Primary | ICD-10-CM

## 2024-09-25 PROCEDURE — 0502F SUBSEQUENT PRENATAL CARE: CPT | Performed by: ADVANCED PRACTICE MIDWIFE

## 2024-10-24 ENCOUNTER — ROUTINE PRENATAL (OUTPATIENT)
Dept: OBGYN | Age: 29
End: 2024-10-24

## 2024-10-24 VITALS
HEART RATE: 88 BPM | WEIGHT: 230 LBS | SYSTOLIC BLOOD PRESSURE: 124 MMHG | DIASTOLIC BLOOD PRESSURE: 80 MMHG | BODY MASS INDEX: 34.97 KG/M2

## 2024-10-24 DIAGNOSIS — R12 HEARTBURN DURING PREGNANCY IN SECOND TRIMESTER: ICD-10-CM

## 2024-10-24 DIAGNOSIS — O26.892 HEARTBURN DURING PREGNANCY IN SECOND TRIMESTER: ICD-10-CM

## 2024-10-24 DIAGNOSIS — Z36.3 ANTENATAL SCREENING FOR MALFORMATION USING ULTRASONICS: ICD-10-CM

## 2024-10-24 DIAGNOSIS — Z3A.24 24 WEEKS GESTATION OF PREGNANCY: Primary | ICD-10-CM

## 2024-10-24 NOTE — PROGRESS NOTES
Pt was unable to void   
  2.  screening for malformation using ultrasonics        3. Heartburn during pregnancy in second trimester                  I am having Anay Mendez maintain her Prenatal Vit-Fe Fumarate-FA (PRENATAL 19 PO), (Doxylamine Succinate, Sleep, (UNISOM PO)), omeprazole, docusate sodium, and ondansetron.    Return for Keep next appt.    There are no Patient Instructions on file for this visit.             KIMBERLY Pritchard CNM,10/24/2024 10:16 AM

## 2024-11-13 ENCOUNTER — ROUTINE PRENATAL (OUTPATIENT)
Dept: OBGYN | Age: 29
End: 2024-11-13
Payer: COMMERCIAL

## 2024-11-13 VITALS
WEIGHT: 235 LBS | SYSTOLIC BLOOD PRESSURE: 126 MMHG | DIASTOLIC BLOOD PRESSURE: 80 MMHG | HEART RATE: 100 BPM | BODY MASS INDEX: 35.73 KG/M2

## 2024-11-13 DIAGNOSIS — Z3A.27 27 WEEKS GESTATION OF PREGNANCY: Primary | ICD-10-CM

## 2024-11-13 DIAGNOSIS — R73.09 ABNORMAL GTT (GLUCOSE TOLERANCE TEST): ICD-10-CM

## 2024-11-13 DIAGNOSIS — J01.00 ACUTE MAXILLARY SINUSITIS, RECURRENCE NOT SPECIFIED: ICD-10-CM

## 2024-11-13 DIAGNOSIS — O99.810 IMPAIRED GLUCOSE IN PREGNANCY, ANTEPARTUM: ICD-10-CM

## 2024-11-13 LAB
GLUCOSE 60 MIN, POC: 150
HGB, POC: 12.2 G/DL

## 2024-11-13 PROCEDURE — 0502F SUBSEQUENT PRENATAL CARE: CPT | Performed by: ADVANCED PRACTICE MIDWIFE

## 2024-11-13 PROCEDURE — 85018 HEMOGLOBIN: CPT | Performed by: ADVANCED PRACTICE MIDWIFE

## 2024-11-13 PROCEDURE — 82950 GLUCOSE TEST: CPT | Performed by: ADVANCED PRACTICE MIDWIFE

## 2024-11-13 RX ORDER — AZITHROMYCIN 250 MG/1
TABLET, FILM COATED ORAL
Qty: 1 PACKET | Refills: 0 | Status: SHIPPED | OUTPATIENT
Start: 2024-11-13

## 2024-11-13 NOTE — PROGRESS NOTES
Anay Mendez is here at 27w0d for:    Chief Complaint   Patient presents with    Routine Prenatal Visit     GTT,  pt thinks she is getting a sinus infection        Estimated Due Date: Estimated Date of Delivery: 25    OB History    Para Term  AB Living   3 2 2 0 0 2   SAB IAB Ectopic Molar Multiple Live Births   0 0 0   0 2      # Outcome Date GA Lbr Aidan/2nd Weight Sex Type Anes PTL Lv   3 Current            2 Term 23 40w2d  3.447 kg (7 lb 9.6 oz) F Vag-Spont EPI N WALT      Complications: Nuchal cord, single gestation   1 Term 21 39w0d 11:52 / 00:24 3.737 kg (8 lb 3.8 oz) M Vag-Spont EPI N WALT        Past Medical History:   Diagnosis Date    Allergic rhinitis     Asthma     Concussion     Conjunctivitis     allergic       Past Surgical History:   Procedure Laterality Date    FOOT SURGERY  2021    FRACTURE SURGERY  21    Fell down stairs and broke my foot       Social History     Tobacco Use   Smoking Status Never   Smokeless Tobacco Never        Social History     Substance and Sexual Activity   Alcohol Use Not Currently               HPI: Patient here today for routine prenatal visit with gtt. Pt states she has been having worsening sinus pressure and drainage since . States drainage is thick and purulent.     Yes PT denies fever, chills, nausea and vomiting       Vitals:  Estimated body mass index is 35.73 kg/m² as calculated from the following:    Height as of 23: 1.727 m (5' 8\").    Weight as of this encounter: 106.6 kg (235 lb).  BP: 126/80  Weight - Scale: 106.6 kg (235 lb)  Pulse: 100  Patient Position: Sitting  Albumin: 1+  Glucose: Negative  Fundal Height (cm): 28 cm  Fetal HR: 156  Movement: Present         Sinus: Maxillary sinuses tender to palpation.  Abdomen: soft    Results reviewed today:    Results for orders placed or performed in visit on 24   POCT hemoglobin   Result Value Ref Range    Hemoglobin 12.2 g/dL   POCT Glucose Tolerance 1

## 2024-11-14 ENCOUNTER — HOSPITAL ENCOUNTER (OUTPATIENT)
Age: 29
Discharge: HOME | End: 2024-11-14

## 2024-11-14 DIAGNOSIS — R73.09 ABNORMAL GTT (GLUCOSE TOLERANCE TEST): ICD-10-CM

## 2024-11-14 DIAGNOSIS — Z3A.27 27 WEEKS GESTATION OF PREGNANCY: ICD-10-CM

## 2024-11-14 LAB
AMOUNT GLUCOSE GIVEN: 100 G
COLLECT TIME, 1HR GLUCOSE: 1010
COLLECT TIME, 3HR GLUCOSE: 1210
COLLECT TIME, FASTING GLUCOSE: 905
GLUCOSE 2H P 100 G GLC PO SERPL-MCNC: 90 MG/DL
GLUCOSE 3H P 100 G GLC PO SERPL-MCNC: 147 MG/DL
GLUCOSE TOLERANCE TEST 3 HOUR: 46 MG/DL

## 2024-12-04 ENCOUNTER — ROUTINE PRENATAL (OUTPATIENT)
Dept: OBGYN | Age: 29
End: 2024-12-04

## 2024-12-04 VITALS
SYSTOLIC BLOOD PRESSURE: 120 MMHG | BODY MASS INDEX: 36.4 KG/M2 | DIASTOLIC BLOOD PRESSURE: 80 MMHG | HEART RATE: 102 BPM | WEIGHT: 239.4 LBS

## 2024-12-04 DIAGNOSIS — Z3A.30 30 WEEKS GESTATION OF PREGNANCY: Primary | ICD-10-CM

## 2024-12-04 DIAGNOSIS — Z23 NEED FOR TDAP VACCINATION: ICD-10-CM

## 2024-12-04 PROCEDURE — 0502F SUBSEQUENT PRENATAL CARE: CPT | Performed by: ADVANCED PRACTICE MIDWIFE

## 2024-12-04 NOTE — PROGRESS NOTES
Guillain-Barré syndrome (GBS), have been reported after RSV vaccination in clinical trials of older adults. It is unclear whether the vaccine caused these events.     birth and high blood pressure during pregnancy, including pre-eclampsia, have been reported among pregnant people who received RSV vaccine during clinical trials. It is unclear whether these events were caused by the vaccine.    People sometimes faint after medical procedures, including vaccination. Tell your provider if you feel dizzy or have vision changes or ringing in the ears.    As with any medicine, there is a very remote chance of a vaccine causing a severe allergic reaction, other serious injury, or death.    5. What if there is a serious problem?    An allergic reaction could occur after the vaccinated person leaves the clinic. If you see signs of a severe allergic reaction (hives, swelling of the face and throat, difficulty breathing, a fast heartbeat, dizziness, or weakness), call  and get the person to the nearest hospital.    For other signs that concern you, call your health care provider.    Adverse reactions should be reported to the Vaccine Adverse Event Reporting System (VAERS). Your health care provider will usually file this report, or you can do it yourself. Visit the VAERS website at www.vaers.[facility].gov or call 1-374.974.6615. VAERS is only for reporting reactions, and VAERS staff members do not give medical advice.    6. How can I learn more?    Ask your health care provider.   Call your local or state health department.  Visit the website of the Food and Drug Administration (FDA) for vaccine package inserts and additional information at www.fda.gov/vaccines-blood-biologics/vaccines  Contact the Centers for Disease Control and Prevention (CDC):  Call 1-817.430.5414 (5-038-UXZ-INFO) or  Visit CDC’s website at www.cdc.gov/vaccines.    Vaccine Information Statement   RSV Vaccine   10/19/2023    [facility]

## 2024-12-09 ENCOUNTER — TELEPHONE (OUTPATIENT)
Dept: OBGYN | Age: 29
End: 2024-12-09

## 2024-12-09 ENCOUNTER — OFFICE VISIT (OUTPATIENT)
Dept: PRIMARY CARE CLINIC | Age: 29
End: 2024-12-09

## 2024-12-09 VITALS
BODY MASS INDEX: 36.61 KG/M2 | OXYGEN SATURATION: 99 % | DIASTOLIC BLOOD PRESSURE: 70 MMHG | SYSTOLIC BLOOD PRESSURE: 122 MMHG | WEIGHT: 240.8 LBS | HEART RATE: 99 BPM

## 2024-12-09 DIAGNOSIS — S81.812A LACERATION OF LEFT LOWER EXTREMITY, INITIAL ENCOUNTER: Primary | ICD-10-CM

## 2024-12-09 RX ORDER — LIDOCAINE HCL/EPINEPHRINE/PF 2%-1:200K
5 VIAL (ML) INJECTION ONCE
Qty: 5 ML | Refills: 0 | Status: CANCELLED | OUTPATIENT
Start: 2024-12-09 | End: 2024-12-09

## 2024-12-09 RX ADMIN — LIDOCAINE HYDROCHLORIDE AND EPINEPHRINE BITARTRATE 5 ML: 20; .01 INJECTION, SOLUTION SUBCUTANEOUS at 10:36

## 2024-12-09 ASSESSMENT — ENCOUNTER SYMPTOMS
DIARRHEA: 0
COLOR CHANGE: 1

## 2024-12-09 NOTE — PROGRESS NOTES
2024     Anay Mendez (:  1995) is a 29 y.o. female, here for evaluation of the following medical concerns:    Procedure note:    Anay is an alert and oriented 31 week pregnant well appearing female seen today after a fall at home  She tripped on her sidewalk  She has an irregular shaped laceration to the left anterior gaiter region  Weight bearing status and ROM to left leg intact  No signs or symptoms of infection  Anay reports she is up to date on her tetanus      Prior to Visit Medications    Medication Sig Taking? Authorizing Provider   ondansetron (ZOFRAN) 4 MG tablet Take 1 tablet by mouth 3 times daily as needed for Nausea or Vomiting Yes Yadira Traore APRN - CNM   omeprazole (PRILOSEC) 20 MG delayed release capsule Take 1 capsule by mouth every morning (before breakfast) Yes Yadira Traore APRN - CNM   Prenatal Vit-Fe Fumarate-FA (PRENATAL 19 PO) Take by mouth Yes Provider, MD Merari   docusate sodium (COLACE) 100 MG capsule Take 1 capsule by mouth 2 times daily  Patient not taking: Reported on 2024  Yadira Traore APRN - CNM        Social History     Tobacco Use    Smoking status: Never    Smokeless tobacco: Never   Substance Use Topics    Alcohol use: Not Currently        Vitals:    24 1019   BP: 122/70   Pulse: 99   SpO2: 99%   Weight: 109.2 kg (240 lb 12.8 oz)     Estimated body mass index is 36.61 kg/m² as calculated from the following:    Height as of 23: 1.727 m (5' 8\").    Weight as of this encounter: 109.2 kg (240 lb 12.8 oz).    Review of Systems   Constitutional:  Negative for chills, diaphoresis and fatigue.   Gastrointestinal:  Negative for diarrhea.   Musculoskeletal:  Negative for gait problem.   Skin:  Positive for color change and wound.   Neurological:  Negative for dizziness and light-headedness.   Psychiatric/Behavioral:  The patient is not nervous/anxious.        Physical Exam  Vitals and nursing note reviewed.

## 2024-12-09 NOTE — TELEPHONE ENCOUNTER
The patient called this morning to let the office know she is 31wks pregnant and fell while holding her daughter. She said the baby is fine, she did not fall on her abdomen, just scrapped her leg up pretty good. She wanted to know if we could clean it up for her. Writer spoke with provider Velvet Traore in regards to this, she did advise writer we do not have the supplies in office to do this and suggested the patient call her PCP, ER, or urgent care. Velvet also advised writer to tell patient if she has an increase in contractions, fluid leaking, or bleeding to call the office or goo to L&D if we are closed. The patient did verbalize understanding.

## 2024-12-10 RX ORDER — LIDOCAINE HYDROCHLORIDE AND EPINEPHRINE BITARTRATE 20; .01 MG/ML; MG/ML
5 INJECTION, SOLUTION SUBCUTANEOUS ONCE
Status: COMPLETED | OUTPATIENT
Start: 2024-12-09 | End: 2024-12-09

## 2024-12-11 ENCOUNTER — PATIENT MESSAGE (OUTPATIENT)
Dept: OBGYN | Age: 29
End: 2024-12-11

## 2024-12-11 DIAGNOSIS — S81.812A LACERATION OF LEFT LOWER EXTREMITY, INITIAL ENCOUNTER: ICD-10-CM

## 2024-12-11 DIAGNOSIS — L08.9 WOUND INFECTION: Primary | ICD-10-CM

## 2024-12-11 DIAGNOSIS — T14.8XXA WOUND INFECTION: Primary | ICD-10-CM

## 2024-12-12 DIAGNOSIS — S81.812A LACERATION OF LEFT LOWER EXTREMITY, INITIAL ENCOUNTER: Primary | ICD-10-CM

## 2024-12-12 RX ORDER — MUPIROCIN 20 MG/G
OINTMENT TOPICAL
Qty: 30 G | Refills: 2 | Status: SHIPPED | OUTPATIENT
Start: 2024-12-12 | End: 2024-12-19

## 2024-12-18 ENCOUNTER — ROUTINE PRENATAL (OUTPATIENT)
Dept: OBGYN | Age: 29
End: 2024-12-18

## 2024-12-18 VITALS
DIASTOLIC BLOOD PRESSURE: 80 MMHG | HEART RATE: 91 BPM | SYSTOLIC BLOOD PRESSURE: 124 MMHG | BODY MASS INDEX: 36.8 KG/M2 | WEIGHT: 242 LBS

## 2024-12-18 DIAGNOSIS — Z23 NEED FOR INFLUENZA VACCINATION: ICD-10-CM

## 2024-12-18 DIAGNOSIS — Z3A.32 32 WEEKS GESTATION OF PREGNANCY: Primary | ICD-10-CM

## 2024-12-18 DIAGNOSIS — Z23 NEED FOR TDAP VACCINATION: ICD-10-CM

## 2024-12-18 PROCEDURE — 0502F SUBSEQUENT PRENATAL CARE: CPT | Performed by: ADVANCED PRACTICE MIDWIFE

## 2024-12-18 NOTE — PROGRESS NOTES
Anay Mendez is here at 32w0d for:    Chief Complaint   Patient presents with    Routine Prenatal Visit     Pt c/o a sinus infection, pt would like to discuss the RSV vaccine     Medication Refill     Zofran        Estimated Due Date: Estimated Date of Delivery: 25    OB History    Para Term  AB Living   3 2 2 0 0 2   SAB IAB Ectopic Molar Multiple Live Births   0 0 0   0 2      # Outcome Date GA Lbr Aidan/2nd Weight Sex Type Anes PTL Lv   3 Current            2 Term 23 40w2d  3.447 kg (7 lb 9.6 oz) F Vag-Spont EPI N WALT      Complications: Nuchal cord, single gestation   1 Term 21 39w0d 11:52 / 00:24 3.737 kg (8 lb 3.8 oz) M Vag-Spont EPI N WALT        Past Medical History:   Diagnosis Date    Allergic rhinitis     Asthma     Concussion     Conjunctivitis     allergic       Past Surgical History:   Procedure Laterality Date    FOOT SURGERY  2021    FRACTURE SURGERY  21    Fell down stairs and broke my foot       Social History     Tobacco Use   Smoking Status Never   Smokeless Tobacco Never        Social History     Substance and Sexual Activity   Alcohol Use Not Currently               HPI: PT doing well today with good fetal movements - pt states she does have a sinus infection and is questioning the RSV vaccine    Yes PT denies fever, chills, nausea and vomiting       Vitals:  Estimated body mass index is 36.8 kg/m² as calculated from the following:    Height as of 23: 1.727 m (5' 8\").    Weight as of this encounter: 109.8 kg (242 lb).  BP: 124/80  Weight - Scale: 109.8 kg (242 lb)  Pulse: 91  Patient Position: Sitting  Albumin: Trace  Glucose: Negative  Fundal Height (cm): 34 cm  Fetal HR: 148  Movement: Present           Abdomen: soft    Results reviewed today:    No results found for this visit on 24.        ASSESSMENT & Plan    Diagnosis Orders   1. 32 weeks gestation of pregnancy        2. Need for Tdap vaccination        3. Need for influenza

## 2024-12-18 NOTE — PATIENT INSTRUCTIONS
VAERS website at www.vaers.[facility].gov or call 1-913.751.1856. VAERS is only for reporting reactions, and VAERS staff members do not give medical advice.  The National Vaccine Injury Compensation Program  The National Vaccine Injury Compensation Program (VICP) is a federal program that was created to compensate people who may have been injured by certain vaccines. Claims regarding alleged injury or death due to vaccination have a time limit for filing, which may be as short as two years. Visit the VICP website at www.[facility].gov/vaccinecompensation or call 1-696.409.8011 to learn about the program and about filing a claim.  How can I learn more?  Ask your health care provider.  Call your local or state health department.  Visit the website of the Food and Drug Administration (FDA) for vaccine package inserts and additional information at www.fda.gov/vaccines-blood-biologics/vaccines.  Contact the Centers for Disease Control and Prevention (CDC):  Call 1-925.773.7228 (5-598-MBC-INFO) or  Visit CDC's website at www.cdc.gov/vaccines.  Vaccine Information Statement  Tdap (Tetanus, Diphtheria, Pertussis) Vaccine  8/6/2021  42 U.S.C. § 300aa-26  Department of Health and Human Services  Centers for Disease Control and Prevention  Many vaccine information statements are available in [place] and other languages. See www.immunize.org/vis  Hojas de información sobre vacunas están disponibles en español y en muchos otros idiomas. Visite www.immunize.org/vis  Care instructions adapted under license by [employer]. If you have questions about a medical condition or this instruction, always ask your healthcare professional. [employer] disclaims any warranty or liability for your use of this information.

## 2024-12-22 NOTE — PROGRESS NOTES
and nursing note reviewed.   Constitutional:       General: She is not in acute distress.     Appearance: She is not ill-appearing, toxic-appearing or diaphoretic.   Skin:     Findings: Erythema and wound present.          Neurological:      Mental Status: She is alert.   Psychiatric:         Judgment: Judgment normal.       ASSESSMENT/PLAN:  1. Laceration of left lower extremity, initial encounter  - Healing  - If necrosis is slow to heal, consider Santyl    Return if symptoms worsen or fail to improve.    If symptoms fail to improve or worsen rapidly, Anay is encouraged to report to an ED for prompt evaluation and treatment.    An electronic signature was used to authenticate this note.    --KIMBERLY Blandon - CNP on 12/23/2024 at 7:28 PM

## 2024-12-23 ENCOUNTER — OFFICE VISIT (OUTPATIENT)
Dept: PRIMARY CARE CLINIC | Age: 29
End: 2024-12-23
Payer: COMMERCIAL

## 2024-12-23 VITALS
HEART RATE: 101 BPM | WEIGHT: 242.4 LBS | BODY MASS INDEX: 36.74 KG/M2 | OXYGEN SATURATION: 98 % | DIASTOLIC BLOOD PRESSURE: 68 MMHG | SYSTOLIC BLOOD PRESSURE: 104 MMHG | HEIGHT: 68 IN

## 2024-12-23 DIAGNOSIS — S81.812A LACERATION OF LEFT LOWER EXTREMITY, INITIAL ENCOUNTER: Primary | ICD-10-CM

## 2024-12-23 PROCEDURE — 99213 OFFICE O/P EST LOW 20 MIN: CPT | Performed by: NURSE PRACTITIONER

## 2024-12-23 SDOH — ECONOMIC STABILITY: FOOD INSECURITY: WITHIN THE PAST 12 MONTHS, THE FOOD YOU BOUGHT JUST DIDN'T LAST AND YOU DIDN'T HAVE MONEY TO GET MORE.: NEVER TRUE

## 2024-12-23 SDOH — ECONOMIC STABILITY: INCOME INSECURITY: HOW HARD IS IT FOR YOU TO PAY FOR THE VERY BASICS LIKE FOOD, HOUSING, MEDICAL CARE, AND HEATING?: NOT HARD AT ALL

## 2024-12-23 SDOH — ECONOMIC STABILITY: FOOD INSECURITY: WITHIN THE PAST 12 MONTHS, YOU WORRIED THAT YOUR FOOD WOULD RUN OUT BEFORE YOU GOT MONEY TO BUY MORE.: NEVER TRUE

## 2024-12-23 ASSESSMENT — PATIENT HEALTH QUESTIONNAIRE - PHQ9
SUM OF ALL RESPONSES TO PHQ QUESTIONS 1-9: 0
SUM OF ALL RESPONSES TO PHQ9 QUESTIONS 1 & 2: 0
SUM OF ALL RESPONSES TO PHQ QUESTIONS 1-9: 0
2. FEELING DOWN, DEPRESSED OR HOPELESS: NOT AT ALL
1. LITTLE INTEREST OR PLEASURE IN DOING THINGS: NOT AT ALL

## 2024-12-23 ASSESSMENT — ENCOUNTER SYMPTOMS
SHORTNESS OF BREATH: 0
COUGH: 0
WHEEZING: 0
NAUSEA: 0
COLOR CHANGE: 1
VOMITING: 0
CHEST TIGHTNESS: 0

## 2024-12-30 ENCOUNTER — ROUTINE PRENATAL (OUTPATIENT)
Dept: OBGYN | Age: 29
End: 2024-12-30

## 2024-12-30 VITALS
HEART RATE: 91 BPM | SYSTOLIC BLOOD PRESSURE: 122 MMHG | BODY MASS INDEX: 37.34 KG/M2 | DIASTOLIC BLOOD PRESSURE: 78 MMHG | WEIGHT: 242 LBS

## 2024-12-30 DIAGNOSIS — Z3A.33 33 WEEKS GESTATION OF PREGNANCY: Primary | ICD-10-CM

## 2024-12-30 DIAGNOSIS — Z34.83 ENCOUNTER FOR SUPERVISION OF OTHER NORMAL PREGNANCY IN THIRD TRIMESTER: ICD-10-CM

## 2024-12-30 PROCEDURE — 0502F SUBSEQUENT PRENATAL CARE: CPT | Performed by: ADVANCED PRACTICE MIDWIFE

## 2024-12-30 NOTE — PROGRESS NOTES
Anay Mendez is here at 33w5d for:    Chief Complaint   Patient presents with    Routine Prenatal Visit     Pt here for 33 wk OB. Pt states no complaints or concerns.        Estimated Due Date: Estimated Date of Delivery: 25    OB History    Para Term  AB Living   3 2 2 0 0 2   SAB IAB Ectopic Molar Multiple Live Births   0 0 0   0 2      # Outcome Date GA Lbr Aidan/2nd Weight Sex Type Anes PTL Lv   3 Current            2 Term 23 40w2d  3.447 kg (7 lb 9.6 oz) F Vag-Spont EPI N WALT      Complications: Nuchal cord, single gestation   1 Term 21 39w0d 11:52 / 00:24 3.737 kg (8 lb 3.8 oz) M Vag-Spont EPI N WALT        Past Medical History:   Diagnosis Date    Allergic rhinitis     Asthma     Concussion     Conjunctivitis     allergic       Past Surgical History:   Procedure Laterality Date    FOOT SURGERY  2021    FRACTURE SURGERY  21    Fell down stairs and broke my foot       Social History     Tobacco Use   Smoking Status Never   Smokeless Tobacco Never        Social History     Substance and Sexual Activity   Alcohol Use Not Currently               HPI: Patient here today for OB visit.  Patient is doing well and denies needs or concerns at this time.    Yes PT denies fever, chills, nausea and vomiting       Vitals:  Estimated body mass index is 37.34 kg/m² as calculated from the following:    Height as of 24: 1.715 m (5' 7.5\").    Weight as of this encounter: 109.8 kg (242 lb).  BP: 122/78  Weight - Scale: 109.8 kg (242 lb)  Pulse: 91  Patient Position: Sitting  Albumin: Negative  Glucose: Negative  Fundal Height (cm): 35 cm  Fetal HR: 134  Movement: Present           Abdomen: soft    Results reviewed today:    No results found for this visit on 24.        ASSESSMENT & Plan    Diagnosis Orders   1. 33 weeks gestation of pregnancy        2. Encounter for supervision of other normal pregnancy in third trimester                  I am having Anay Mendez

## 2025-01-15 ENCOUNTER — HOSPITAL ENCOUNTER (OUTPATIENT)
Age: 30
Setting detail: SPECIMEN
Discharge: HOME | End: 2025-01-15
Payer: COMMERCIAL

## 2025-01-15 ENCOUNTER — ROUTINE PRENATAL (OUTPATIENT)
Dept: OBGYN | Age: 30
End: 2025-01-15

## 2025-01-15 VITALS
SYSTOLIC BLOOD PRESSURE: 122 MMHG | HEART RATE: 106 BPM | BODY MASS INDEX: 38.42 KG/M2 | DIASTOLIC BLOOD PRESSURE: 78 MMHG | WEIGHT: 249 LBS

## 2025-01-15 DIAGNOSIS — Z3A.36 36 WEEKS GESTATION OF PREGNANCY: Primary | ICD-10-CM

## 2025-01-15 DIAGNOSIS — Z3A.36 36 WEEKS GESTATION OF PREGNANCY: ICD-10-CM

## 2025-01-15 PROCEDURE — 0502F SUBSEQUENT PRENATAL CARE: CPT | Performed by: ADVANCED PRACTICE MIDWIFE

## 2025-01-15 PROCEDURE — 87081 CULTURE SCREEN ONLY: CPT

## 2025-01-15 NOTE — PROGRESS NOTES
Anay Mendez is here at 36w0d for:    Chief Complaint   Patient presents with    Routine Prenatal Visit     GBS, pt states no issues or concerns. Pt does not want to be checked        Estimated Due Date: Estimated Date of Delivery: 25    OB History    Para Term  AB Living   3 2 2 0 0 2   SAB IAB Ectopic Molar Multiple Live Births   0 0 0   0 2      # Outcome Date GA Lbr Aidan/2nd Weight Sex Type Anes PTL Lv   3 Current            2 Term 23 40w2d  3.447 kg (7 lb 9.6 oz) F Vag-Spont EPI N WALT      Complications: Nuchal cord, single gestation   1 Term 21 39w0d 11:52 / 00:24 3.737 kg (8 lb 3.8 oz) M Vag-Spont EPI N WALT        Past Medical History:   Diagnosis Date    Allergic rhinitis     Asthma     Concussion     Conjunctivitis     allergic       Past Surgical History:   Procedure Laterality Date    FOOT SURGERY  2021    FRACTURE SURGERY  21    Fell down stairs and broke my foot       Social History     Tobacco Use   Smoking Status Never   Smokeless Tobacco Never        Social History     Substance and Sexual Activity   Alcohol Use Not Currently               HPI: Patient here today for OB visit.  Patient denies needs or concerns at this time    Yes PT denies fever, chills, nausea and vomiting       Vitals:  Estimated body mass index is 38.42 kg/m² as calculated from the following:    Height as of 24: 1.715 m (5' 7.5\").    Weight as of this encounter: 112.9 kg (249 lb).  BP: 122/78  Weight - Scale: 112.9 kg (249 lb)  Pulse: (!) 106  Patient Position: Sitting  Albumin: 1+  Glucose: Negative  Fundal Height (cm): 37 cm  Fetal HR: 148  Movement: Present  Comments: no sve           Abdomen: soft    Results reviewed today:    No results found for this visit on 01/15/25.        ASSESSMENT & Plan    Diagnosis Orders   1. 36 weeks gestation of pregnancy  Culture, Strep B Screen, Vaginal/Rectal                I am having Anay Mendez maintain her Prenatal Vit-Fe Fumarate-FA

## 2025-01-18 LAB
MICROORGANISM SPEC CULT: NORMAL
SERVICE CMNT-IMP: NORMAL
SPECIMEN DESCRIPTION: NORMAL

## 2025-01-22 ENCOUNTER — ROUTINE PRENATAL (OUTPATIENT)
Dept: OBGYN | Age: 30
End: 2025-01-22

## 2025-01-22 VITALS
WEIGHT: 254 LBS | BODY MASS INDEX: 39.19 KG/M2 | SYSTOLIC BLOOD PRESSURE: 120 MMHG | DIASTOLIC BLOOD PRESSURE: 74 MMHG | HEART RATE: 98 BPM

## 2025-01-22 DIAGNOSIS — Z3A.37 37 WEEKS GESTATION OF PREGNANCY: Primary | ICD-10-CM

## 2025-01-22 DIAGNOSIS — Z34.83 ENCOUNTER FOR SUPERVISION OF OTHER NORMAL PREGNANCY IN THIRD TRIMESTER: ICD-10-CM

## 2025-01-22 PROCEDURE — 0502F SUBSEQUENT PRENATAL CARE: CPT | Performed by: ADVANCED PRACTICE MIDWIFE

## 2025-01-22 NOTE — PROGRESS NOTES
Anay Mendez is here at 37w0d for:    Chief Complaint   Patient presents with    Routine Prenatal Visit     SVE, pt states no issues or concerns        Estimated Due Date: Estimated Date of Delivery: 25    OB History    Para Term  AB Living   3 2 2 0 0 2   SAB IAB Ectopic Molar Multiple Live Births   0 0 0   0 2      # Outcome Date GA Lbr Aidan/2nd Weight Sex Type Anes PTL Lv   3 Current            2 Term 23 40w2d  3.447 kg (7 lb 9.6 oz) F Vag-Spont EPI N WALT      Complications: Nuchal cord, single gestation   1 Term 21 39w0d 11:52 / 00:24 3.737 kg (8 lb 3.8 oz) M Vag-Spont EPI N WALT        Past Medical History:   Diagnosis Date    Allergic rhinitis     Asthma     Concussion     Conjunctivitis     allergic       Past Surgical History:   Procedure Laterality Date    FOOT SURGERY  2021    FRACTURE SURGERY  21    Fell down stairs and broke my foot       Social History     Tobacco Use   Smoking Status Never   Smokeless Tobacco Never        Social History     Substance and Sexual Activity   Alcohol Use Not Currently               HPI: Patient today for OB visit.  Patient denies needs or concerns at this time    Yes PT denies fever, chills, nausea and vomiting       Vitals:  Estimated body mass index is 39.19 kg/m² as calculated from the following:    Height as of 24: 1.715 m (5' 7.5\").    Weight as of this encounter: 115.2 kg (254 lb).  BP: 120/74  Weight - Scale: 115.2 kg (254 lb)  Pulse: 98  Patient Position: Sitting  Albumin: 1+  Glucose: Negative  Fundal Height (cm): 38 cm  Fetal HR: 160  Movement: Present  Comments: fetal movements noted           Abdomen: soft    Results reviewed today:    No results found for this visit on 25.        ASSESSMENT & Plan    Diagnosis Orders   1. 37 weeks gestation of pregnancy        2. Encounter for supervision of other normal pregnancy in third trimester                  I am having Anay Mendez maintain her Prenatal

## 2025-01-28 ENCOUNTER — OFFICE VISIT (OUTPATIENT)
Dept: PRIMARY CARE CLINIC | Age: 30
End: 2025-01-28

## 2025-01-28 VITALS
BODY MASS INDEX: 38.92 KG/M2 | DIASTOLIC BLOOD PRESSURE: 78 MMHG | HEART RATE: 98 BPM | WEIGHT: 252.2 LBS | SYSTOLIC BLOOD PRESSURE: 122 MMHG | OXYGEN SATURATION: 99 %

## 2025-01-28 DIAGNOSIS — S81.812A LACERATION OF LEFT LOWER EXTREMITY, INITIAL ENCOUNTER: ICD-10-CM

## 2025-01-28 DIAGNOSIS — L25.5 RHUS DERMATITIS: ICD-10-CM

## 2025-01-28 DIAGNOSIS — T14.8XXA SPLINTER IN SKIN: Primary | ICD-10-CM

## 2025-01-28 SDOH — ECONOMIC STABILITY: FOOD INSECURITY: WITHIN THE PAST 12 MONTHS, YOU WORRIED THAT YOUR FOOD WOULD RUN OUT BEFORE YOU GOT MONEY TO BUY MORE.: NEVER TRUE

## 2025-01-28 SDOH — ECONOMIC STABILITY: FOOD INSECURITY: WITHIN THE PAST 12 MONTHS, THE FOOD YOU BOUGHT JUST DIDN'T LAST AND YOU DIDN'T HAVE MONEY TO GET MORE.: NEVER TRUE

## 2025-01-28 ASSESSMENT — ENCOUNTER SYMPTOMS
COUGH: 0
SHORTNESS OF BREATH: 0
COLOR CHANGE: 1
WHEEZING: 0
NAUSEA: 0
VOMITING: 0
DIARRHEA: 0

## 2025-01-28 NOTE — PROGRESS NOTES
2025     Anay Mendez (:  1995) is a 29 y.o. female, here for evaluation of the following medical concerns:  Chief Complaint:   Chief Complaint   Patient presents with    Foreign Body     Patient has wood in her ring finger on left hand very long piece. She has tried to get it out herself and she can't.  She also stated when she was loading wood she doesn't know if she has some type of poison ivy. She has a spot on belly and close to her nipple.  Patient is being induced next Wednesday.       Foreign Body  The incident occurred 12 to 24 hours ago. The incident was witnessed. Pertinent negatives include no chest pain, cough, fever, vomiting or wheezing.   Wound Check    Rash  Pertinent negatives include no cough, diarrhea, fatigue, fever, shortness of breath or vomiting.       Prior to Visit Medications    Medication Sig Taking? Authorizing Provider   collagenase (SANTYL) 250 UNIT/GM ointment Apply  a nickel thick application topically to left lower leg wound twice daily. Yes Flaco Matias APRN - CNP   cephALEXin (KEFLEX) 250 MG capsule Take 1 capsule by mouth 4 times daily for 10 days Yes Flaco Matias APRN - CNP   ondansetron (ZOFRAN) 4 MG tablet Take 1 tablet by mouth 3 times daily as needed for Nausea or Vomiting Yes Yadira Traore APRN - CNM   omeprazole (PRILOSEC) 20 MG delayed release capsule Take 1 capsule by mouth every morning (before breakfast) Yes Yadira Traore APRN - CNM   Prenatal Vit-Fe Fumarate-FA (PRENATAL 19 PO) Take by mouth Yes Provider, MD Merari   docusate sodium (COLACE) 100 MG capsule Take 1 capsule by mouth 2 times daily  Patient not taking: Reported on 2024  Yadira Traore APRN - CNM        Social History     Tobacco Use    Smoking status: Never    Smokeless tobacco: Never   Substance Use Topics    Alcohol use: Not Currently        Vitals:    25 1123   BP: 122/78   Pulse: 98   SpO2: 99%   Weight: 114.4 kg (252 lb 3.2 oz)     Estimated

## 2025-01-29 ENCOUNTER — HOSPITAL ENCOUNTER (OUTPATIENT)
Age: 30
Setting detail: SPECIMEN
Discharge: HOME | End: 2025-01-29
Payer: COMMERCIAL

## 2025-01-29 ENCOUNTER — ROUTINE PRENATAL (OUTPATIENT)
Dept: OBGYN | Age: 30
End: 2025-01-29

## 2025-01-29 VITALS
DIASTOLIC BLOOD PRESSURE: 78 MMHG | SYSTOLIC BLOOD PRESSURE: 120 MMHG | HEART RATE: 97 BPM | BODY MASS INDEX: 38.58 KG/M2 | WEIGHT: 250 LBS

## 2025-01-29 DIAGNOSIS — R82.90 ABNORMAL URINALYSIS: ICD-10-CM

## 2025-01-29 DIAGNOSIS — Z34.83 ENCOUNTER FOR SUPERVISION OF OTHER NORMAL PREGNANCY IN THIRD TRIMESTER: ICD-10-CM

## 2025-01-29 DIAGNOSIS — Z3A.38 38 WEEKS GESTATION OF PREGNANCY: Primary | ICD-10-CM

## 2025-01-29 DIAGNOSIS — Z3A.38 38 WEEKS GESTATION OF PREGNANCY: ICD-10-CM

## 2025-01-29 LAB
CREAT UR-MCNC: 392 MG/DL (ref 28–217)
TOTAL PROTEIN, URINE: 82 MG/DL
URINE TOTAL PROTEIN CREATININE RATIO: 0.21 (ref 0–0.2)

## 2025-01-29 PROCEDURE — 82570 ASSAY OF URINE CREATININE: CPT

## 2025-01-29 PROCEDURE — 84156 ASSAY OF PROTEIN URINE: CPT

## 2025-01-29 PROCEDURE — 0502F SUBSEQUENT PRENATAL CARE: CPT | Performed by: ADVANCED PRACTICE MIDWIFE

## 2025-01-29 NOTE — PROGRESS NOTES
Anay Mendez is here at 38w0d for:    Chief Complaint   Patient presents with    Routine Prenatal Visit     Pt presents today for routine ob care. Pt desires SVE. No questions or concerns at this time.        Estimated Due Date: Estimated Date of Delivery: 25    OB History    Para Term  AB Living   3 2 2 0 0 2   SAB IAB Ectopic Molar Multiple Live Births   0 0 0   0 2      # Outcome Date GA Lbr Aidan/2nd Weight Sex Type Anes PTL Lv   3 Current            2 Term 23 40w2d  3.447 kg (7 lb 9.6 oz) F Vag-Spont EPI N WALT      Complications: Nuchal cord, single gestation   1 Term 21 39w0d 11:52 / 00:24 3.737 kg (8 lb 3.8 oz) M Vag-Spont EPI N WALT        Past Medical History:   Diagnosis Date    Allergic rhinitis     Asthma     Concussion     Conjunctivitis     allergic       Past Surgical History:   Procedure Laterality Date    FOOT SURGERY  2021    FRACTURE SURGERY  21    Fell down stairs and broke my foot       Social History     Tobacco Use   Smoking Status Never   Smokeless Tobacco Never        Social History     Substance and Sexual Activity   Alcohol Use Not Currently               HPI: Patient here today for OB visit.  Patient denies needs or concerns at this    Yes PT denies fever, chills, nausea and vomiting       Vitals:  Estimated body mass index is 38.58 kg/m² as calculated from the following:    Height as of 24: 1.715 m (5' 7.5\").    Weight as of this encounter: 113.4 kg (250 lb).  BP: 120/78  Weight - Scale: 113.4 kg (250 lb)  Pulse: 97  Patient Position: Sitting  Albumin: 1+  Glucose: Negative  Fundal Height (cm): 38 cm  Fetal HR: 144  Movement: Present  Dilation (cm): 1  Effacement: 60           Abdomen: soft    Results reviewed today:    No results found for this visit on 25.        ASSESSMENT & Plan    Diagnosis Orders   1. 38 weeks gestation of pregnancy  Protein / creatinine ratio, urine      2. Encounter for supervision of other normal pregnancy

## 2025-01-30 DIAGNOSIS — O21.9 NAUSEA AND VOMITING DURING PREGNANCY: ICD-10-CM

## 2025-01-30 RX ORDER — ONDANSETRON 4 MG/1
4 TABLET, FILM COATED ORAL 3 TIMES DAILY PRN
Qty: 30 TABLET | Refills: 0 | Status: SHIPPED | OUTPATIENT
Start: 2025-01-30

## 2025-02-02 ENCOUNTER — HOSPITAL ENCOUNTER (OUTPATIENT)
Age: 30
Discharge: HOME | End: 2025-02-02
Attending: OBSTETRICS & GYNECOLOGY | Admitting: OBSTETRICS & GYNECOLOGY
Payer: COMMERCIAL

## 2025-02-02 VITALS
HEART RATE: 85 BPM | SYSTOLIC BLOOD PRESSURE: 137 MMHG | TEMPERATURE: 97.8 F | BODY MASS INDEX: 39.24 KG/M2 | OXYGEN SATURATION: 99 % | RESPIRATION RATE: 16 BRPM | HEIGHT: 67 IN | WEIGHT: 250 LBS | DIASTOLIC BLOOD PRESSURE: 80 MMHG

## 2025-02-02 PROBLEM — O36.8190 DECREASED FETAL MOVEMENT AFFECTING MANAGEMENT OF MOTHER, ANTEPARTUM: Status: ACTIVE | Noted: 2025-02-02

## 2025-02-02 LAB
BACTERIA URNS QL MICRO: ABNORMAL
BILIRUB UR QL STRIP: NEGATIVE
CLARITY UR: CLEAR
COLOR UR: YELLOW
EPI CELLS #/AREA URNS HPF: ABNORMAL /HPF (ref 0–25)
GLUCOSE UR STRIP-MCNC: NEGATIVE MG/DL
HGB UR QL STRIP.AUTO: NEGATIVE
KETONES UR STRIP-MCNC: ABNORMAL MG/DL
LEUKOCYTE ESTERASE UR QL STRIP: ABNORMAL
MUCOUS THREADS URNS QL MICRO: ABNORMAL
NITRITE UR QL STRIP: NEGATIVE
PH UR STRIP: 6.5 [PH] (ref 5–9)
PROT UR STRIP-MCNC: NEGATIVE MG/DL
RBC #/AREA URNS HPF: ABNORMAL /HPF (ref 0–2)
SP GR UR STRIP: 1.02 (ref 1.01–1.02)
UROBILINOGEN UR STRIP-ACNC: NORMAL EU/DL (ref 0–1)
WBC #/AREA URNS HPF: ABNORMAL /HPF (ref 0–5)

## 2025-02-02 PROCEDURE — 81001 URINALYSIS AUTO W/SCOPE: CPT

## 2025-02-02 PROCEDURE — 59025 FETAL NON-STRESS TEST: CPT

## 2025-02-02 PROCEDURE — 99213 OFFICE O/P EST LOW 20 MIN: CPT

## 2025-02-02 NOTE — PROGRESS NOTES
Patient arrives to unit via wheelchair with c/o decreased FM and possible leaking fluid. Pt states she has felt fetal movement, but its been less than fetus's normal and not as strong. Pt escorted to room 207 and instructed to change into a gown and provide urine sample.

## 2025-02-02 NOTE — DISCHARGE INSTRUCTIONS
OUTPATIENT DISCHARGE  Dr. Roro Traore [facility]  Dr. Radha Moe [facility]  [address]  [address] [phone]   or [place] [phone]       ACTIVITY LIMITATIONS:  (  X)Up and about as desired and tolerated  (  )Up to bathroom only  (  )Lay on either side  (  )Avoid heavy lifting or exercise  (  )No sex  (  )No nipple stimulation  (  )Complet bedrest  (  )Avoid using stairs  (  X)Increase fluids  **DRINK AT LEAST eight-8oz. Glasses of water daily.**    Call your Doctor if:  ( X )Contractions are every 5 minutes apart (from start of one to the start of the next contraction) lasting 60 seconds for at least 1 hour, strong enough you can not walk or talk through the contraction and regular.  ( X)Bag of water breaks  (X)Vaginal bleeding  (  X)Unusual pain occurs  (  x)Decreased fetal movement  (  ) labor:  If you have 4 contractions in an hour    Keep your scheduled follow up appointment.      IN CASE OF EMERGENCY CONTACT LABOR AND DELIVERY [phone].

## 2025-02-02 NOTE — PROGRESS NOTES
Amnio swab performed at this time and \PH of 5 noted, negative reading. Pt's perineum dry and no fluid noted on perineum and no fluid noted from vaginal opening.

## 2025-02-02 NOTE — PROGRESS NOTES
Anay Mendez is here in L&D at 38w4d for:decreased fetal movement      Maternal Vitals  Temp: 97.8 °F (36.6 °C)  Temp Source: Oral  BP: 137/80  Pulse: 85  Respirations: 16         Findings  Baseline: 140 BPM  Baseline Classification: Normal  Variability: Moderate  Decelerations: None  Accelerations: Yes  Acoustic Stimulator: No  Fetal Movement: Yes  Multiple Gestation?: No  Uterine contractions/10 min.: 1  Interpretation: Reactive  Interpreted by: TAVON Garza RN, JORGE Horn RN             NST Time start:  1140  NST Time stop:   1200        NST is reactive. Quality of tracing is satisfactory.

## 2025-02-02 NOTE — PROGRESS NOTES
Writer called Dr. Reina at this time and updated patient arrival, c/o  fetal movement and leaking fluid. EFM, VS, UA results and amnio swab being negative reviewed with DO. Orders to discharge patient home received.

## 2025-02-02 NOTE — PROGRESS NOTES
Patient ambulatory off unit at this time for discharge home.    Writer spoke with the patient's mother and notified her of the x-ray results. Mom is in agreement with this plan and will update us if needed. There are no further questions or concerns at this time.

## 2025-02-03 ENCOUNTER — TELEPHONE (OUTPATIENT)
Dept: PRIMARY CARE CLINIC | Age: 30
End: 2025-02-03

## 2025-02-03 NOTE — TELEPHONE ENCOUNTER
Called pt. To let her know the medication(cream) was denied. Spoke with Flaco Cuadra CNP. He stated since medication was denied then we will follow up and check on wound. And maybe debride it.

## 2025-02-03 NOTE — TELEPHONE ENCOUNTER
Called pt.and updated about cream that was prescribed at last visit. This writer stated that the PA was started and will update pt. Pt. Understood.

## 2025-02-05 ENCOUNTER — ANESTHESIA EVENT (OUTPATIENT)
Dept: LABOR AND DELIVERY | Age: 30
End: 2025-02-05
Payer: COMMERCIAL

## 2025-02-05 ENCOUNTER — ANESTHESIA (OUTPATIENT)
Dept: LABOR AND DELIVERY | Age: 30
End: 2025-02-05
Payer: COMMERCIAL

## 2025-02-05 ENCOUNTER — HOSPITAL ENCOUNTER (INPATIENT)
Age: 30
LOS: 1 days | Discharge: HOME | End: 2025-02-06
Attending: ADVANCED PRACTICE MIDWIFE | Admitting: ADVANCED PRACTICE MIDWIFE
Payer: COMMERCIAL

## 2025-02-05 PROBLEM — Z3A.39 39 WEEKS GESTATION OF PREGNANCY: Status: ACTIVE | Noted: 2025-02-05

## 2025-02-05 PROBLEM — Z34.90 TERM PREGNANCY: Status: ACTIVE | Noted: 2025-02-05

## 2025-02-05 LAB
ABO + RH BLD: NORMAL
ARM BAND NUMBER: NORMAL
BASOPHILS # BLD: 0.05 K/UL (ref 0–0.2)
BASOPHILS NFR BLD: 1 % (ref 0–2)
BLOOD BANK SAMPLE EXPIRATION: NORMAL
BLOOD GROUP ANTIBODIES SERPL: NEGATIVE
EOSINOPHIL # BLD: 0.1 K/UL (ref 0–0.44)
EOSINOPHILS RELATIVE PERCENT: 1 % (ref 1–4)
ERYTHROCYTE [DISTWIDTH] IN BLOOD BY AUTOMATED COUNT: 13 % (ref 11.8–14.4)
HCT VFR BLD AUTO: 34.9 % (ref 36.3–47.1)
HGB BLD-MCNC: 11.4 G/DL (ref 11.9–15.1)
IMM GRANULOCYTES # BLD AUTO: 0.06 K/UL (ref 0–0.3)
IMM GRANULOCYTES NFR BLD: 1 %
LYMPHOCYTES NFR BLD: 1.79 K/UL (ref 1.1–3.7)
LYMPHOCYTES RELATIVE PERCENT: 20 % (ref 24–43)
MCH RBC QN AUTO: 27.5 PG (ref 25.2–33.5)
MCHC RBC AUTO-ENTMCNC: 32.7 G/DL (ref 28.4–34.8)
MCV RBC AUTO: 84.1 FL (ref 82.6–102.9)
MONOCYTES NFR BLD: 0.56 K/UL (ref 0.1–1.2)
MONOCYTES NFR BLD: 6 % (ref 3–12)
NEUTROPHILS NFR BLD: 71 % (ref 36–65)
NEUTS SEG NFR BLD: 6.61 K/UL (ref 1.5–8.1)
NRBC BLD-RTO: 0 PER 100 WBC
PLATELET # BLD AUTO: 223 K/UL (ref 138–453)
PMV BLD AUTO: 12.2 FL (ref 8.1–13.5)
RBC # BLD AUTO: 4.15 M/UL (ref 3.95–5.11)
WBC OTHER # BLD: 9.2 K/UL (ref 3.5–11.3)

## 2025-02-05 PROCEDURE — 85025 COMPLETE CBC W/AUTO DIFF WBC: CPT

## 2025-02-05 PROCEDURE — 6360000002 HC RX W HCPCS: Performed by: NURSE ANESTHETIST, CERTIFIED REGISTERED

## 2025-02-05 PROCEDURE — 10907ZC DRAINAGE OF AMNIOTIC FLUID, THERAPEUTIC FROM PRODUCTS OF CONCEPTION, VIA NATURAL OR ARTIFICIAL OPENING: ICD-10-PCS | Performed by: ADVANCED PRACTICE MIDWIFE

## 2025-02-05 PROCEDURE — 6370000000 HC RX 637 (ALT 250 FOR IP): Performed by: ADVANCED PRACTICE MIDWIFE

## 2025-02-05 PROCEDURE — 86850 RBC ANTIBODY SCREEN: CPT

## 2025-02-05 PROCEDURE — 59400 OBSTETRICAL CARE: CPT | Performed by: ADVANCED PRACTICE MIDWIFE

## 2025-02-05 PROCEDURE — 7200000001 HC VAGINAL DELIVERY

## 2025-02-05 PROCEDURE — 2580000003 HC RX 258: Performed by: ADVANCED PRACTICE MIDWIFE

## 2025-02-05 PROCEDURE — 86901 BLOOD TYPING SEROLOGIC RH(D): CPT

## 2025-02-05 PROCEDURE — 1220000000 HC SEMI PRIVATE OB R&B

## 2025-02-05 PROCEDURE — 86900 BLOOD TYPING SEROLOGIC ABO: CPT

## 2025-02-05 PROCEDURE — 3E033VJ INTRODUCTION OF OTHER HORMONE INTO PERIPHERAL VEIN, PERCUTANEOUS APPROACH: ICD-10-PCS | Performed by: ADVANCED PRACTICE MIDWIFE

## 2025-02-05 PROCEDURE — 36415 COLL VENOUS BLD VENIPUNCTURE: CPT

## 2025-02-05 PROCEDURE — 6360000002 HC RX W HCPCS: Performed by: ADVANCED PRACTICE MIDWIFE

## 2025-02-05 PROCEDURE — 51702 INSERT TEMP BLADDER CATH: CPT

## 2025-02-05 PROCEDURE — 62325 NJX INTERLAMINAR CRV/THRC: CPT | Performed by: NURSE ANESTHETIST, CERTIFIED REGISTERED

## 2025-02-05 RX ORDER — ONDANSETRON 2 MG/ML
4 INJECTION INTRAMUSCULAR; INTRAVENOUS EVERY 6 HOURS PRN
Status: DISCONTINUED | OUTPATIENT
Start: 2025-02-05 | End: 2025-02-06 | Stop reason: HOSPADM

## 2025-02-05 RX ORDER — NALBUPHINE HYDROCHLORIDE 10 MG/ML
10 INJECTION INTRAMUSCULAR; INTRAVENOUS; SUBCUTANEOUS
Status: DISCONTINUED | OUTPATIENT
Start: 2025-02-05 | End: 2025-02-05

## 2025-02-05 RX ORDER — EPHEDRINE SULFATE/0.9% NACL/PF 25 MG/5 ML
10 SYRINGE (ML) INTRAVENOUS
Status: DISCONTINUED | OUTPATIENT
Start: 2025-02-05 | End: 2025-02-05

## 2025-02-05 RX ORDER — BUTORPHANOL TARTRATE 2 MG/ML
1 INJECTION, SOLUTION INTRAMUSCULAR; INTRAVENOUS
Status: DISCONTINUED | OUTPATIENT
Start: 2025-02-05 | End: 2025-02-05

## 2025-02-05 RX ORDER — SODIUM CHLORIDE, SODIUM LACTATE, POTASSIUM CHLORIDE, CALCIUM CHLORIDE 600; 310; 30; 20 MG/100ML; MG/100ML; MG/100ML; MG/100ML
INJECTION, SOLUTION INTRAVENOUS CONTINUOUS
Status: DISCONTINUED | OUTPATIENT
Start: 2025-02-05 | End: 2025-02-05

## 2025-02-05 RX ORDER — ACETAMINOPHEN 500 MG
1000 TABLET ORAL EVERY 8 HOURS PRN
Status: DISCONTINUED | OUTPATIENT
Start: 2025-02-05 | End: 2025-02-06 | Stop reason: HOSPADM

## 2025-02-05 RX ORDER — CARBOPROST TROMETHAMINE 250 UG/ML
250 INJECTION, SOLUTION INTRAMUSCULAR PRN
Status: DISCONTINUED | OUTPATIENT
Start: 2025-02-05 | End: 2025-02-05

## 2025-02-05 RX ORDER — NALOXONE HYDROCHLORIDE 0.4 MG/ML
INJECTION, SOLUTION INTRAMUSCULAR; INTRAVENOUS; SUBCUTANEOUS PRN
Status: DISCONTINUED | OUTPATIENT
Start: 2025-02-05 | End: 2025-02-05

## 2025-02-05 RX ORDER — SODIUM CHLORIDE, SODIUM LACTATE, POTASSIUM CHLORIDE, AND CALCIUM CHLORIDE .6; .31; .03; .02 G/100ML; G/100ML; G/100ML; G/100ML
1000 INJECTION, SOLUTION INTRAVENOUS PRN
Status: DISCONTINUED | OUTPATIENT
Start: 2025-02-05 | End: 2025-02-05

## 2025-02-05 RX ORDER — LIDOCAINE HYDROCHLORIDE 10 MG/ML
30 INJECTION, SOLUTION EPIDURAL; INFILTRATION; INTRACAUDAL; PERINEURAL PRN
Status: DISCONTINUED | OUTPATIENT
Start: 2025-02-05 | End: 2025-02-05

## 2025-02-05 RX ORDER — SODIUM CHLORIDE, SODIUM LACTATE, POTASSIUM CHLORIDE, AND CALCIUM CHLORIDE .6; .31; .03; .02 G/100ML; G/100ML; G/100ML; G/100ML
500 INJECTION, SOLUTION INTRAVENOUS PRN
Status: DISCONTINUED | OUTPATIENT
Start: 2025-02-05 | End: 2025-02-05

## 2025-02-05 RX ORDER — ACETAMINOPHEN 325 MG/1
650 TABLET ORAL EVERY 4 HOURS PRN
Status: DISCONTINUED | OUTPATIENT
Start: 2025-02-05 | End: 2025-02-05

## 2025-02-05 RX ORDER — METHYLERGONOVINE MALEATE 0.2 MG/ML
200 INJECTION INTRAVENOUS PRN
Status: DISCONTINUED | OUTPATIENT
Start: 2025-02-05 | End: 2025-02-06 | Stop reason: HOSPADM

## 2025-02-05 RX ORDER — ONDANSETRON 2 MG/ML
4 INJECTION INTRAMUSCULAR; INTRAVENOUS EVERY 6 HOURS PRN
Status: DISCONTINUED | OUTPATIENT
Start: 2025-02-05 | End: 2025-02-05

## 2025-02-05 RX ORDER — MISOPROSTOL 100 UG/1
800 TABLET ORAL PRN
Status: DISCONTINUED | OUTPATIENT
Start: 2025-02-05 | End: 2025-02-06 | Stop reason: HOSPADM

## 2025-02-05 RX ORDER — IBUPROFEN 800 MG/1
800 TABLET, FILM COATED ORAL EVERY 8 HOURS SCHEDULED
Status: DISCONTINUED | OUTPATIENT
Start: 2025-02-05 | End: 2025-02-06 | Stop reason: HOSPADM

## 2025-02-05 RX ORDER — SEVOFLURANE 250 ML/250ML
1 LIQUID RESPIRATORY (INHALATION) CONTINUOUS PRN
Status: DISCONTINUED | OUTPATIENT
Start: 2025-02-05 | End: 2025-02-05

## 2025-02-05 RX ORDER — SODIUM CHLORIDE 0.9 % (FLUSH) 0.9 %
5-40 SYRINGE (ML) INJECTION EVERY 12 HOURS SCHEDULED
Status: DISCONTINUED | OUTPATIENT
Start: 2025-02-05 | End: 2025-02-06 | Stop reason: HOSPADM

## 2025-02-05 RX ORDER — EPHEDRINE SULFATE/0.9% NACL/PF 25 MG/5 ML
5 SYRINGE (ML) INTRAVENOUS PRN
Status: DISCONTINUED | OUTPATIENT
Start: 2025-02-06 | End: 2025-02-05

## 2025-02-05 RX ORDER — ONDANSETRON 4 MG/1
4 TABLET, ORALLY DISINTEGRATING ORAL EVERY 6 HOURS PRN
Status: DISCONTINUED | OUTPATIENT
Start: 2025-02-05 | End: 2025-02-05

## 2025-02-05 RX ORDER — ROPIVACAINE HYDROCHLORIDE 2 MG/ML
10 INJECTION, SOLUTION EPIDURAL; INFILTRATION; PERINEURAL CONTINUOUS
Status: DISCONTINUED | OUTPATIENT
Start: 2025-02-05 | End: 2025-02-05

## 2025-02-05 RX ORDER — MODIFIED LANOLIN
OINTMENT (GRAM) TOPICAL PRN
Status: DISCONTINUED | OUTPATIENT
Start: 2025-02-05 | End: 2025-02-06 | Stop reason: HOSPADM

## 2025-02-05 RX ORDER — SODIUM CHLORIDE 0.9 % (FLUSH) 0.9 %
5-40 SYRINGE (ML) INJECTION EVERY 12 HOURS SCHEDULED
Status: DISCONTINUED | OUTPATIENT
Start: 2025-02-05 | End: 2025-02-05

## 2025-02-05 RX ORDER — MISOPROSTOL 100 UG/1
200 TABLET ORAL PRN
Status: DISCONTINUED | OUTPATIENT
Start: 2025-02-05 | End: 2025-02-06 | Stop reason: HOSPADM

## 2025-02-05 RX ORDER — ROPIVACAINE HYDROCHLORIDE 2 MG/ML
INJECTION, SOLUTION EPIDURAL; INFILTRATION; PERINEURAL
Status: DISCONTINUED | OUTPATIENT
Start: 2025-02-05 | End: 2025-02-05 | Stop reason: SDUPTHER

## 2025-02-05 RX ORDER — ONDANSETRON 4 MG/1
4 TABLET, ORALLY DISINTEGRATING ORAL EVERY 6 HOURS PRN
Status: DISCONTINUED | OUTPATIENT
Start: 2025-02-05 | End: 2025-02-06 | Stop reason: HOSPADM

## 2025-02-05 RX ORDER — DOCUSATE SODIUM 100 MG/1
100 CAPSULE, LIQUID FILLED ORAL 2 TIMES DAILY PRN
Status: DISCONTINUED | OUTPATIENT
Start: 2025-02-05 | End: 2025-02-06 | Stop reason: HOSPADM

## 2025-02-05 RX ORDER — CARBOPROST TROMETHAMINE 250 UG/ML
250 INJECTION, SOLUTION INTRAMUSCULAR PRN
Status: DISCONTINUED | OUTPATIENT
Start: 2025-02-05 | End: 2025-02-06 | Stop reason: HOSPADM

## 2025-02-05 RX ORDER — METHYLERGONOVINE MALEATE 0.2 MG/ML
200 INJECTION INTRAVENOUS PRN
Status: DISCONTINUED | OUTPATIENT
Start: 2025-02-05 | End: 2025-02-05

## 2025-02-05 RX ADMIN — IBUPROFEN 800 MG: 800 TABLET, FILM COATED ORAL at 15:56

## 2025-02-05 RX ADMIN — ROPIVACAINE HYDROCHLORIDE 10 ML/HR: 2 INJECTION, SOLUTION EPIDURAL; INFILTRATION at 11:19

## 2025-02-05 RX ADMIN — SODIUM CHLORIDE, POTASSIUM CHLORIDE, SODIUM LACTATE AND CALCIUM CHLORIDE 1000 ML: 600; 310; 30; 20 INJECTION, SOLUTION INTRAVENOUS at 11:10

## 2025-02-05 RX ADMIN — ROPIVACAINE HYDROCHLORIDE 10 ML: 2 INJECTION, SOLUTION EPIDURAL; INFILTRATION at 11:18

## 2025-02-05 RX ADMIN — Medication 999 ML/HR: at 13:47

## 2025-02-05 RX ADMIN — ACETAMINOPHEN 1000 MG: 500 TABLET ORAL at 21:34

## 2025-02-05 RX ADMIN — BENZOCAINE AND LEVOMENTHOL: 200; 5 SPRAY TOPICAL at 17:04

## 2025-02-05 RX ADMIN — Medication 1 MILLI-UNITS/MIN: at 05:55

## 2025-02-05 RX ADMIN — ONDANSETRON 4 MG: 2 INJECTION, SOLUTION INTRAMUSCULAR; INTRAVENOUS at 12:46

## 2025-02-05 RX ADMIN — SODIUM CHLORIDE, POTASSIUM CHLORIDE, SODIUM LACTATE AND CALCIUM CHLORIDE: 600; 310; 30; 20 INJECTION, SOLUTION INTRAVENOUS at 05:54

## 2025-02-05 RX ADMIN — WITCH HAZEL: 500 SOLUTION RECTAL; TOPICAL at 17:03

## 2025-02-05 ASSESSMENT — PAIN SCALES - GENERAL
PAINLEVEL_OUTOF10: 3
PAINLEVEL_OUTOF10: 3

## 2025-02-05 ASSESSMENT — PAIN DESCRIPTION - LOCATION
LOCATION: ABDOMEN
LOCATION: HEAD

## 2025-02-05 ASSESSMENT — PAIN DESCRIPTION - DESCRIPTORS: DESCRIPTORS: CRAMPING

## 2025-02-05 ASSESSMENT — PAIN DESCRIPTION - ORIENTATION
ORIENTATION: LOWER
ORIENTATION: MID;LOWER

## 2025-02-05 ASSESSMENT — PAIN - FUNCTIONAL ASSESSMENT: PAIN_FUNCTIONAL_ASSESSMENT: ACTIVITIES ARE NOT PREVENTED

## 2025-02-05 NOTE — PLAN OF CARE
Problem: Vaginal Birth or  Section  Goal: Fetal and maternal status remain reassuring during the birth process  Description:  Birth OB-Pregnancy care plan goal which identifies if the fetal and maternal status remain reassuring during the birth process  2025 1410 by Deja Garza RN  Outcome: Completed  2025 0757 by Deja Garza, RN  Outcome: Progressing

## 2025-02-05 NOTE — L&D DELIVERY NOTE
Samy Mendez [[id number]]      Labor Events     Labor: No   Steroids: None  Cervical Ripening Date/Time:      Antibiotics Received during Labor: Yes  Rupture Identifier: Sac 1  Rupture Date/Time:  25 07:03:00   Rupture Type: AROM  Fluid Color: Clear  Fluid Odor: None  Fluid Volume: Scant  Induction: AROM, Oxytocin  Augmentation: None  Labor Complications: None       Anesthesia    Method: Epidural       Labor Event Times      Labor onset date/time:        Dilation complete date/time:  25 13:25:00 EST     Start pushing date/time:  2025 13:34:00   Decision date/time (emergent ):            Labor Length    2nd stage: 0h 13m  3rd stage: 0h 08m       Delivery Details      Delivery Date: 25 Delivery Time: 13:38:00   Delivery Type: Vaginal, Spontaneous               Presentation    Presentation: Vertex  Position: Middle  _: Occiput  _: Anterior       Shoulder Dystocia    Shoulder Dystocia Present?: No       Assisted Delivery Details    Forceps Attempted?: No  Vacuum Extractor Attempted?: No                           Cord    Vessels: 3 Vessels  Complications: Nuchal Loose  Delayed Cord Clamping?: Yes  Cord Clamped Date/Time: 2025 13:45:00  Cord Blood Disposition: Lab  Gases Sent?: No   Cord Comments:  PROCEDURAL - OBSTETRIC - CORD OBSERVATION   cord segment obtained and sent to lab             Placenta    Date/Time: 2025 13:46:00  Removal: Spontaneous  Appearance: Intact  Disposition: Discarded       Lacerations    Episiotomy: None  Perineal Lacerations: None  Other Lacerations: no non-perineal laceration       Vaginal Counts    Initial Count Personnel: GARRICK MARTINEZ LPN  Initial Count Verified By: TAVON SARAH RN  Intial Sponge Count: Correct  Intial Instruments Count: Correct   Final Sponges Count: Correct  Final Instruments Count: Correct   Final Count Personnel: CANDIDO SULLIVAN RN  Final Count Verified By: JORGE BARBOSA CNM  Accurate Final Count?: Yes       Blood Loss  Mother:

## 2025-02-05 NOTE — ANESTHESIA PROCEDURE NOTES
Epidural Block    Patient location during procedure: patient floor  Start time: 2/5/2025 11:09 AM  End time: 2/5/2025 11:19 AM  Reason for block: labor epidural and at surgeon's request  Staffing  Resident/CRNA: Charlie Ibarra APRN - CRNA  Performed by: Charlie Ibarra APRN - CRNA  Authorized by: Charlie Ibarra APRN - CRNA    Epidural  Patient position: sitting  Prep: ChloraPrep  Patient monitoring: cardiac monitor, continuous pulse ox and frequent blood pressure checks  Approach: midline  Location: L2-3  Injection technique: LYNETTE air  Provider prep: mask and sterile gloves  Needle  Needle type: Tuohy   Needle gauge: 17 G  Needle length: 3.5 in  Needle insertion depth: 7.5 cm  Catheter type: side hole  Catheter size: 19 G  Catheter at skin depth: 13 cm  Test dose: negativeCatheter Secured: tegaderm and tape  Assessment  Sensory level: T10  Hemodynamics: stable  Attempts: 1  Outcomes: uncomplicated and patient tolerated procedure well  Preanesthetic Checklist  Completed: patient identified, IV checked, site marked, risks and benefits discussed, surgical/procedural consents, equipment checked, pre-op evaluation, timeout performed, anesthesia consent given, oxygen available and monitors applied/VS acknowledged

## 2025-02-05 NOTE — H&P
Department of Obstetrics and Gynecology   Obstetrics History and Physical  H&P Admission Inpatient  Note        CHIEF COMPLAINT:    Chief Complaint   Patient presents with    Scheduled Induction        HISTORY OF PRESENT ILLNESS:      The patient is a 29 y.o. female at 39w0d.  OB History          3    Para   2    Term   2       0    AB   0    Living   2         SAB   0    IAB   0    Ectopic   0    Molar        Multiple   0    Live Births   2            Patient presents with a chief complaint as above and is being admitted for induction    Estimated Due Date: Estimated Date of Delivery: 25    PRENATAL CARE:    Complicated by: none    PAST OB HISTORY:  OB History          3    Para   2    Term   2       0    AB   0    Living   2         SAB   0    IAB   0    Ectopic   0    Molar        Multiple   0    Live Births   2                Past Medical History:        Diagnosis Date    Allergic rhinitis     Asthma     Concussion     Conjunctivitis     allergic     Past Surgical History:        Procedure Laterality Date    FOOT SURGERY  2021    FRACTURE SURGERY  21    Fell down stairs and broke my foot     Allergies:  Patient has no known allergies.    Social History:    Social History     Socioeconomic History    Marital status:      Spouse name: Not on file    Number of children: Not on file    Years of education: Not on file    Highest education level: Not on file   Occupational History    Not on file   Tobacco Use    Smoking status: Never    Smokeless tobacco: Never   Vaping Use    Vaping status: Never Used   Substance and Sexual Activity    Alcohol use: Not Currently    Drug use: No    Sexual activity: Yes     Partners: Male   Other Topics Concern    Not on file   Social History Narrative    Not on file     Social Determinants of Health     Financial Resource Strain: Low Risk  (2024)    Overall Financial Resource Strain (CARDIA)     Difficulty of Paying Living

## 2025-02-05 NOTE — ANESTHESIA PRE PROCEDURE
Department of Anesthesiology  Preprocedure Note       Name:  Anay Mendez   Age:  29 y.o.  :  1995                                          MRN:  [id number]         Date:  2025      Surgeon: * No surgeons listed *    Procedure: * No procedures listed *    Medications prior to admission:   Prior to Admission medications    Medication Sig Start Date End Date Taking? Authorizing Provider   ondansetron (ZOFRAN) 4 MG tablet Take 1 tablet by mouth 3 times daily as needed for Nausea or Vomiting 25   Yadira Traore APRN - CNM   collagenase (SANTYL) 250 UNIT/GM ointment Apply  a nickel thick application topically to left lower leg wound twice daily.  Patient not taking: Reported on 2025   Flaco Matias APRN - CNP   cephALEXin (KEFLEX) 250 MG capsule Take 1 capsule by mouth 4 times daily for 10 days 25  Flaco Matias APRN - CNP   omeprazole (PRILOSEC) 20 MG delayed release capsule Take 1 capsule by mouth every morning (before breakfast) 24   Yadira Traore APRN - CNM   docusate sodium (COLACE) 100 MG capsule Take 1 capsule by mouth 2 times daily  Patient not taking: Reported on 2024  Yadira Traore APRN - CNM   Prenatal Vit-Fe Fumarate-FA (PRENATAL 19 PO) Take by mouth    Provider, MD Merari       Current medications:    Current Facility-Administered Medications   Medication Dose Route Frequency Provider Last Rate Last Admin    oxytocin (PITOCIN) 30 units in 500 mL infusion  1-24 michael-units/min IntraVENous Continuous Yadira Traore APRN - CNM 17 mL/hr at 25 1030 17 michael-units/min at 25 1030    lactated ringers infusion   IntraVENous Continuous Yadira Traore APRN - CNM 75 mL/hr at 25 0554 New Bag at 25 0554    lactated ringers bolus 500 mL  500 mL IntraVENous PRN Yadira Traore APRN - CNM        Or    lactated ringers bolus 1,000 mL  1,000 mL IntraVENous PRN Yadira Traore APRN - CNM 1,935.5 mL/hr at

## 2025-02-05 NOTE — PLAN OF CARE
Problem: Safety - Adult  Goal: Free from fall injury  Outcome: Progressing     Problem: Vaginal Birth or  Section  Goal: Fetal and maternal status remain reassuring during the birth process  Description:  Birth OB-Pregnancy care plan goal which identifies if the fetal and maternal status remain reassuring during the birth process  Outcome: Progressing     Problem: Postpartum  Goal: Experiences normal postpartum course  Description:  Postpartum OB-Pregnancy care plan goal which identifies if the mother is experiencing a normal postpartum course  Outcome: Progressing  Goal: Appropriate maternal -  bonding  Description:  Postpartum OB-Pregnancy care plan goal which identifies if the mother and  are bonding appropriately  Outcome: Progressing  Goal: Establishment of infant feeding pattern  Description:  Postpartum OB-Pregnancy care plan goal which identifies if the mother is establishing a feeding pattern with their   Outcome: Progressing  Goal: Incisions, wounds, or drain sites healing without S/S of infection  Outcome: Progressing     Problem: Pain  Goal: Verbalizes/displays adequate comfort level or baseline comfort level  Outcome: Progressing     Problem: Infection - Adult  Goal: Absence of infection at discharge  Outcome: Progressing  Goal: Absence of infection during hospitalization  Outcome: Progressing  Goal: Absence of fever/infection during anticipated neutropenic period  Outcome: Progressing     Problem: Discharge Planning  Goal: Discharge to home or other facility with appropriate resources  Outcome: Progressing     Problem: Chronic Conditions and Co-morbidities  Goal: Patient's chronic conditions and co-morbidity symptoms are monitored and maintained or improved  Outcome: Progressing

## 2025-02-06 VITALS
OXYGEN SATURATION: 99 % | SYSTOLIC BLOOD PRESSURE: 131 MMHG | DIASTOLIC BLOOD PRESSURE: 74 MMHG | RESPIRATION RATE: 16 BRPM | HEART RATE: 80 BPM | TEMPERATURE: 97.7 F

## 2025-02-06 PROCEDURE — 6370000000 HC RX 637 (ALT 250 FOR IP): Performed by: ADVANCED PRACTICE MIDWIFE

## 2025-02-06 PROCEDURE — 99024 POSTOP FOLLOW-UP VISIT: CPT | Performed by: ADVANCED PRACTICE MIDWIFE

## 2025-02-06 RX ADMIN — IBUPROFEN 800 MG: 800 TABLET, FILM COATED ORAL at 00:18

## 2025-02-06 RX ADMIN — DOCUSATE SODIUM 100 MG: 100 CAPSULE, LIQUID FILLED ORAL at 08:51

## 2025-02-06 RX ADMIN — IBUPROFEN 800 MG: 800 TABLET, FILM COATED ORAL at 08:38

## 2025-02-06 ASSESSMENT — PAIN DESCRIPTION - LOCATION: LOCATION: ABDOMEN

## 2025-02-06 ASSESSMENT — PAIN DESCRIPTION - ORIENTATION: ORIENTATION: LOWER;MID

## 2025-02-06 ASSESSMENT — PAIN SCALES - GENERAL: PAINLEVEL_OUTOF10: 3

## 2025-02-06 ASSESSMENT — PAIN DESCRIPTION - DESCRIPTORS: DESCRIPTORS: CRAMPING

## 2025-02-06 ASSESSMENT — PAIN - FUNCTIONAL ASSESSMENT: PAIN_FUNCTIONAL_ASSESSMENT: ACTIVITIES ARE NOT PREVENTED

## 2025-02-06 NOTE — ANESTHESIA POSTPROCEDURE EVALUATION
Department of Anesthesiology  Postprocedure Note    Patient: Anay Mendez  MRN: [id number]  YOB: 1995  Date of evaluation: 2/6/2025    Procedure Summary       Date: 02/05/25 Room / Location:     Anesthesia Start: 1109 Anesthesia Stop: 1338    Procedure: Labor Analgesia Diagnosis:     Scheduled Providers:  Responsible Provider: Charlie Ibarra APRN - CRNA    Anesthesia Type: epidural ASA Status: 2            Anesthesia Type: No value filed.    Britta Phase I: Britta Score: 10    Britta Phase II:      Anesthesia Post Evaluation    Patient location during evaluation: bedside  Patient participation: complete - patient participated  Level of consciousness: awake and alert  Pain score: 0  Airway patency: patent  Nausea & Vomiting: no nausea and no vomiting  Cardiovascular status: blood pressure returned to baseline and hemodynamically stable  Respiratory status: acceptable  Hydration status: euvolemic  Comments: Patient states she does not have any concerns and she is feeling well. Denies residual numbness and tingling in legs. Denies abnormal back pain. Denies headache. Denies issues using the bathroom.  Multimodal analgesia pain management approach  Pain management: adequate    No notable events documented.

## 2025-02-06 NOTE — DISCHARGE INSTRUCTIONS
Follow-up with your OB doctor as specified.    [facility] OB Department phone: [phone]    Dr. Roro Garcia DO  Mara Cramer [facility]  Velvet Traore [facility]  Leeann Moe [facility]  [address]  [place] [address]   [place] [phone]   or [place] [phone]       DIET  Eat a well balanced diet focusing on foods high in fiber and protein.   Drink plenty of fluids especially water.  To avoid constipation you may take a mild stool softener as recommended by your doctor or midwife.    ACTIVITY  Gradually increase your activity.  Resume exercise regimen only after advice by your doctor or midwife.  Avoid lifting anything heavier than a gallon of milk for SIX weeks.   Avoid driving until your doctor or midwife has given their approval.  Rise slowly from a lying to sitting and then a standing position.  Climb stairs one at a time.  Use caution when carrying your baby up and down the stairs.  NO SEXUAL Activity for 6 weeks or until advised by your doctor; Nothing in vagina: intercourse, tampons, or douching. No swimming or tub baths x 6 weeks.  Be prepared to discuss family planning at your follow-up OB visit.   You may feel tired or have a lack of energy.  You may continue your prenatal vitamin to replenish nutrients post delivery.  Nap when baby naps to catch up on sleep.     EMOTIONS  \"Baby blues\" are common for the first 1 to 2 weeks after birth. You may cry or feel sad or irritable for no reason.  Rest whenever you can. Being tired makes it harder to handle your emotions.  Talk to your partner, friends, and family about your feelings.  If your symptoms last for more than a few weeks, or if you feel very depressed or have thoughts of harming yourself or your infant, contact your OB provider for help. An example of what to say could be \"I have some symptoms of post partum depression, can I please arrange an appointment\"  Postpartum depression can be treated. Support groups and counseling can

## 2025-02-06 NOTE — PLAN OF CARE
Problem: Safety - Adult  Goal: Free from fall injury  202550 by Deja Garza RN  Outcome: Completed     Problem: Postpartum  Goal: Experiences normal postpartum course  Description:  Postpartum OB-Pregnancy care plan goal which identifies if the mother is experiencing a normal postpartum course  202550 by Deja Garza RN  Outcome: Completed     Problem: Postpartum  Goal: Appropriate maternal -  bonding  Description:  Postpartum OB-Pregnancy care plan goal which identifies if the mother and  are bonding appropriately  202550 by Deja Garza RN  Outcome: Completed     Problem: Postpartum  Goal: Establishment of infant feeding pattern  Description:  Postpartum OB-Pregnancy care plan goal which identifies if the mother is establishing a feeding pattern with their   202550 by Deja Garza RN  Outcome: Completed     Problem: Postpartum  Goal: Incisions, wounds, or drain sites healing without S/S of infection  202550 by Deja Garza RN  Outcome: Completed     Problem: Pain  Goal: Verbalizes/displays adequate comfort level or baseline comfort level  2025 by Deja Garza RN  Outcome: Completed     Problem: Infection - Adult  Goal: Absence of infection at discharge  202550 by Deja Garza RN  Outcome: Completed     Problem: Infection - Adult  Goal: Absence of infection during hospitalization  202550 by Deja Garza RN  Outcome: Completed     Problem: Infection - Adult  Goal: Absence of fever/infection during anticipated neutropenic period  202550 by Deja Garza RN  Outcome: Completed     Problem: Discharge Planning  Goal: Discharge to home or other facility with appropriate resources  2025 by Deja Garza RN  Outcome: Completed     Problem: Chronic Conditions and Co-morbidities  Goal: Patient's chronic conditions and co-morbidity symptoms are monitored and maintained or

## 2025-02-06 NOTE — PLAN OF CARE
Problem: Safety - Adult  Goal: Free from fall injury  Outcome: Progressing     Problem: Postpartum  Goal: Experiences normal postpartum course  Description:  Postpartum OB-Pregnancy care plan goal which identifies if the mother is experiencing a normal postpartum course  Outcome: Progressing  Goal: Appropriate maternal -  bonding  Description:  Postpartum OB-Pregnancy care plan goal which identifies if the mother and  are bonding appropriately  Outcome: Progressing  Goal: Establishment of infant feeding pattern  Description:  Postpartum OB-Pregnancy care plan goal which identifies if the mother is establishing a feeding pattern with their   Outcome: Progressing  Goal: Incisions, wounds, or drain sites healing without S/S of infection  Outcome: Progressing     Problem: Pain  Goal: Verbalizes/displays adequate comfort level or baseline comfort level  Outcome: Progressing     Problem: Infection - Adult  Goal: Absence of infection at discharge  Outcome: Progressing  Goal: Absence of infection during hospitalization  Outcome: Progressing  Goal: Absence of fever/infection during anticipated neutropenic period  Outcome: Progressing     Problem: Discharge Planning  Goal: Discharge to home or other facility with appropriate resources  Outcome: Progressing     Problem: Chronic Conditions and Co-morbidities  Goal: Patient's chronic conditions and co-morbidity symptoms are monitored and maintained or improved  Outcome: Progressing

## 2025-02-06 NOTE — LACTATION NOTE
This note was copied from a baby's chart.  Lactation note:    [x] Feeding observed, see lactation flowsheet.    [] Patient states breastfeeding is going well:  no complaints.  Denies questions or concerns.    [x] Patient has questions/concerns.  Pt is concerned that infant has a lip tie. She has experience with oral ties with previous children. Requests oral eval of infant.      Infant Exam:  General: Well cared for appearance    Behavior: Alert   [] Active [x] Quiet  Colon: Soft, Flat    Mucous Membranes: Moist    Skin: Clear    ENT: WNL    Mouth:  Upper lip: wide, prominent frenum, Kotlow class 4   Buccal: palpable, tighter on left  Tongue lateralization: [] Adequate [x] Limited  Tongue protrusion: [] Adequate [x] Limited  Tongue position in mouth: low  Lingual frenum: posterior, thin, blanches and pops when tongue elevated. Kotlow class 3  Suck assessment: munch  Neck: [x] WNL  [] Head/neck preference    Eyes: Clear    Respiratory: [x]  WNL     GI: [x] WNL    Musculoskeletal/Neuro: [x] WNL      Discussed oral exam and recommendations for prep for oral tie release. Parents would like to have a plan to prepare infant if they decide to release. They are returning tomorrow for circumcision and would like to meet with  then - will meet and establish prep for release.    [x] Verbalizes understanding, advised to follow up with lactation consultant if necessary.

## 2025-02-06 NOTE — PROGRESS NOTES
Department of Obstetrics and Gynecology  Labor and Delivery       Post Partum Progress Note             SUBJECTIVE:  PT doing well today and is requesting to go home at 24 hours.     OBJECTIVE:      Vitals:  /61   Pulse 82   Temp 97.9 °F (36.6 °C) (Oral)   Resp 16   LMP 05/08/2024   SpO2 98%   Breastfeeding Unknown   Patient Vitals for the past 24 hrs:   BP Temp Temp src Pulse Resp SpO2   02/06/25 0016 125/61 97.9 °F (36.6 °C) Oral 82 16 98 %   02/05/25 1948 129/71 97.9 °F (36.6 °C) Oral 94 16 --   02/05/25 1548 131/86 -- -- 88 16 --   02/05/25 1533 123/72 -- -- 80 16 --   02/05/25 1518 123/79 -- -- 75 16 --   02/05/25 1503 122/69 -- -- 83 18 --   02/05/25 1448 125/70 -- -- 71 16 --   02/05/25 1433 125/79 -- -- 81 16 --   02/05/25 1418 128/65 -- -- 77 16 --   02/05/25 1403 125/65 -- -- 79 16 --   02/05/25 1348 131/60 97.6 °F (36.4 °C) Oral 92 16 100 %   02/05/25 1331 -- -- -- -- -- 100 %   02/05/25 1321 (!) 100/58 -- -- (!) 102 16 100 %   02/05/25 1251 126/74 -- -- 78 16 100 %   02/05/25 1241 -- -- -- -- -- 100 %   02/05/25 1238 127/71 -- -- 96 16 100 %   02/05/25 1228 -- -- -- -- -- 100 %   02/05/25 1221 -- -- -- -- -- 100 %   02/05/25 1220 130/72 -- -- 88 16 98 %   02/05/25 1216 -- -- -- -- -- 100 %   02/05/25 1206 129/71 97.6 °F (36.4 °C) Oral 77 16 100 %   02/05/25 1151 -- -- -- -- -- 100 %   02/05/25 1150 130/78 -- -- 87 16 100 %   02/05/25 1146 -- -- -- -- -- 100 %   02/05/25 1145 106/73 -- -- 93 16 100 %   02/05/25 1138 123/60 -- -- 91 16 100 %   02/05/25 1133 134/67 -- -- 91 16 100 %   02/05/25 1128 (!) 140/75 -- -- 91 16 100 %   02/05/25 1126 139/77 -- -- 99 16 100 %   02/05/25 1124 136/77 -- -- 95 18 91 %   02/05/25 1122 138/76 -- -- 95 16 98 %   02/05/25 1121 -- -- -- -- -- 100 %   02/05/25 1120 139/79 97.7 °F (36.5 °C) Oral 96 16 100 %   02/05/25 1045 (!) 142/80 97.6 °F (36.4 °C) Oral 99 16 --   02/05/25 0939 136/88 97.8 °F (36.6 °C) Oral 95 16 98 %   02/05/25 0841 139/85 97.8 °F (36.6 °C) 
Discharge instructions reviewed with the patient. All questions and concerns were addressed. Unit number provided to the patient should they come up with any questions once they are home. Pt verbalizes understanding of instructions.    
During bedside shift change Pitocin infusion noted to be at 3milli/units. Previous RN states it is to be increased at 0705 but the increase to 3milli/units is not charted. Due to not knowing when last increase actually was completed, RN to wait until 0730 to increase pitocin infusion.   
JORGE Alicea CNM at bedside for AROM.   
JORGE Pool CNM in room at this time for delivery. FSE removed per CNM at 1334.   
JORGE Traore CNM at bedside at this time. Updated on recent SVE, contraction pattern and pitocin infusion rate. CNM placed per RN with CNM at bedside. Pt tolerated well.     
JORGE Traore CNM notified at this time on recent SVE and patient feeling pushy. KARIN on her way to department for delivery.   
OSMAN Ibarra CRNA in room at this time for epidural placement. Pt alert and oriented x3,  at bedside, supportive.     1113-consent signed at this time by patient, CRNA and writer as witness    1114-timeout performed per pt, all agree    1116-epidural placement begins at this time    1120-test dose administered at this time per CRNA, pt reports no side effects.    1122-epidural placement complete at this time. Pt tolerated well with no side effects reported. Pt laid back in right side tilt at this time,   
Patient actively pushing.  RN remains in continuous attendance at the bedside.  Assessment & evaluation of fetal heart rate ongoing via continuous EFM.   
Patient placed in left side lying for 4 contractions. Pt laid back at 1325 and SVE performed per pt request and c/o pressure. Pt now 10/100/+1. RN to notify CNM.   
Per KARIN RN okay to remove toco due to patient knowing when her contractions are occurring.   
Pt  admited for IOL.  A&O, denies pain oriented to room, call light, TV, and phone. Efm and toco applied for monitoring. Orders received from provider, labs sent and consents signed placed in chart. Patient able to use call light to express needs. Bed to lowest position with door open and call light in reach. All fall precautions implemented at this time. Will continue to monitor  
Pt ambulatory off unit at this time with SO and infant for discharge home.   
Pt sitting up on birthing ball.   
Pt sitting up on the edge of the bed for epidural placentin. RN hand holding EFM throughout placement to trace FHR. FRH intermittently heard in the 130's-140's. Pt reports contractions every 2-3 mins throughout epidural placement. FHR begins tracing again at 1124.   
RN at bedside at this time readjusting toco to trace contractions. Pt reports contractions roughly every 3-5 mins. Contractions tracing inverted starting at 0814.   
RN remained at bedside throughout pushing.  EFM continuously assessed.  Vaginal delivery of viable infant.  
Writer called JORGE Traore CNM at this time and updated on recent SVE , contraction pattern, and patient requesting the epidural. Pt okay to receive epidural at this time.   
Writer called OSMAN Ibarra CRNA and notified of patients request for an epidural. CRNA states he will be over shortly to place.   
Writer spoke with Dr. Najjar, pediatrician on call, and updated on maternal history, fathers history of marfans syndrome, pregnancy course and expecting a male. RN okay to place normal  orders once infant is born.   
[id number]

## 2025-02-06 NOTE — DISCHARGE SUMMARY
Obstetrical Discharge Form        Gestational Age:39w0d    Antepartum complications: none    Date of Delivery: 25    Type of Delivery:        Delivered By:  JORGE HARRIS CNM    Assisted By:N/A      Baby: male    Anesthesia:  epidural      Intrapartum complications: None    Feeding method: breast    Blood type: A POSITIVE      Rubella:    Rubella Antibody, IgG   Date Value Ref Range Status   2024 150.0 IU/mL Final     Comment:           <10 NON REACTIVE Negative for Anti-Rubella IgG  >=10 REACTIVE Positive for Anti Rubella IgG  The presence of IgG antibody to Rubella virus is an indication of previous exposure either   by prior infection or vaccination.       T. Pallidium, IGG:    T. pallidum, IgG   Date Value Ref Range Status   2024 NONREACTIVE NONREACTIVE Final     Comment:           T. pallidum antibodies are not detected.  There is no serological evidence of infection with T. pallidum (early primary syphilis   cannot be excluded).  Retest in 2-4 weeks if syphilis is clinically suspect.             Hepatitis B Surface Antigen:   Hepatitis B Surface Ag   Date Value Ref Range Status   2024 NONREACTIVE NONREACTIVE Final     HIV:    HIV Ag/Ab   Date Value Ref Range Status   2024 NONREACTIVE NONREACTIVE Final     Comment:     No laboratory evidence of HIV infection.  If acute HIV infection is suspected, consider   testing for HIV-1 RNA.         Results for orders placed or performed during the hospital encounter of 25   CBC auto differential   Result Value Ref Range    WBC 9.2 3.5 - 11.3 k/uL    RBC 4.15 3.95 - 5.11 m/uL    Hemoglobin 11.4 (L) 11.9 - 15.1 g/dL    Hematocrit 34.9 (L) 36.3 - 47.1 %    MCV 84.1 82.6 - 102.9 fL    MCH 27.5 25.2 - 33.5 pg    MCHC 32.7 28.4 - 34.8 g/dL    RDW 13.0 11.8 - 14.4 %    Platelets 223 138 - 453 k/uL    MPV 12.2 8.1 - 13.5 fL    NRBC Automated 0.0 0.0 per 100 WBC    Neutrophils % 71 (H) 36 - 65 %    Lymphocytes % 20 (L) 24 - 43 %    Monocytes %

## 2025-02-07 ENCOUNTER — LACTATION ENCOUNTER (OUTPATIENT)
Dept: LABOR AND DELIVERY | Age: 30
End: 2025-02-07

## 2025-02-07 NOTE — LACTATION NOTE
This note was copied from a baby's chart.  Discussed plan of care for oral ties with mother. She agrees that she would like to proceed with oral exercises to try to optimize tongue function.  Recommended oral exercises 6-8 times daily:  Lip brushing  TMJ massage and walk the chin  Floor of mouth release and tongue scoop.    Demonstrated head hang and rocking/rhythmic movements.    We discussed pursuing bodywork - mom had done this with a previous child. Gave contact info.    Mom would also like to contact various dentists for more information about their process. Contact information given for several providers.    Appointment made for a 2 week follow up. Parents will work on oral exercises and we will reevaluate in two weeks. Mom verbalizes understanding.

## 2025-02-10 ENCOUNTER — TELEPHONE (OUTPATIENT)
Dept: PRIMARY CARE CLINIC | Age: 30
End: 2025-02-10

## 2025-02-10 NOTE — TELEPHONE ENCOUNTER
Pt's insurance has denied Santyl, are we switching to something else?     Pt is scheduled to follow up on 2/11/25 but states that  appointment was intended as a follow up after using the ointment for 2 weeks. Should she still follow up tomorrow?

## 2025-02-11 ENCOUNTER — OFFICE VISIT (OUTPATIENT)
Dept: PRIMARY CARE CLINIC | Age: 30
End: 2025-02-11
Payer: COMMERCIAL

## 2025-02-11 VITALS
DIASTOLIC BLOOD PRESSURE: 78 MMHG | SYSTOLIC BLOOD PRESSURE: 122 MMHG | HEART RATE: 95 BPM | WEIGHT: 235.6 LBS | BODY MASS INDEX: 36.9 KG/M2 | OXYGEN SATURATION: 99 %

## 2025-02-11 DIAGNOSIS — T14.8XXA SPLINTER IN SKIN: Primary | ICD-10-CM

## 2025-02-11 DIAGNOSIS — S81.812A LACERATION OF LEFT LOWER EXTREMITY, INITIAL ENCOUNTER: ICD-10-CM

## 2025-02-11 PROCEDURE — 99213 OFFICE O/P EST LOW 20 MIN: CPT | Performed by: NURSE PRACTITIONER

## 2025-02-11 ASSESSMENT — ENCOUNTER SYMPTOMS
COLOR CHANGE: 0
NAUSEA: 0
DIARRHEA: 0
COUGH: 0
SHORTNESS OF BREATH: 0
VOMITING: 0
WHEEZING: 0

## 2025-02-11 ASSESSMENT — PATIENT HEALTH QUESTIONNAIRE - PHQ9
SUM OF ALL RESPONSES TO PHQ QUESTIONS 1-9: 0
2. FEELING DOWN, DEPRESSED OR HOPELESS: NOT AT ALL
SUM OF ALL RESPONSES TO PHQ QUESTIONS 1-9: 0
SUM OF ALL RESPONSES TO PHQ9 QUESTIONS 1 & 2: 0
SUM OF ALL RESPONSES TO PHQ QUESTIONS 1-9: 0
SUM OF ALL RESPONSES TO PHQ QUESTIONS 1-9: 0
1. LITTLE INTEREST OR PLEASURE IN DOING THINGS: NOT AT ALL

## 2025-02-11 NOTE — PROGRESS NOTES
2025     Anay Mendez (:  1995) is a 29 y.o. female, here for evaluation of the following medical concerns:  Chief Complaint:   Chief Complaint   Patient presents with    Wound Check       Left ring finger splinter  - healed  - ROM intact  - No pain    Left lower leg wound  - Healing  - Pinpoint opening  - Dry scaling to outer cover  - Still draining purulent dranage  - No erythema, warmth, edema, fluctuance, crepitus, or induration.    Wound Check        Prior to Visit Medications    Not on File        Social History     Tobacco Use    Smoking status: Never    Smokeless tobacco: Never   Substance Use Topics    Alcohol use: Not Currently        Vitals:    25 1604   BP: 122/78   Pulse: 95   SpO2: 99%   Weight: 106.9 kg (235 lb 9.6 oz)     Estimated body mass index is 36.9 kg/m² as calculated from the following:    Height as of 25: 1.702 m (5' 7\").    Weight as of this encounter: 106.9 kg (235 lb 9.6 oz).    Review of Systems   Constitutional:  Negative for chills, diaphoresis, fatigue and fever.   Respiratory:  Negative for cough, shortness of breath and wheezing.    Cardiovascular:  Negative for chest pain and palpitations.   Gastrointestinal:  Negative for diarrhea, nausea and vomiting.   Musculoskeletal:  Negative for myalgias.   Skin:  Positive for wound. Negative for color change.   Neurological:  Negative for dizziness, light-headedness and headaches.     Physical Exam  Vitals and nursing note reviewed.   Constitutional:       General: She is not in acute distress.     Appearance: She is not ill-appearing, toxic-appearing or diaphoretic.   Cardiovascular:      Rate and Rhythm: Normal rate and regular rhythm.      Heart sounds: Normal heart sounds.   Pulmonary:      Effort: Pulmonary effort is normal. No respiratory distress.      Breath sounds: Normal breath sounds. No wheezing or rhonchi.   Skin:     General: Skin is warm and dry.      Findings: Wound present. No abscess,

## 2025-02-15 ENCOUNTER — PATIENT MESSAGE (OUTPATIENT)
Dept: OBGYN | Age: 30
End: 2025-02-15

## 2025-02-17 RX ORDER — DICLOXACILLIN SODIUM 500 MG/1
500 CAPSULE ORAL 4 TIMES DAILY
Qty: 40 CAPSULE | Refills: 0 | Status: SHIPPED | OUTPATIENT
Start: 2025-02-17 | End: 2025-02-27

## 2025-02-17 RX ORDER — IBUPROFEN 600 MG/1
600 TABLET, FILM COATED ORAL EVERY 6 HOURS PRN
Qty: 30 TABLET | Refills: 0 | Status: SHIPPED | OUTPATIENT
Start: 2025-02-17

## 2025-02-28 ENCOUNTER — LACTATION ENCOUNTER (OUTPATIENT)
Dept: LABOR AND DELIVERY | Age: 30
End: 2025-02-28

## 2025-02-28 NOTE — LACTATION NOTE
This note was copied from a baby's chart.  Date of office visit 2025    Infant's Name  Deloris Brice   2025    Mother's Name Extended Emergency Contact Information  Primary Emergency Contact: TONYA BRICE  Address: [address] [place] [address] [address]           [place] [address] [place]  Home Phone: [phone]  Mobile Phone: [phone]  Relation: Mother  Secondary Emergency Contact: Giuliano Brice  Home Phone: [phone]  Relation: Father phone [phone]    Mother's Provider K Pool Infant Provider B Velasquez    : 3  Para: 3  Gest Age @ Delivery: Gestational Age: 39w0d  Type of Delivery: vaginal    Pregnancy/Delivery Complications: neg    Significant Maternal Health History: neg    Maternal Medications: Pre/probiotic, Vit C, Zinc, PNV daily    Infant Birth Wt: Birth Weight: 3.141 kg (6 lb 14.8 oz)        Present Age: 3 wk.o.     Reason for Visit: one week post release check    Breast Assessment:  Breast Appearance/size:  [] S/IGT [x] Average    [] Lg    [x] Soft  [] Full  [] Engorged  Past surgery/scars nipple piercing bilat- no scar tissue  Supply: [] Low   [x] Normal  [x] Oversupply  Nipples:  [] Flat   [] Inverted   [] Invert w/ compression   [x] Protrude  Trauma: [x] None [] Bruise [] Wound    Pain: right breast has initial pinch, no pain on left  Pumping: pumped last weekend for , approx every 3 hours that day, expressed 3-4 ounces each time.    Also uses [employer]  - catches approx 10 ounces per day.    Infant Exam:  General: Well cared for appearance   Behavior: Alert   [] Active [x] Quiet  [place]: Soft, Flat    Mucous Membranes: Moist    Skin: Clear    ENT: WNL    Mouth:  Upper lip: released, healing   Buccal: released, healing  Tongue lateralization: [] Adequate [x] Limited  Tongue protrusion: [] Adequate [x] Limited  Tongue position in mouth: low  Lingual frenum: released, healing  Suck assessment: munch  Neck: [] WNL  [x] Head/neck preference

## 2025-03-12 ENCOUNTER — LACTATION ENCOUNTER (OUTPATIENT)
Dept: LABOR AND DELIVERY | Age: 30
End: 2025-03-12

## 2025-03-12 NOTE — LACTATION NOTE
This note was copied from a baby's chart.  Date of office visit 3/12/2025    Infant's Name  Deloris Brice   2025    Mother's Name Extended Emergency Contact Information  Primary Emergency Contact: TONYA BRICE  Address: [address] [place] [address] [address]           [place] [address] [place]  Home Phone: [phone]  Mobile Phone: [phone]  Relation: Mother  Secondary Emergency Contact: Giuliano Brice  Home Phone: [phone]  Relation: Father phone [phone]    Mother's Provider SALUD Traore        Infant Provider RONI Eng     : 3  Para: 3  Gest Age @ Delivery: Gestational Age: 39w0d  Type of Delivery: vaginal     Pregnancy/Delivery Complications: neg     Significant Maternal Health History: neg     Maternal Medications: Pre/probiotic, Vit C, Zinc, PNV daily    Maternal Medications: Pre/probiotic, Vit C, Zinc, PNV daily     Infant Birth Wt: Birth Weight: 3.141 kg (6 lb 14.8 oz)        Present Age: 5 wk.o.     Reason for Visit: follow up    Breast Assessment:  Breast Appearance/size:  [] S/IGT [x] Average    [] Lg    [x] Soft  [] Full  [] Engorged  Past surgery/scars nipple piercing bilat - no scar tissue  Supply: [] Low   [x] Normal  [x] Oversupply  Nipples:  [] Flat   [] Inverted   [] Invert w/ compression   [x] Protrude  Trauma: [x] None [] Bruise [] Wound    Pain: Initially a pinch on right - rotates positions when infant feeds on right.   Pumping: collects milk with feeds with [employer]  - collects approx 10 ounces daily. Puts 5 ounces in freezer    Infant Exam:  General: Well cared for appearance    Behavior: Alert   [] Active [x] Quiet  [place]: Soft, Flat    Mucous Membranes: Moist    Skin: Clear    ENT: WNL    Mouth:  Upper lip: released, healed - slightly firm, some scar tissue noted   Buccal: soft  Tongue lateralization: [] Adequate [x] Limited- improving  Tongue protrusion: [] Adequate [x] Limited-improving  Tongue position in mouth: mid  Lingual frenum:  Detail Level: Zone Comment: Patient reports lesion is stable and hasn't changed size. Reassurance provided.

## 2025-03-19 ENCOUNTER — POSTPARTUM VISIT (OUTPATIENT)
Dept: OBGYN | Age: 30
End: 2025-03-19
Payer: COMMERCIAL

## 2025-03-19 VITALS
SYSTOLIC BLOOD PRESSURE: 118 MMHG | WEIGHT: 232 LBS | HEIGHT: 67 IN | DIASTOLIC BLOOD PRESSURE: 78 MMHG | BODY MASS INDEX: 36.41 KG/M2

## 2025-03-19 LAB — HGB, POC: 13.5 G/DL

## 2025-03-19 PROCEDURE — 0503F POSTPARTUM CARE VISIT: CPT | Performed by: ADVANCED PRACTICE MIDWIFE

## 2025-03-19 PROCEDURE — 85018 HEMOGLOBIN: CPT | Performed by: ADVANCED PRACTICE MIDWIFE

## 2025-03-19 NOTE — PROGRESS NOTES
POSTPARTUM EXAM    Date of service: 3/19/2025    Anay Mendez  Is a 29 y.o.  female    PT's PCP is: Meli Renner MD     : 1995     OB History    Para Term  AB Living   3 3 3 0 0 3   SAB IAB Ectopic Molar Multiple Live Births   0 0 0  0 3      # Outcome Date GA Lbr Aidan/2nd Weight Sex Type Anes PTL Lv   3 Term 25 39w0d / 00:13 3.141 kg (6 lb 14.8 oz) M Vag-Spont EPI N WALT   2 Term 23 40w2d  3.447 kg (7 lb 9.6 oz) F Vag-Spont EPI N WALT      Complications: Nuchal cord, single gestation   1 Term 21 39w0d 11:52 / 00:24 3.737 kg (8 lb 3.8 oz) M Vag-Spont EPI N WALT        Social History     Tobacco Use   Smoking Status Never   Smokeless Tobacco Never         Social History     Substance and Sexual Activity   Alcohol Use Not Currently         Delivery date 25    Type of delivery:  Spontaneous vaginal delivery    Laceration:No,     Infant gender: male    Infant name: Deloris     Are you breast or bottle feeding?  Breast    Have you been sexually active since delivery? No    Vital Signs: Blood pressure 118/78, height 1.702 m (5' 7\"), weight 105.2 kg (232 lb), last menstrual period 2024, currently breastfeeding.     Labs:    Blood Type and Rh: A POSITIVE          Allergies: Patient has no known allergies.      Current Outpatient Medications:     ibuprofen (ADVIL;MOTRIN) 600 MG tablet, Take 1 tablet by mouth every 6 hours as needed for Pain (Patient not taking: Reported on 3/19/2025), Disp: 30 tablet, Rfl: 0    Last Yearly date:      Last pap date and results: 24    Last HPV date and results: never     Chief Complaint   Patient presents with    Postpartum Care     Pp visit following vaginal delivery on 25. Pt states no issues or concerns. Pt is currently breastfeeding, pt has not been sexually active since delivery, pt would like to discuss cycle control options.         How many Hours of sleep do you get a night: about 5 hours     Do you

## 2025-04-23 ENCOUNTER — LACTATION ENCOUNTER (OUTPATIENT)
Dept: LABOR AND DELIVERY | Age: 30
End: 2025-04-23

## 2025-04-23 NOTE — LACTATION NOTE
feedings during the stomach virus, and other household members had it as well.  Discussed optimal intake volume - 25-30 ounces daily. Encouraged mom to continue feeding caught milk, but to try to increase the number of feedings in a 24 hours time frame.  After infant , he appeared less tense. LC works with infant on range of motion and gentle stretches and rhythmic movement as tolerated. Infant continues to resist turning head to right. Tolerates tummy time for a short while and tolerates rhythmic movements. Mom observes and states that she will work on this at home. For now, there is no plan to return to chiropractor.  Regarding chin quiver, advised mom to try to work on suck strength by continuing floor of mouth release, tongue elevation, tongue tug of war, and fishy face cheek support. Mom verbalizes understanding.    Follow Up:  as necessary after weight check next week.

## 2025-07-14 ENCOUNTER — OFFICE VISIT (OUTPATIENT)
Dept: PRIMARY CARE CLINIC | Age: 30
End: 2025-07-14
Payer: COMMERCIAL

## 2025-07-14 ENCOUNTER — HOSPITAL ENCOUNTER (OUTPATIENT)
Dept: GENERAL RADIOLOGY | Age: 30
Discharge: HOME | End: 2025-07-16
Payer: COMMERCIAL

## 2025-07-14 VITALS
HEART RATE: 95 BPM | DIASTOLIC BLOOD PRESSURE: 76 MMHG | BODY MASS INDEX: 36.4 KG/M2 | SYSTOLIC BLOOD PRESSURE: 106 MMHG | OXYGEN SATURATION: 98 % | WEIGHT: 232.4 LBS

## 2025-07-14 DIAGNOSIS — E66.09 CLASS 2 OBESITY DUE TO EXCESS CALORIES WITHOUT SERIOUS COMORBIDITY WITH BODY MASS INDEX (BMI) OF 36.0 TO 36.9 IN ADULT: ICD-10-CM

## 2025-07-14 DIAGNOSIS — M54.40 BACK PAIN OF LUMBAR REGION WITH SCIATICA: ICD-10-CM

## 2025-07-14 DIAGNOSIS — M54.40 BACK PAIN OF LUMBAR REGION WITH SCIATICA: Primary | ICD-10-CM

## 2025-07-14 DIAGNOSIS — E66.812 CLASS 2 OBESITY DUE TO EXCESS CALORIES WITHOUT SERIOUS COMORBIDITY WITH BODY MASS INDEX (BMI) OF 36.0 TO 36.9 IN ADULT: ICD-10-CM

## 2025-07-14 PROCEDURE — 72100 X-RAY EXAM L-S SPINE 2/3 VWS: CPT

## 2025-07-14 PROCEDURE — G2211 COMPLEX E/M VISIT ADD ON: HCPCS | Performed by: NURSE PRACTITIONER

## 2025-07-14 PROCEDURE — 99214 OFFICE O/P EST MOD 30 MIN: CPT | Performed by: NURSE PRACTITIONER

## 2025-07-14 RX ORDER — METHYLPREDNISOLONE 4 MG/1
TABLET ORAL
Qty: 1 KIT | Refills: 0 | Status: SHIPPED | OUTPATIENT
Start: 2025-07-14 | End: 2025-07-20

## 2025-07-14 ASSESSMENT — ENCOUNTER SYMPTOMS
SHORTNESS OF BREATH: 0
BACK PAIN: 1
DIARRHEA: 0
COUGH: 0
CHEST TIGHTNESS: 0
VOMITING: 0
NAUSEA: 0
WHEEZING: 0

## 2025-07-14 ASSESSMENT — PATIENT HEALTH QUESTIONNAIRE - PHQ9
SUM OF ALL RESPONSES TO PHQ QUESTIONS 1-9: 0
2. FEELING DOWN, DEPRESSED OR HOPELESS: NOT AT ALL
1. LITTLE INTEREST OR PLEASURE IN DOING THINGS: NOT AT ALL

## 2025-07-14 NOTE — PROGRESS NOTES
[facility]    2025     Anay Mendez (:  1995) is a 29 y.o. female, here for evaluation of the following medical concerns:  Chief Complaint:   Chief Complaint   Patient presents with    Lower Back Pain     Patients states she's having low back pain radiating down bilateral leg. 2 months post partum it started and about a month ago it was a lot better and it wasn't nearly as painful and it would go away within the first hour. Before pain was very localized and now it's not. Yesterday it was a 7/10 today it's more of a 2/10.   TENS unit helped and she did take a muscle relaxer that she had from her foot and it didn't help. Tylenol and motrin not helping.     Low back pain  - Waxes and wanes  - Urinary incontinence is attributed to being 2 months post partum  - No bowel incontinence  - No numbness to legs  - Gait is altered due to pain  - Mild tenderness with palpation  - Pain is every morning upon waking  - Hurts less if she lays on left side all night  - TENS is the most helpful. No implantable devices or knowledge of cardiac arrhythmia  - Currently weaning off breast feeding    Obesity  - Seeking to lose 60 pounds.  - Interested in GLP1's (Mounjaro specifically)  - Going to start a high protein diet as she raises her own beef      Prior to Visit Medications    Medication Sig Taking? Authorizing Provider   methylPREDNISolone (MEDROL DOSEPACK) 4 MG tablet Take by mouth. Yes Flaco Matias, APRN - CNP      Social History     Tobacco Use    Smoking status: Never    Smokeless tobacco: Never   Substance Use Topics    Alcohol use: Not Currently      Vitals:    25 0824   BP: 106/76   Pulse: 95   SpO2: 98%   Weight: 105.4 kg (232 lb 6.4 oz)     Estimated body mass index is 36.4 kg/m² as calculated from the following:    Height as of 3/19/25: 1.702 m (5' 7\").    Weight as of this encounter: 105.4 kg (232 lb 6.4 oz).    Review of Systems   Constitutional:  Negative for chills,

## 2025-08-19 ENCOUNTER — OFFICE VISIT (OUTPATIENT)
Dept: OBGYN | Age: 30
End: 2025-08-19
Payer: COMMERCIAL

## 2025-08-19 VITALS
SYSTOLIC BLOOD PRESSURE: 122 MMHG | HEIGHT: 67 IN | DIASTOLIC BLOOD PRESSURE: 70 MMHG | WEIGHT: 236 LBS | BODY MASS INDEX: 37.04 KG/M2

## 2025-08-19 DIAGNOSIS — N92.1 MENORRHAGIA WITH IRREGULAR CYCLE: ICD-10-CM

## 2025-08-19 DIAGNOSIS — Z01.419 ENCOUNTER FOR GYNECOLOGICAL EXAMINATION WITHOUT ABNORMAL FINDING: Primary | ICD-10-CM

## 2025-08-19 PROCEDURE — 99395 PREV VISIT EST AGE 18-39: CPT | Performed by: ADVANCED PRACTICE MIDWIFE

## 2025-08-19 RX ORDER — NORGESTIMATE AND ETHINYL ESTRADIOL 0.25-0.035
1 KIT ORAL DAILY
Qty: 3 PACKET | Refills: 3 | Status: SHIPPED | OUTPATIENT
Start: 2025-08-19